# Patient Record
Sex: FEMALE | Race: WHITE | NOT HISPANIC OR LATINO | Employment: UNEMPLOYED | URBAN - NONMETROPOLITAN AREA
[De-identification: names, ages, dates, MRNs, and addresses within clinical notes are randomized per-mention and may not be internally consistent; named-entity substitution may affect disease eponyms.]

---

## 2017-07-06 ENCOUNTER — LAB (OUTPATIENT)
Dept: LAB | Facility: HOSPITAL | Age: 42
End: 2017-07-06

## 2017-07-06 ENCOUNTER — TRANSCRIBE ORDERS (OUTPATIENT)
Dept: LAB | Facility: HOSPITAL | Age: 42
End: 2017-07-06

## 2017-07-06 DIAGNOSIS — Z91.018 MULTIPLE FOOD ALLERGIES: Primary | ICD-10-CM

## 2017-07-06 DIAGNOSIS — Z91.018 MULTIPLE FOOD ALLERGIES: ICD-10-CM

## 2017-07-06 LAB — TSH SERPL DL<=0.05 MIU/L-ACNC: 0.62 MIU/ML (ref 0.46–4.68)

## 2017-07-06 PROCEDURE — 86003 ALLG SPEC IGE CRUDE XTRC EA: CPT

## 2017-07-06 PROCEDURE — 84443 ASSAY THYROID STIM HORMONE: CPT

## 2017-07-06 PROCEDURE — 36415 COLL VENOUS BLD VENIPUNCTURE: CPT

## 2017-07-11 LAB
BARLEY IGE QN: ABNORMAL KU/L
BEEF IGE QN: ABNORMAL KU/L
CABBAGE IGE QN: ABNORMAL KU/L
CARROT IGE QN: ABNORMAL KU/L
CHICKEN MEAT IGE QN: ABNORMAL KU/L
CODFISH IGE QN: <0.1 KU/L
CONV CLASS DESCRIPTION: ABNORMAL
CORN IGE QN: <0.1 KU/L
COW MILK IGE QN: 0.53 KU/L
CRAB IGE QN: ABNORMAL KU/L
EGG WHITE IGE QN: <0.1 KU/L
GRAPE IGE QN: ABNORMAL KU/L
GREEN PEPPER IGE QN: ABNORMAL KU/L
LETTUCE IGE QN: ABNORMAL KU/L
OAT IGE QN: ABNORMAL KU/L
ORANGE IGE QN: ABNORMAL KU/L
PEANUT IGE QN: <0.1 KU/L
PORK IGE QN: ABNORMAL KU/L
POTATO IGE QN: ABNORMAL KU/L
RICE IGE QN: ABNORMAL KU/L
RYE IGE: ABNORMAL KU/L
SHRIMP IGE: <0.1 KU/L
SOYBEAN IGE QN: <0.1 KU/L
TOMATO IGE QN: <0.1 KU/L
TUNA IGE QN: ABNORMAL KU/L
WHEAT IGE QN: <0.1 KU/L
WHITE BEAN IGE QN: ABNORMAL KU/L

## 2017-08-16 ENCOUNTER — HOSPITAL ENCOUNTER (EMERGENCY)
Facility: HOSPITAL | Age: 42
End: 2017-08-16

## 2017-12-26 ENCOUNTER — TRANSCRIBE ORDERS (OUTPATIENT)
Dept: ORTHOPEDIC SURGERY | Facility: CLINIC | Age: 42
End: 2017-12-26

## 2017-12-26 DIAGNOSIS — M25.562 BILATERAL CHRONIC KNEE PAIN: Primary | ICD-10-CM

## 2017-12-26 DIAGNOSIS — M25.561 BILATERAL CHRONIC KNEE PAIN: Primary | ICD-10-CM

## 2017-12-26 DIAGNOSIS — G89.29 BILATERAL CHRONIC KNEE PAIN: Primary | ICD-10-CM

## 2018-01-18 ENCOUNTER — OFFICE VISIT (OUTPATIENT)
Dept: ORTHOPEDIC SURGERY | Facility: CLINIC | Age: 43
End: 2018-01-18

## 2018-01-18 VITALS — WEIGHT: 253 LBS | HEIGHT: 66 IN | BODY MASS INDEX: 40.66 KG/M2

## 2018-01-18 DIAGNOSIS — M25.561 ACUTE PAIN OF BOTH KNEES: Primary | ICD-10-CM

## 2018-01-18 DIAGNOSIS — M25.562 ACUTE PAIN OF BOTH KNEES: Primary | ICD-10-CM

## 2018-01-18 DIAGNOSIS — M17.0 PRIMARY OSTEOARTHRITIS OF BOTH KNEES: ICD-10-CM

## 2018-01-18 DIAGNOSIS — M23.91 INTERNAL DERANGEMENT OF BOTH KNEES: ICD-10-CM

## 2018-01-18 DIAGNOSIS — E66.01 MORBID OBESITY WITH BMI OF 40.0-44.9, ADULT (HCC): ICD-10-CM

## 2018-01-18 DIAGNOSIS — M23.92 INTERNAL DERANGEMENT OF BOTH KNEES: ICD-10-CM

## 2018-01-18 PROCEDURE — 99203 OFFICE O/P NEW LOW 30 MIN: CPT | Performed by: ORTHOPAEDIC SURGERY

## 2018-01-18 RX ORDER — CETIRIZINE HYDROCHLORIDE 10 MG/1
TABLET ORAL
COMMUNITY
Start: 2017-12-29 | End: 2018-02-18

## 2018-01-18 RX ORDER — LOSARTAN POTASSIUM 50 MG/1
TABLET ORAL
COMMUNITY
Start: 2017-12-18 | End: 2020-02-04 | Stop reason: SDUPTHER

## 2018-01-18 RX ORDER — FAMOTIDINE 20 MG/1
TABLET, FILM COATED ORAL
COMMUNITY
Start: 2017-12-18 | End: 2018-02-18

## 2018-01-18 NOTE — PROGRESS NOTES
Michelle Lange is a 42 y.o. female   Primary provider:  Sandra Villalba MD       Chief Complaint   Patient presents with   • Left Knee - Consult   • Right Knee - Consult       HISTORY OF PRESENT ILLNESS:   Patient is here for consult- bilateral knee pain. Patient was referred to our office by Josy Rich. Patient denies injury. Patient states that [facility] obtained xray's and gave instruction to ice and elevate. Patient denies injury. Patient states that her bilateral knee has moderate to severe pain/discomfort, daily. Patient states that the pain is constant and is worse upon weightbearing. Patient states she has previous surgical HX of her RT knee. Patient has brought her xray disc for review.  She has had pain for 2-3 years.  Her pain is worse over the last 2-3 months.  No specific injury.  No improvement with Tylenol.  She cannot take anti-inflammatory medications because they call hives and an anaphylactic reaction.  She has occasional locking.  Pain with pivoting and twisting.  She has severe pain with squatting as well.      History of Present Illness     CONCURRENT MEDICAL HISTORY:    Past Medical History:   Diagnosis Date   • GERD (gastroesophageal reflux disease)        Allergies   Allergen Reactions   • Lactose Intolerance (Gi) Rash         Current Outpatient Prescriptions:   •  amoxicillin-clavulanate (AUGMENTIN) 875-125 MG per tablet, Take 1 tablet by mouth 2 (Two) Times a Day., Disp: 20 tablet, Rfl: 0  •  cetirizine (zyrTEC) 10 MG tablet, , Disp: , Rfl:   •  famotidine (PEPCID) 20 MG tablet, , Disp: , Rfl:   •  gabapentin (NEURONTIN) 300 MG capsule, Take 600 mg by mouth 3 (Three) Times a Day., Disp: , Rfl:   •  losartan (COZAAR) 50 MG tablet, , Disp: , Rfl:   •  tiZANidine (ZANAFLEX) 4 MG tablet, Take 4 mg by mouth Every 8 (Eight) Hours As Needed for Muscle Spasms., Disp: , Rfl:     History reviewed. No pertinent surgical history.    History reviewed. No pertinent family  "history.    Social History     Social History   • Marital status:      Spouse name: N/A   • Number of children: N/A   • Years of education: N/A     Occupational History   • Not on file.     Social History Main Topics   • Smoking status: Never Smoker   • Smokeless tobacco: Never Used   • Alcohol use No   • Drug use: No   • Sexual activity: Defer     Other Topics Concern   • Not on file     Social History Narrative        Review of Systems   Constitutional: Positive for activity change.   HENT: Negative.    Eyes: Negative.    Respiratory: Negative.    Cardiovascular: Negative.    Gastrointestinal: Negative.    Endocrine: Negative.    Genitourinary: Negative.    Musculoskeletal: Positive for joint swelling.   Skin: Negative.    Allergic/Immunologic: Negative.    Neurological: Negative.    Hematological: Negative.    Psychiatric/Behavioral: Negative.        PHYSICAL EXAMINATION:       Ht 167.6 cm (66\")  Wt 115 kg (253 lb)  BMI 40.84 kg/m2    Physical Exam   Constitutional: She is oriented to person, place, and time. She appears well-developed and well-nourished.   Musculoskeletal:        Right knee: She exhibits no effusion.        Left knee: She exhibits no effusion.   Neurological: She is alert and oriented to person, place, and time.   Psychiatric: She has a normal mood and affect. Her behavior is normal. Judgment and thought content normal.       GAIT:     [x]  Normal  []  Antalgic    Assistive device: [x]  None  []  Walker     []  Crutches  []  Cane     []  Wheelchair  []  Stretcher    Right Knee Exam     Tenderness   The patient is experiencing tenderness in the medial joint line, lateral joint line, lateral retinaculum and medial retinaculum.    Range of Motion   Extension: 0   Flexion: 120     Muscle Strength     The patient has normal right knee strength.    Tests   Ramesh:  Medial - positive   Lachman:  Anterior - negative      Drawer:       Anterior - negative      Varus: negative  Valgus: " negative    Other   Erythema: absent  Sensation: normal  Pulse: present  Swelling: none  Other tests: no effusion present      Left Knee Exam     Tenderness   The patient is experiencing tenderness in the medial joint line.    Range of Motion   Extension: 0   Flexion: 120     Muscle Strength     The patient has normal left knee strength.    Tests   Ramesh:  Medial - positive   Lachman:  Anterior - negative      Drawer:       Anterior - negative       Varus: negative  Valgus: negative    Other   Erythema: absent  Sensation: normal  Pulse: present  Swelling: none  Effusion: no effusion present                xrays reviewed from outside facility of both knees which show acceptable position and alignment without acute abnormality.  No significant OA changes noted.          ASSESSMENT:    Diagnoses and all orders for this visit:    Acute pain of both knees  -     MRI Knee Left Without Contrast; Future  -     MRI Knee Right Without Contrast; Future    Primary osteoarthritis of both knees    Morbid obesity with BMI of 40.0-44.9, adult  -     MRI Knee Left Without Contrast; Future  -     MRI Knee Right Without Contrast; Future    Internal derangement of both knees  -     MRI Knee Left Without Contrast; Future  -     MRI Knee Right Without Contrast; Future    Other orders  -     cetirizine (zyrTEC) 10 MG tablet;   -     famotidine (PEPCID) 20 MG tablet;   -     losartan (COZAAR) 50 MG tablet;           PLAN    She has been having pain that is worsening over the last 2-3 years.  She has pain with pivoting and twisting and pain with deep knee bends.  She has medial joint line tenderness in both knees.  Plan is to proceed with MRI of both knees to assess for internal derangement.  Follow-up after the MRIs to discuss further treatment options.    Return for recheck for MRI results.    Arsalan Welch MD

## 2018-01-25 ENCOUNTER — HOSPITAL ENCOUNTER (OUTPATIENT)
Dept: MRI IMAGING | Facility: HOSPITAL | Age: 43
Discharge: HOME | End: 2018-01-25
Admitting: ORTHOPAEDIC SURGERY

## 2018-01-25 ENCOUNTER — HOSPITAL ENCOUNTER (OUTPATIENT)
Dept: MRI IMAGING | Facility: HOSPITAL | Age: 43
Discharge: HOME | End: 2018-01-25

## 2018-01-25 DIAGNOSIS — E66.01 MORBID OBESITY WITH BMI OF 40.0-44.9, ADULT (HCC): ICD-10-CM

## 2018-01-25 DIAGNOSIS — M25.561 ACUTE PAIN OF BOTH KNEES: ICD-10-CM

## 2018-01-25 DIAGNOSIS — M23.91 INTERNAL DERANGEMENT OF BOTH KNEES: ICD-10-CM

## 2018-01-25 DIAGNOSIS — M25.562 ACUTE PAIN OF BOTH KNEES: ICD-10-CM

## 2018-01-25 DIAGNOSIS — M23.92 INTERNAL DERANGEMENT OF BOTH KNEES: ICD-10-CM

## 2018-01-25 PROCEDURE — 73721 MRI JNT OF LWR EXTRE W/O DYE: CPT

## 2018-02-15 ENCOUNTER — OFFICE VISIT (OUTPATIENT)
Dept: ORTHOPEDIC SURGERY | Facility: CLINIC | Age: 43
End: 2018-02-15

## 2018-02-15 VITALS — WEIGHT: 256 LBS | HEIGHT: 66 IN | BODY MASS INDEX: 41.14 KG/M2

## 2018-02-15 DIAGNOSIS — M17.0 PRIMARY OSTEOARTHRITIS OF BOTH KNEES: Primary | ICD-10-CM

## 2018-02-15 DIAGNOSIS — E66.01 MORBID OBESITY WITH BMI OF 40.0-44.9, ADULT (HCC): ICD-10-CM

## 2018-02-15 DIAGNOSIS — M22.41 CHONDROMALACIA OF BOTH PATELLAE: ICD-10-CM

## 2018-02-15 DIAGNOSIS — M25.562 ACUTE PAIN OF BOTH KNEES: ICD-10-CM

## 2018-02-15 DIAGNOSIS — M25.561 ACUTE PAIN OF BOTH KNEES: ICD-10-CM

## 2018-02-15 DIAGNOSIS — M22.42 CHONDROMALACIA OF BOTH PATELLAE: ICD-10-CM

## 2018-02-15 PROCEDURE — 99213 OFFICE O/P EST LOW 20 MIN: CPT | Performed by: ORTHOPAEDIC SURGERY

## 2018-02-15 NOTE — PROGRESS NOTES
"Michelle Lange is a 43 y.o. female returns for     Chief Complaint   Patient presents with   • Left Knee - Follow-up   • Right Knee - Follow-up       HISTORY OF PRESENT ILLNESS: Patient is here to discuss MRI- bilateral knee. Patient denies any changes since her last office visit.   She reports pain with activity and pain with prolonged standing or walking.       CONCURRENT MEDICAL HISTORY:    Past Medical History:   Diagnosis Date   • GERD (gastroesophageal reflux disease)        Allergies   Allergen Reactions   • Ibuprofen Anaphylaxis   • Lactose Intolerance (Gi) Rash         Current Outpatient Prescriptions:   •  amoxicillin-clavulanate (AUGMENTIN) 875-125 MG per tablet, Take 1 tablet by mouth 2 (Two) Times a Day., Disp: 20 tablet, Rfl: 0  •  cetirizine (zyrTEC) 10 MG tablet, , Disp: , Rfl:   •  famotidine (PEPCID) 20 MG tablet, , Disp: , Rfl:   •  gabapentin (NEURONTIN) 300 MG capsule, Take 600 mg by mouth 3 (Three) Times a Day., Disp: , Rfl:   •  losartan (COZAAR) 50 MG tablet, , Disp: , Rfl:   •  tiZANidine (ZANAFLEX) 4 MG tablet, Take 4 mg by mouth Every 8 (Eight) Hours As Needed for Muscle Spasms., Disp: , Rfl:     History reviewed. No pertinent surgical history.    ROS  No fevers or chills.  No chest pain or shortness of air.  No GI or  disturbances.    PHYSICAL EXAMINATION:       Ht 167.6 cm (66\")  Wt 116 kg (256 lb)  BMI 41.32 kg/m2    Physical Exam   Constitutional: She is oriented to person, place, and time. She appears well-developed and well-nourished.   Musculoskeletal:        Right knee: She exhibits no effusion.        Left knee: She exhibits no effusion.   Neurological: She is alert and oriented to person, place, and time.   Psychiatric: She has a normal mood and affect. Her behavior is normal. Judgment and thought content normal.       GAIT:     [x]  Normal  []  Antalgic    Assistive device: [x]  None  []  Walker     []  Crutches  []  Cane     []  Wheelchair  []  Stretcher    Right " Knee Exam     Tenderness   The patient is experiencing tenderness in the medial joint line, lateral joint line, lateral retinaculum and medial retinaculum.    Range of Motion   Extension: 0   Flexion: 120     Muscle Strength     The patient has normal right knee strength.    Tests   Ramesh:  Medial - positive   Lachman:  Anterior - negative      Drawer:       Anterior - negative      Varus: negative  Valgus: negative    Other   Erythema: absent  Sensation: normal  Pulse: present  Swelling: none  Other tests: no effusion present      Left Knee Exam     Tenderness   The patient is experiencing tenderness in the medial joint line.    Range of Motion   Extension: 0   Flexion: 120     Muscle Strength     The patient has normal left knee strength.    Tests   Ramesh:  Medial - positive   Lachman:  Anterior - negative      Drawer:       Anterior - negative       Varus: negative  Valgus: negative    Other   Erythema: absent  Sensation: normal  Pulse: present  Swelling: none  Effusion: no effusion present              Mri Knee Right Without Contrast    Result Date: 1/26/2018  Narrative: [facility] [place] Patient Name: DANIELLE BECKMAN ORDERING: CAYLA GLEASON ATTENDING: REFERRING: CAYLA GLEASON ----------------------- PROCEDURE: MR right knee without contrast CLINICAL HISTORY: Knee pain, internal derangement COMPARISON: None TECHNIQUE:  Multiplanar and multispin echo MRI sequences of the right knee were performed without the use of IV or intra-articular contrast. FINDINGS: Menisci:  The medial and lateral menisci are intact. Osseous structures and cartilage:  There is a 4 mm focal full-thickness callus defect along the weightbearing surface of the medial femoral condyle. The articular cartilage overlying the medial tibial plateau is mildly heterogeneous, but intact. There is partial thickness cartilage loss along the weightbearing surface of the lateral femoral condyle. The articular cartilage overlying the  lateral tibial plateau is intact. There are areas of partial-thickness cartilage loss along the patellar apex and medial patellar facet. The cartilage overlying the femoral trochlea is intact. Bone marrow signal intensity is within normal limits. No fracture or osteochondral defect. Ligaments:  The cruciate and collateral ligaments are intact. Extensor mechanism:  The quadriceps and patellar tendons are intact. Additional findings:  No joint effusion. The soft tissues of the knee are within normal limits.     Impression: Grade 3-4 tricompartmental chondromalacia. Electronically signed by:  Brina Hoyt MD  1/26/2018 8:27 AM CST Workstation: [id number]    Mri Knee Left Without Contrast    Result Date: 1/25/2018  Narrative: MRI left knee. HISTORY: Left knee pain. Morbid obesity. TECHNIQUE: Multiplanar multisequence noncontrast images left knee. FINDINGS: Normal anterior and posterior cruciate ligaments. Normal quadriceps and patellar tendons. Normal medial and lateral menisci. Areas of grade 2 and grade III chondromalacia patellar articular hyaline cartilage lateral articular facet. Multiseptated posterior cystic structure at the midline probably intrasynovial ganglion. This is most apparent on series 2 image 17, also series 4 image 16. This measures 2.07 cm in craniocaudal diameter. Normal medial collateral ligament, and lateral collateral ligament complex. MRI left knee is otherwise unremarkable.     Impression: CONCLUSION: Posterior multiseptated cystic lesion left knee probably intrasynovial ganglion. Degenerative changes patellofemoral joint. Areas of grade 2 and grade III chondromalacia patellar articular hyaline cartilage, lateral articular facet. MRI left knee without contrast is otherwise unremarkable. Electronically signed by:  Sami Parrish MD  1/25/2018 3:46 PM CST Workstation: [id number]            ASSESSMENT:    Diagnoses and all orders for this visit:    Primary osteoarthritis of both knees    Acute  pain of both knees    Chondromalacia of both patellae    Morbid obesity with BMI of 40.0-44.9, adult          PLAN    Patient's BMI is above normal parameters. Follow-up plan includes:  exercise counseling and nutrition counseling.    We discussed that there was no definitive surgical lesion on her MRIs.  I strongly encouraged her to continue with weight loss and healthy lifestyle options.  We discussed the possibility of anti-inflammatory medication and glucosamine/chondroitin.  She does have the Dairy and red meat allergy and therefore has a very difficult time with certain medications.  She will do the research and assess whether or not she can take the medications.  Activity as tolerated and follow-up as needed    Return if symptoms worsen or fail to improve, for recheck.    Arsalan Welch MD

## 2018-02-19 ENCOUNTER — TRANSCRIBE ORDERS (OUTPATIENT)
Dept: LAB | Facility: HOSPITAL | Age: 43
End: 2018-02-19

## 2018-02-19 ENCOUNTER — LAB (OUTPATIENT)
Dept: LAB | Facility: HOSPITAL | Age: 43
End: 2018-02-19

## 2018-02-19 DIAGNOSIS — Z13.29 SCREENING FOR ENDOCRINE DISORDER: ICD-10-CM

## 2018-02-19 DIAGNOSIS — Z13.29 SCREENING FOR ENDOCRINE DISORDER: Primary | ICD-10-CM

## 2018-02-19 DIAGNOSIS — Z13.220 SCREENING FOR LIPOID DISORDERS: ICD-10-CM

## 2018-02-19 LAB
ALBUMIN SERPL-MCNC: 3.8 G/DL (ref 3.4–4.8)
ALBUMIN/GLOB SERPL: 0.9 G/DL (ref 1.1–1.8)
ALP SERPL-CCNC: 89 U/L (ref 38–126)
ALT SERPL W P-5'-P-CCNC: 28 U/L (ref 9–52)
ANION GAP SERPL CALCULATED.3IONS-SCNC: 11 MMOL/L (ref 5–15)
ARTICHOKE IGE QN: 100 MG/DL (ref 1–129)
AST SERPL-CCNC: 23 U/L (ref 14–36)
BACTERIA UR QL AUTO: ABNORMAL /HPF
BASOPHILS # BLD AUTO: 0.03 10*3/MM3 (ref 0–0.2)
BASOPHILS NFR BLD AUTO: 0.4 % (ref 0–2)
BILIRUB SERPL-MCNC: 0.2 MG/DL (ref 0.2–1.3)
BILIRUB UR QL STRIP: NEGATIVE
BUN BLD-MCNC: 14 MG/DL (ref 7–21)
BUN/CREAT SERPL: 15.6 (ref 7–25)
CALCIUM SPEC-SCNC: 9.6 MG/DL (ref 8.4–10.2)
CHLORIDE SERPL-SCNC: 104 MMOL/L (ref 95–110)
CHOLEST SERPL-MCNC: 175 MG/DL (ref 0–199)
CLARITY UR: ABNORMAL
CO2 SERPL-SCNC: 29 MMOL/L (ref 22–31)
COLOR UR: YELLOW
CREAT BLD-MCNC: 0.9 MG/DL (ref 0.5–1)
DEPRECATED RDW RBC AUTO: 43.1 FL (ref 36.4–46.3)
EOSINOPHIL # BLD AUTO: 0.22 10*3/MM3 (ref 0–0.7)
EOSINOPHIL NFR BLD AUTO: 3 % (ref 0–7)
ERYTHROCYTE [DISTWIDTH] IN BLOOD BY AUTOMATED COUNT: 14.1 % (ref 11.5–14.5)
GFR SERPL CREATININE-BSD FRML MDRD: 69 ML/MIN/1.73 (ref 58–135)
GLOBULIN UR ELPH-MCNC: 4.2 GM/DL (ref 2.3–3.5)
GLUCOSE BLD-MCNC: 89 MG/DL (ref 60–100)
GLUCOSE UR STRIP-MCNC: NEGATIVE MG/DL
HCT VFR BLD AUTO: 37 % (ref 35–45)
HDLC SERPL-MCNC: 47 MG/DL (ref 60–200)
HGB BLD-MCNC: 12 G/DL (ref 12–15.5)
HGB UR QL STRIP.AUTO: NEGATIVE
HYALINE CASTS UR QL AUTO: ABNORMAL /LPF
IMM GRANULOCYTES # BLD: 0.02 10*3/MM3 (ref 0–0.02)
IMM GRANULOCYTES NFR BLD: 0.3 % (ref 0–0.5)
KETONES UR QL STRIP: NEGATIVE
LDLC/HDLC SERPL: 2.35 {RATIO} (ref 0–3.22)
LEUKOCYTE ESTERASE UR QL STRIP.AUTO: NEGATIVE
LYMPHOCYTES # BLD AUTO: 2.99 10*3/MM3 (ref 0.6–4.2)
LYMPHOCYTES NFR BLD AUTO: 40.5 % (ref 10–50)
MCH RBC QN AUTO: 27.1 PG (ref 26.5–34)
MCHC RBC AUTO-ENTMCNC: 32.4 G/DL (ref 31.4–36)
MCV RBC AUTO: 83.5 FL (ref 80–98)
MONOCYTES # BLD AUTO: 0.51 10*3/MM3 (ref 0–0.9)
MONOCYTES NFR BLD AUTO: 6.9 % (ref 0–12)
MUCOUS THREADS URNS QL MICRO: ABNORMAL /HPF
NEUTROPHILS # BLD AUTO: 3.62 10*3/MM3 (ref 2–8.6)
NEUTROPHILS NFR BLD AUTO: 48.9 % (ref 37–80)
NITRITE UR QL STRIP: NEGATIVE
PH UR STRIP.AUTO: 6 [PH] (ref 5–9)
PLATELET # BLD AUTO: 417 10*3/MM3 (ref 150–450)
PMV BLD AUTO: 9.8 FL (ref 8–12)
POTASSIUM BLD-SCNC: 4.2 MMOL/L (ref 3.5–5.1)
PROT SERPL-MCNC: 8 G/DL (ref 6.3–8.6)
PROT UR QL STRIP: NEGATIVE
RBC # BLD AUTO: 4.43 10*6/MM3 (ref 3.77–5.16)
RBC # UR: ABNORMAL /HPF
REF LAB TEST METHOD: ABNORMAL
SODIUM BLD-SCNC: 144 MMOL/L (ref 137–145)
SP GR UR STRIP: 1.02 (ref 1–1.03)
SQUAMOUS #/AREA URNS HPF: ABNORMAL /HPF
TRIGL SERPL-MCNC: 87 MG/DL (ref 20–199)
UROBILINOGEN UR QL STRIP: ABNORMAL
WBC NRBC COR # BLD: 7.39 10*3/MM3 (ref 3.2–9.8)
WBC UR QL AUTO: ABNORMAL /HPF

## 2018-02-19 PROCEDURE — 85025 COMPLETE CBC W/AUTO DIFF WBC: CPT

## 2018-02-19 PROCEDURE — 81001 URINALYSIS AUTO W/SCOPE: CPT

## 2018-02-19 PROCEDURE — 80061 LIPID PANEL: CPT

## 2018-02-19 PROCEDURE — 80053 COMPREHEN METABOLIC PANEL: CPT

## 2018-02-19 PROCEDURE — 36415 COLL VENOUS BLD VENIPUNCTURE: CPT

## 2018-05-15 ENCOUNTER — LAB (OUTPATIENT)
Dept: LAB | Facility: HOSPITAL | Age: 43
End: 2018-05-15

## 2018-05-15 DIAGNOSIS — N93.8 DUB (DYSFUNCTIONAL UTERINE BLEEDING): ICD-10-CM

## 2018-05-15 DIAGNOSIS — N93.8 DUB (DYSFUNCTIONAL UTERINE BLEEDING): Primary | ICD-10-CM

## 2018-05-15 LAB — B-HCG UR QL: NEGATIVE

## 2018-05-15 PROCEDURE — 81025 URINE PREGNANCY TEST: CPT

## 2018-05-25 ENCOUNTER — LAB (OUTPATIENT)
Dept: LAB | Facility: HOSPITAL | Age: 43
End: 2018-05-25

## 2018-05-25 DIAGNOSIS — N93.8 DUB (DYSFUNCTIONAL UTERINE BLEEDING): ICD-10-CM

## 2018-05-25 DIAGNOSIS — N93.8 DUB (DYSFUNCTIONAL UTERINE BLEEDING): Primary | ICD-10-CM

## 2018-05-25 LAB — HCG INTACT+B SERPL-ACNC: 5.14 MIU/ML

## 2018-05-25 PROCEDURE — 36415 COLL VENOUS BLD VENIPUNCTURE: CPT

## 2018-05-25 PROCEDURE — 84702 CHORIONIC GONADOTROPIN TEST: CPT

## 2018-06-01 ENCOUNTER — LAB (OUTPATIENT)
Dept: LAB | Facility: HOSPITAL | Age: 43
End: 2018-06-01

## 2018-06-01 ENCOUNTER — TRANSCRIBE ORDERS (OUTPATIENT)
Dept: LAB | Facility: HOSPITAL | Age: 43
End: 2018-06-01

## 2018-06-01 DIAGNOSIS — Z32.00 POSSIBLE PREGNANCY, NOT CONFIRMED: Primary | ICD-10-CM

## 2018-06-01 DIAGNOSIS — Z32.00 POSSIBLE PREGNANCY, NOT CONFIRMED: ICD-10-CM

## 2018-06-01 LAB — HCG INTACT+B SERPL-ACNC: 5.41 MIU/ML

## 2018-06-01 PROCEDURE — 36415 COLL VENOUS BLD VENIPUNCTURE: CPT

## 2018-06-01 PROCEDURE — 84702 CHORIONIC GONADOTROPIN TEST: CPT

## 2018-06-29 ENCOUNTER — APPOINTMENT (OUTPATIENT)
Dept: GENERAL RADIOLOGY | Facility: HOSPITAL | Age: 43
End: 2018-06-29

## 2018-06-29 ENCOUNTER — HOSPITAL ENCOUNTER (EMERGENCY)
Facility: HOSPITAL | Age: 43
Discharge: HOME | End: 2018-06-29
Attending: EMERGENCY MEDICINE | Admitting: EMERGENCY MEDICINE

## 2018-06-29 VITALS
HEIGHT: 66 IN | RESPIRATION RATE: 18 BRPM | DIASTOLIC BLOOD PRESSURE: 79 MMHG | BODY MASS INDEX: 41.95 KG/M2 | TEMPERATURE: 97.9 F | HEART RATE: 59 BPM | WEIGHT: 261 LBS | SYSTOLIC BLOOD PRESSURE: 166 MMHG | OXYGEN SATURATION: 99 %

## 2018-06-29 DIAGNOSIS — M79.18 MUSCULOSKELETAL PAIN: Primary | ICD-10-CM

## 2018-06-29 LAB
ALBUMIN SERPL-MCNC: 4.2 G/DL (ref 3.4–4.8)
ALBUMIN/GLOB SERPL: 1 G/DL (ref 1.1–1.8)
ALP SERPL-CCNC: 96 U/L (ref 38–126)
ALT SERPL W P-5'-P-CCNC: 20 U/L (ref 9–52)
ANION GAP SERPL CALCULATED.3IONS-SCNC: 10 MMOL/L (ref 5–15)
AST SERPL-CCNC: 23 U/L (ref 14–36)
BASOPHILS # BLD AUTO: 0.05 10*3/MM3 (ref 0–0.2)
BASOPHILS NFR BLD AUTO: 0.5 % (ref 0–2)
BILIRUB SERPL-MCNC: 0.3 MG/DL (ref 0.2–1.3)
BILIRUB UR QL STRIP: NEGATIVE
BUN BLD-MCNC: 18 MG/DL (ref 7–21)
BUN/CREAT SERPL: 19.6 (ref 7–25)
CALCIUM SPEC-SCNC: 9.3 MG/DL (ref 8.4–10.2)
CHLORIDE SERPL-SCNC: 105 MMOL/L (ref 95–110)
CLARITY UR: CLEAR
CO2 SERPL-SCNC: 31 MMOL/L (ref 22–31)
COLOR UR: YELLOW
CREAT BLD-MCNC: 0.92 MG/DL (ref 0.5–1)
CRP SERPL-MCNC: 0.9 MG/DL (ref 0–1)
DEPRECATED RDW RBC AUTO: 43.1 FL (ref 36.4–46.3)
EOSINOPHIL # BLD AUTO: 0.22 10*3/MM3 (ref 0–0.7)
EOSINOPHIL NFR BLD AUTO: 2.3 % (ref 0–7)
ERYTHROCYTE [DISTWIDTH] IN BLOOD BY AUTOMATED COUNT: 14 % (ref 11.5–14.5)
GFR SERPL CREATININE-BSD FRML MDRD: 67 ML/MIN/1.73 (ref 58–135)
GLOBULIN UR ELPH-MCNC: 4.1 GM/DL (ref 2.3–3.5)
GLUCOSE BLD-MCNC: 95 MG/DL (ref 60–100)
GLUCOSE UR STRIP-MCNC: NEGATIVE MG/DL
HCT VFR BLD AUTO: 37.8 % (ref 35–45)
HGB BLD-MCNC: 12.7 G/DL (ref 12–15.5)
HGB UR QL STRIP.AUTO: NEGATIVE
HOLD SPECIMEN: NORMAL
IMM GRANULOCYTES # BLD: 0.02 10*3/MM3 (ref 0–0.02)
IMM GRANULOCYTES NFR BLD: 0.2 % (ref 0–0.5)
KETONES UR QL STRIP: NEGATIVE
LEUKOCYTE ESTERASE UR QL STRIP.AUTO: NEGATIVE
LIPASE SERPL-CCNC: 68 U/L (ref 23–300)
LYMPHOCYTES # BLD AUTO: 4.13 10*3/MM3 (ref 0.6–4.2)
LYMPHOCYTES NFR BLD AUTO: 42.5 % (ref 10–50)
MCH RBC QN AUTO: 28.2 PG (ref 26.5–34)
MCHC RBC AUTO-ENTMCNC: 33.6 G/DL (ref 31.4–36)
MCV RBC AUTO: 84 FL (ref 80–98)
MONOCYTES # BLD AUTO: 0.71 10*3/MM3 (ref 0–0.9)
MONOCYTES NFR BLD AUTO: 7.3 % (ref 0–12)
NEUTROPHILS # BLD AUTO: 4.58 10*3/MM3 (ref 2–8.6)
NEUTROPHILS NFR BLD AUTO: 47.2 % (ref 37–80)
NITRITE UR QL STRIP: NEGATIVE
PH UR STRIP.AUTO: 5.5 [PH] (ref 5–9)
PLATELET # BLD AUTO: 423 10*3/MM3 (ref 150–450)
PMV BLD AUTO: 9.5 FL (ref 8–12)
POTASSIUM BLD-SCNC: 4 MMOL/L (ref 3.5–5.1)
PROT SERPL-MCNC: 8.3 G/DL (ref 6.3–8.6)
PROT UR QL STRIP: NEGATIVE
RBC # BLD AUTO: 4.5 10*6/MM3 (ref 3.77–5.16)
SODIUM BLD-SCNC: 146 MMOL/L (ref 137–145)
SP GR UR STRIP: 1.02 (ref 1–1.03)
UROBILINOGEN UR QL STRIP: NORMAL
WBC NRBC COR # BLD: 9.71 10*3/MM3 (ref 3.2–9.8)
WHOLE BLOOD HOLD SPECIMEN: NORMAL

## 2018-06-29 PROCEDURE — 74019 RADEX ABDOMEN 2 VIEWS: CPT

## 2018-06-29 PROCEDURE — 99283 EMERGENCY DEPT VISIT LOW MDM: CPT

## 2018-06-29 PROCEDURE — 81003 URINALYSIS AUTO W/O SCOPE: CPT | Performed by: EMERGENCY MEDICINE

## 2018-06-29 PROCEDURE — 86140 C-REACTIVE PROTEIN: CPT | Performed by: EMERGENCY MEDICINE

## 2018-06-29 PROCEDURE — 85025 COMPLETE CBC W/AUTO DIFF WBC: CPT | Performed by: EMERGENCY MEDICINE

## 2018-06-29 PROCEDURE — 83690 ASSAY OF LIPASE: CPT | Performed by: EMERGENCY MEDICINE

## 2018-06-29 PROCEDURE — 80053 COMPREHEN METABOLIC PANEL: CPT | Performed by: EMERGENCY MEDICINE

## 2018-06-29 RX ORDER — SODIUM CHLORIDE 0.9 % (FLUSH) 0.9 %
10 SYRINGE (ML) INJECTION AS NEEDED
Status: DISCONTINUED | OUTPATIENT
Start: 2018-06-29 | End: 2018-06-29 | Stop reason: HOSPADM

## 2018-09-20 ENCOUNTER — TELEPHONE (OUTPATIENT)
Dept: GENERAL RADIOLOGY | Facility: HOSPITAL | Age: 43
End: 2018-09-20

## 2018-09-20 NOTE — TELEPHONE ENCOUNTER
PT: Michelle Lange : 1975, did not show up for her CT Abdomen Pelvis With Contrast on 2018 at 11:30am. I called left a message with Sherlyn at [phone].

## 2018-10-09 ENCOUNTER — HOSPITAL ENCOUNTER (OUTPATIENT)
Dept: CT IMAGING | Facility: HOSPITAL | Age: 43
Discharge: HOME | End: 2018-10-09
Admitting: NURSE PRACTITIONER

## 2018-10-09 ENCOUNTER — APPOINTMENT (OUTPATIENT)
Dept: LAB | Facility: HOSPITAL | Age: 43
End: 2018-10-09

## 2018-10-09 DIAGNOSIS — R10.9 ABDOMINAL PAIN, UNSPECIFIED ABDOMINAL LOCATION: ICD-10-CM

## 2018-10-09 PROBLEM — K59.00 CONSTIPATION: Status: ACTIVE | Noted: 2018-10-09

## 2018-10-09 PROCEDURE — 25010000002 IOPAMIDOL 61 % SOLUTION: Performed by: NURSE PRACTITIONER

## 2018-10-09 PROCEDURE — 83690 ASSAY OF LIPASE: CPT | Performed by: NURSE PRACTITIONER

## 2018-10-09 PROCEDURE — 74177 CT ABD & PELVIS W/CONTRAST: CPT

## 2018-10-09 PROCEDURE — 80053 COMPREHEN METABOLIC PANEL: CPT | Performed by: NURSE PRACTITIONER

## 2018-10-09 PROCEDURE — 86140 C-REACTIVE PROTEIN: CPT | Performed by: NURSE PRACTITIONER

## 2018-10-09 PROCEDURE — 85025 COMPLETE CBC W/AUTO DIFF WBC: CPT | Performed by: NURSE PRACTITIONER

## 2018-10-09 RX ADMIN — IOPAMIDOL 94 ML: 612 INJECTION, SOLUTION INTRAVENOUS at 10:29

## 2019-05-30 ENCOUNTER — TRANSCRIBE ORDERS (OUTPATIENT)
Dept: ORTHOPEDIC SURGERY | Facility: CLINIC | Age: 44
End: 2019-05-30

## 2019-05-30 DIAGNOSIS — M79.632 LEFT FOREARM PAIN: Primary | ICD-10-CM

## 2019-07-25 ENCOUNTER — TRANSCRIBE ORDERS (OUTPATIENT)
Dept: URGENT CARE | Facility: CLINIC | Age: 44
End: 2019-07-25

## 2019-07-25 ENCOUNTER — LAB (OUTPATIENT)
Dept: LAB | Facility: HOSPITAL | Age: 44
End: 2019-07-25

## 2019-07-25 DIAGNOSIS — A02.9: Primary | ICD-10-CM

## 2019-07-25 DIAGNOSIS — R19.7 DIARRHEA OF PRESUMED INFECTIOUS ORIGIN: ICD-10-CM

## 2019-07-25 LAB — HAV IGM SERPL QL IA: NORMAL

## 2019-07-25 PROCEDURE — 0097U HC BIOFIRE FILMARRAY GI PANEL: CPT

## 2019-07-25 PROCEDURE — 80053 COMPREHEN METABOLIC PANEL: CPT | Performed by: NURSE PRACTITIONER

## 2019-07-25 PROCEDURE — 85025 COMPLETE CBC W/AUTO DIFF WBC: CPT | Performed by: NURSE PRACTITIONER

## 2019-07-25 PROCEDURE — 86709 HEPATITIS A IGM ANTIBODY: CPT

## 2019-07-25 RX ORDER — CIPROFLOXACIN 500 MG/1
500 TABLET, FILM COATED ORAL 2 TIMES DAILY
Qty: 10 TABLET | Refills: 0 | Status: SHIPPED | OUTPATIENT
Start: 2019-07-25 | End: 2019-07-30

## 2019-07-26 ENCOUNTER — HOSPITAL ENCOUNTER (EMERGENCY)
Facility: HOSPITAL | Age: 44
Discharge: HOME | End: 2019-07-27
Attending: FAMILY MEDICINE | Admitting: FAMILY MEDICINE

## 2019-07-26 DIAGNOSIS — A09 DIARRHEA OF INFECTIOUS ORIGIN: Primary | ICD-10-CM

## 2019-07-26 LAB
ALBUMIN SERPL-MCNC: 3.8 G/DL (ref 3.5–5.2)
ALBUMIN/GLOB SERPL: 0.8 G/DL
ALP SERPL-CCNC: 112 U/L (ref 39–117)
ALT SERPL W P-5'-P-CCNC: 18 U/L (ref 1–33)
ANION GAP SERPL CALCULATED.3IONS-SCNC: 20 MMOL/L (ref 5–15)
AST SERPL-CCNC: 21 U/L (ref 1–32)
BASOPHILS # BLD AUTO: 0.06 10*3/MM3 (ref 0–0.2)
BASOPHILS NFR BLD AUTO: 0.5 % (ref 0–1.5)
BILIRUB SERPL-MCNC: 0.3 MG/DL (ref 0.2–1.2)
BUN BLD-MCNC: 30 MG/DL (ref 6–20)
BUN/CREAT SERPL: 7.2 (ref 7–25)
CALCIUM SPEC-SCNC: 9.6 MG/DL (ref 8.6–10.5)
CHLORIDE SERPL-SCNC: 99 MMOL/L (ref 98–107)
CO2 SERPL-SCNC: 22 MMOL/L (ref 22–29)
CREAT BLD-MCNC: 4.15 MG/DL (ref 0.57–1)
DEPRECATED RDW RBC AUTO: 42.9 FL (ref 37–54)
EOSINOPHIL # BLD AUTO: 0.06 10*3/MM3 (ref 0–0.4)
EOSINOPHIL NFR BLD AUTO: 0.5 % (ref 0.3–6.2)
ERYTHROCYTE [DISTWIDTH] IN BLOOD BY AUTOMATED COUNT: 14.7 % (ref 12.3–15.4)
GFR SERPL CREATININE-BSD FRML MDRD: 12 ML/MIN/1.73
GFR SERPL CREATININE-BSD FRML MDRD: ABNORMAL ML/MIN/1.73
GLOBULIN UR ELPH-MCNC: 4.7 GM/DL
GLUCOSE BLD-MCNC: 109 MG/DL (ref 65–99)
HCT VFR BLD AUTO: 39.1 % (ref 34–46.6)
HGB BLD-MCNC: 12.8 G/DL (ref 12–15.9)
HOLD SPECIMEN: NORMAL
HOLD SPECIMEN: NORMAL
IMM GRANULOCYTES # BLD AUTO: 0.1 10*3/MM3 (ref 0–0.05)
IMM GRANULOCYTES NFR BLD AUTO: 0.9 % (ref 0–0.5)
LIPASE SERPL-CCNC: 54 U/L (ref 13–60)
LYMPHOCYTES # BLD AUTO: 2.64 10*3/MM3 (ref 0.7–3.1)
LYMPHOCYTES NFR BLD AUTO: 24 % (ref 19.6–45.3)
MCH RBC QN AUTO: 26.3 PG (ref 26.6–33)
MCHC RBC AUTO-ENTMCNC: 32.7 G/DL (ref 31.5–35.7)
MCV RBC AUTO: 80.3 FL (ref 79–97)
MONOCYTES # BLD AUTO: 1.47 10*3/MM3 (ref 0.1–0.9)
MONOCYTES NFR BLD AUTO: 13.4 % (ref 5–12)
NEUTROPHILS # BLD AUTO: 6.66 10*3/MM3 (ref 1.7–7)
NEUTROPHILS NFR BLD AUTO: 60.7 % (ref 42.7–76)
NRBC BLD AUTO-RTO: 0 /100 WBC (ref 0–0.2)
PLATELET # BLD AUTO: 441 10*3/MM3 (ref 140–450)
PMV BLD AUTO: 10.3 FL (ref 6–12)
POTASSIUM BLD-SCNC: 3.5 MMOL/L (ref 3.5–5.2)
PROT SERPL-MCNC: 8.5 G/DL (ref 6–8.5)
RBC # BLD AUTO: 4.87 10*6/MM3 (ref 3.77–5.28)
SODIUM BLD-SCNC: 141 MMOL/L (ref 136–145)
WBC NRBC COR # BLD: 10.99 10*3/MM3 (ref 3.4–10.8)
WHOLE BLOOD HOLD SPECIMEN: NORMAL
WHOLE BLOOD HOLD SPECIMEN: NORMAL

## 2019-07-26 PROCEDURE — 99284 EMERGENCY DEPT VISIT MOD MDM: CPT

## 2019-07-26 PROCEDURE — 96360 HYDRATION IV INFUSION INIT: CPT

## 2019-07-26 PROCEDURE — 80053 COMPREHEN METABOLIC PANEL: CPT | Performed by: FAMILY MEDICINE

## 2019-07-26 PROCEDURE — 85025 COMPLETE CBC W/AUTO DIFF WBC: CPT | Performed by: FAMILY MEDICINE

## 2019-07-26 PROCEDURE — 83690 ASSAY OF LIPASE: CPT | Performed by: FAMILY MEDICINE

## 2019-07-26 RX ORDER — SODIUM CHLORIDE 0.9 % (FLUSH) 0.9 %
10 SYRINGE (ML) INJECTION AS NEEDED
Status: DISCONTINUED | OUTPATIENT
Start: 2019-07-26 | End: 2019-07-27 | Stop reason: HOSPADM

## 2019-07-26 RX ADMIN — SODIUM CHLORIDE 1000 ML: 900 INJECTION, SOLUTION INTRAVENOUS at 21:05

## 2019-07-27 VITALS
OXYGEN SATURATION: 100 % | BODY MASS INDEX: 44.81 KG/M2 | DIASTOLIC BLOOD PRESSURE: 74 MMHG | SYSTOLIC BLOOD PRESSURE: 135 MMHG | HEART RATE: 67 BPM | TEMPERATURE: 97.9 F | HEIGHT: 66 IN | RESPIRATION RATE: 16 BRPM | WEIGHT: 278.8 LBS

## 2019-08-06 LAB
ADV 40+41 DNA STL QL NAA+NON-PROBE: NOT DETECTED
ASTRO TYP 1-8 RNA STL QL NAA+NON-PROBE: NOT DETECTED
C CAYETANENSIS DNA STL QL NAA+NON-PROBE: NOT DETECTED
C DIFF TOX GENS STL QL NAA+PROBE: NOT DETECTED
CAMPY SP DNA.DIARRHEA STL QL NAA+PROBE: NOT DETECTED
CRYPTOSP STL CULT: NOT DETECTED
E COLI DNA SPEC QL NAA+PROBE: NOT DETECTED
E HISTOLYT AG STL-ACNC: NOT DETECTED
EAEC PAA PLAS AGGR+AATA ST NAA+NON-PRB: NOT DETECTED
EC STX1 + STX2 GENES STL NAA+PROBE: NOT DETECTED
EPEC EAE GENE STL QL NAA+NON-PROBE: NOT DETECTED
ETEC LTA+ST1A+ST1B TOX ST NAA+NON-PROBE: NOT DETECTED
G LAMBLIA DNA SPEC QL NAA+PROBE: NOT DETECTED
NOROVIRUS GI+II RNA STL QL NAA+NON-PROBE: NOT DETECTED
P SHIGELLOIDES DNA STL QL NAA+PROBE: NOT DETECTED
RV RNA STL NAA+PROBE: NOT DETECTED
SALMONELLA DNA SPEC QL NAA+PROBE: DETECTED
SAPO I+II+IV+V RNA STL QL NAA+NON-PROBE: NOT DETECTED
SHIGELLA SP+EIEC IPAH STL QL NAA+PROBE: NOT DETECTED
V CHOLERAE DNA SPEC QL NAA+PROBE: NOT DETECTED
VIBRIO DNA SPEC NAA+PROBE: NOT DETECTED
YERSINIA STL CULT: NOT DETECTED

## 2020-02-04 ENCOUNTER — OFFICE VISIT (OUTPATIENT)
Dept: FAMILY MEDICINE CLINIC | Facility: CLINIC | Age: 45
End: 2020-02-04

## 2020-02-04 ENCOUNTER — APPOINTMENT (OUTPATIENT)
Dept: LAB | Facility: HOSPITAL | Age: 45
End: 2020-02-04

## 2020-02-04 VITALS
BODY MASS INDEX: 44.03 KG/M2 | DIASTOLIC BLOOD PRESSURE: 100 MMHG | TEMPERATURE: 97.7 F | WEIGHT: 274 LBS | OXYGEN SATURATION: 99 % | HEIGHT: 66 IN | HEART RATE: 73 BPM | SYSTOLIC BLOOD PRESSURE: 140 MMHG

## 2020-02-04 DIAGNOSIS — G43.009 MIGRAINE WITHOUT AURA AND WITHOUT STATUS MIGRAINOSUS, NOT INTRACTABLE: ICD-10-CM

## 2020-02-04 DIAGNOSIS — Z79.899 LONG TERM USE OF DRUG: Primary | ICD-10-CM

## 2020-02-04 DIAGNOSIS — I10 ESSENTIAL HYPERTENSION: ICD-10-CM

## 2020-02-04 DIAGNOSIS — H91.92 HEARING LOSS OF LEFT EAR, UNSPECIFIED HEARING LOSS TYPE: ICD-10-CM

## 2020-02-04 DIAGNOSIS — M54.30 SCIATIC NERVE PAIN, UNSPECIFIED LATERALITY: ICD-10-CM

## 2020-02-04 PROCEDURE — 80307 DRUG TEST PRSMV CHEM ANLYZR: CPT | Performed by: STUDENT IN AN ORGANIZED HEALTH CARE EDUCATION/TRAINING PROGRAM

## 2020-02-04 PROCEDURE — G0481 DRUG TEST DEF 8-14 CLASSES: HCPCS | Performed by: STUDENT IN AN ORGANIZED HEALTH CARE EDUCATION/TRAINING PROGRAM

## 2020-02-04 PROCEDURE — 99213 OFFICE O/P EST LOW 20 MIN: CPT | Performed by: STUDENT IN AN ORGANIZED HEALTH CARE EDUCATION/TRAINING PROGRAM

## 2020-02-04 RX ORDER — GABAPENTIN 300 MG/1
CAPSULE ORAL
Qty: 90 CAPSULE | Refills: 2 | Status: CANCELLED | OUTPATIENT
Start: 2020-02-04

## 2020-02-04 RX ORDER — LOSARTAN POTASSIUM 50 MG/1
50 TABLET ORAL DAILY
Qty: 30 TABLET | Refills: 5 | OUTPATIENT
Start: 2020-02-04 | End: 2020-08-21

## 2020-02-04 RX ORDER — BUTALBITAL, ACETAMINOPHEN, CAFFEINE AND CODEINE PHOSPHATE 300; 50; 40; 30 MG/1; MG/1; MG/1; MG/1
1 CAPSULE ORAL EVERY 6 HOURS PRN
Qty: 30 CAPSULE | Refills: 0 | Status: SHIPPED | OUTPATIENT
Start: 2020-02-04 | End: 2020-08-21

## 2020-02-04 RX ORDER — TIZANIDINE 4 MG/1
4 TABLET ORAL EVERY 8 HOURS PRN
Qty: 90 TABLET | Refills: 3 | OUTPATIENT
Start: 2020-02-04 | End: 2021-07-27

## 2020-02-04 NOTE — PROGRESS NOTES
"ID: Michelle Lange    CC:   Chief Complaint   Patient presents with   • Establish Care   • Ear Fullness     popped yesterday and now cant hear out of the left one       Subjective:     Michelle Lange is a 45 y.o. female who presents for:    HPI   Patient presents to [facility].  Her main complaint is hearing loss in her left ear.  Patient has been suffering from URI symptoms for the last \"a little while \".  Couple weeks ago she had a sudden pop in her left ear and loss of hearing.  She reports it feels like she is underwater.  No drainage or pain.  No fevers or chills.  Patient continues to have nasal congestion and rhinorrhea.    She also needs refills of her medications including Fioricet.    [facility] #: [id number]    Past Medical Hx:  Past Medical History:   Diagnosis Date   • Excessive or frequent menstruation    • GERD (gastroesophageal reflux disease)    • History of mammogram 2016   • Otitis media    • Otorrhea        Past Surgical Hx:  Past Surgical History:   Procedure Laterality Date   •  SECTION     • CHOLECYSTECTOMY     • GASTRIC SLEEVE LAPAROSCOPIC     • INJECTION OF MEDICATION  2015    Kenalog (1)     • OTHER SURGICAL HISTORY  2014    Hysteroscope procedure (1)    Exam under anesthesia, diagnostic hysteroscopy with fractional dilation and curettage and NovaSure endometrial ablation.    • TUBAL ABDOMINAL LIGATION         Health Maintenance:  Health Maintenance   Topic Date Due   • ANNUAL PHYSICAL  1979   • TDAP/TD VACCINES (1 - Tdap) 1987   • PAP SMEAR  2017   • INFLUENZA VACCINE  2019       Current Meds:    Current Outpatient Medications:   •  albuterol (PROVENTIL HFA;VENTOLIN HFA) 108 (90 Base) MCG/ACT inhaler, Inhale 2 puffs Every 4 (Four) Hours As Needed for Wheezing., Disp: , Rfl:   •  loratadine (CLARITIN) 10 MG tablet, Take 1 tablet by mouth Daily., Disp: 30 tablet, Rfl: 0  •  losartan (COZAAR) 50 MG tablet, Take 1 tablet by mouth " "Daily., Disp: 30 tablet, Rfl: 5  •  Omeprazole (PRILOSEC PO), Prilosec, Disp: , Rfl:   •  tiZANidine (ZANAFLEX) 4 MG tablet, Take 1 tablet by mouth Every 8 (Eight) Hours As Needed for Muscle Spasms., Disp: 90 tablet, Rfl: 3  •  Butalbital-APAP-Caff-Cod (FIORICET/CODEINE) -89-30 MG capsule, Take 1 each by mouth Every 6 (Six) Hours As Needed (Migraine)., Disp: 30 capsule, Rfl: 0    Allergies:  Ibuprofen    Family Hx:  Family History   Problem Relation Age of Onset   • Diabetes Other    • Heart disease Other    • Hypertension Other    • Stroke Other    • Seizures Other         Social History:  Social History     Socioeconomic History   • Marital status:      Spouse name: Not on file   • Number of children: Not on file   • Years of education: Not on file   • Highest education level: Not on file   Tobacco Use   • Smoking status: Never Smoker   • Smokeless tobacco: Never Used   Substance and Sexual Activity   • Alcohol use: No   • Drug use: No   • Sexual activity: Defer       Review of Systems   Constitutional: Negative for chills, diaphoresis, fatigue and fever.   HENT: Positive for congestion, hearing loss and rhinorrhea. Negative for ear discharge, ear pain, sneezing and sore throat.    Respiratory: Negative for cough, shortness of breath and wheezing.    Cardiovascular: Negative for chest pain and palpitations.   Gastrointestinal: Negative for abdominal distention, constipation, diarrhea and nausea.   Neurological: Negative for dizziness and headaches.   Psychiatric/Behavioral: Negative for sleep disturbance.           Objective:     /100 (BP Location: Left arm)   Pulse 73   Temp 97.7 °F (36.5 °C)   Ht 167.6 cm (65.98\")   Wt 124 kg (274 lb)   SpO2 99%   BMI 44.25 kg/m²     Physical Exam   Constitutional: She is oriented to person, place, and time. She appears well-developed and well-nourished. No distress.   HENT:   Head: Normocephalic and atraumatic.   Right Ear: Tympanic membrane and ear " canal normal.   Left Ear: Tympanic membrane and ear canal normal.   Eyes: Conjunctivae are normal.   Cardiovascular: Normal rate and regular rhythm.   Pulmonary/Chest: Effort normal and breath sounds normal. No respiratory distress.   Abdominal: Soft. Bowel sounds are normal. There is no tenderness.   Neurological: She is alert and oriented to person, place, and time. No cranial nerve deficit.   Skin: Skin is warm and dry. Capillary refill takes less than 2 seconds. She is not diaphoretic.   Psychiatric: She has a normal mood and affect. Her behavior is normal.              Assessment/Plan:   Michelle Lange is a 45 y.o. female who was seen in clinic for:     Diagnosis Plan   1. Long term use of drug  ToxASSURE Select 13 Discrete -   2. Migraine without aura and without status migrainosus, not intractable  Butalbital-APAP-Caff-Cod (FIORICET/CODEINE) -43-30 MG capsule   3. Sciatic nerve pain, unspecified laterality  tiZANidine (ZANAFLEX) 4 MG tablet   4. Essential hypertension  losartan (COZAAR) 50 MG tablet   5. Hearing loss of left ear, unspecified hearing loss type  Ambulatory Referral to Audiology     Refill the patient's medication today.  She signed a pain contract with us today for the Fioricet.  Referral to audiology to test for hearing loss.  TM does not appear ruptured on exam.  No fluid behind the eardrum.  No signs of infection.  Case discussed with Dr. Johnston who is in agreement.    Follow-up:     Return in about 2 weeks (around 2/18/2020) for Recheck hearing loss.      Goals: weight loss  Barriers to goals: pt compliance    Health Maintenance   Topic Date Due   • ANNUAL PHYSICAL  01/28/1979   • TDAP/TD VACCINES (1 - Tdap) 01/28/1987   • PAP SMEAR  06/20/2017   • INFLUENZA VACCINE  08/01/2019       Tobacco: nonsmoker  Alcohol: does not drink  Lifestyle: Body mass index is 44.25 kg/m². eat more fruits and vegetables, decrease soda or juice intake, reduce screen time, reduce portion size, eat  breakfast and have 3 meals a day    RISK SCORE: 4        This document has been electronically signed by Abiodun Spears MD on February 4, 2020 3:17 PM

## 2020-02-05 NOTE — PROGRESS NOTES
I have seen the patient.  I have reviewed the notes, assessments, and/or procedures performed by Abiodun Spears MD, I concur with her/his documentation and assessment and plan for Michelle Cárdenasmalina Nazario.               This document has been electronically signed by Chase Johnston MD on February 5, 2020 10:19 AM

## 2020-02-10 LAB
6-ACETYLMORPHINE: NEGATIVE
6MAM SERPLBLD-MCNC: NOT DETECTED NG/MG CREAT
7-AMINOCLONAZEPAM UR: NOT DETECTED NG/MG CREAT
A-OH ALPRAZ/CREAT UR: NOT DETECTED NG/MG CREAT
ALFENTANIL UR QL: NOT DETECTED NG/MG CREAT
ALPHA-HYDROXYMIDAZOLAM, URINE: NOT DETECTED NG/MG CREAT
ALPHA-HYDROXYTRIAZOLAM, URINE: NOT DETECTED NG/MG CREAT
AMOBARBITAL UR: NOT DETECTED
AMPHET UR QL CFM: NOT DETECTED NG/MG CREAT
AMPHETAMINES UR QL SCN: NEGATIVE
BARBITAL UR QL CFM: NOT DETECTED
BARBITURATES UR QL SCN: NORMAL
BENZODIAZ UR QL SCN: NEGATIVE
BENZOYLECGONINE UR: NOT DETECTED NG/MG CREAT
BUPRENORPHINE UR QL: NEGATIVE
BUPRENORPHINE UR QL: NOT DETECTED NG/MG CREAT
BUTABARBITAL UR QL: NOT DETECTED
BUTALBITAL UR QL: PRESENT
CANNABINOIDS UR QL CFM: NEGATIVE
CLONAZEPAM UR QL: NOT DETECTED NG/MG CREAT
COCAETHYLENE UR QL CFM: NOT DETECTED NG/MG CREAT
COCAINE UR QL CFM: NEGATIVE
CODEINE UR QL: NOT DETECTED NG/MG CREAT
CONV COCAINE, UR: NOT DETECTED NG/MG CREAT
CONV REPORT SUMMARY: NORMAL
CREAT 24H UR-MCNC: 211 MG/DL
DESALKYLFLURAZ/CREAT UR: NOT DETECTED NG/MG CREAT
DESMETHYLFLUNITRAZEPAM: NOT DETECTED NG/MG CREAT
DIAZEPAM UR-MCNC: NOT DETECTED NG/MG CREAT
DIHYDROCODEINE UR: NOT DETECTED NG/MG CREAT
EDDP SERPL QL: NOT DETECTED NG/MG CREAT
ETHANOL SCREEN URINE (REF): NEGATIVE
ETHANOL UR-MCNC: NOT DETECTED G/DL
FENTANYL UR QL: NEGATIVE
FENTANYL+NORFENTANYL UR QL SCN: NOT DETECTED NG/MG CREAT
FLUNITRAZEPAM SERPLBLD-MCNC: NOT DETECTED NG/MG CREAT
HYDROCODONE UR QL: NOT DETECTED NG/MG CREAT
HYDROMORPHONE UR QL: NOT DETECTED NG/MG CREAT
LEVEL OF DETECTION:: NORMAL
LORAZEPAM UR-MCNC: NOT DETECTED NG/MG CREAT
LORAZEPAM/CREAT UR: NOT DETECTED NG/MG CREAT
MDA SERPLBLD-MCNC: NOT DETECTED NG/MG CREAT
MDMA UR QL SCN: NOT DETECTED NG/MG CREAT
MEPHOBARBITAL UR QL CFM: NOT DETECTED
METHADONE BLD QL SCN: NEGATIVE
METHADONE, URINE: NOT DETECTED NG/MG CREAT
METHAMPHETAMINE UR: NOT DETECTED NG/MG CREAT
MIDAZOLAM UR-MCNC: NOT DETECTED NG/MG CREAT
MORPHINE UR QL: NOT DETECTED NG/MG CREAT
N-DESMETHYLTRAMADOL, U: NOT DETECTED NG/MG CREAT
NARCOTICS UR: NEGATIVE
NORBUPRENORPHINE SERPLBLD-MCNC: NOT DETECTED NG/MG CREAT
NORCODEINE UR-MCNC: NOT DETECTED NG/MG CREAT
NORDIAZEPAM SERPL CFM-MCNC: NOT DETECTED NG/MG CREAT
NORFENTANYL UR: NOT DETECTED NG/MG CREAT
NORMORPHINE: NOT DETECTED NG/MG CREAT
NOROXYMORPHONE: NOT DETECTED NG/MG CREAT
O-DESMETHYLTRAMADOL, UR: NOT DETECTED NG/MG CREAT
OPIATES UR QL CFM: NEGATIVE
OPIATES, URINE, NORHYDROCODONE: NOT DETECTED NG/MG CREAT
OPIATES, URINE, NOROXYCODONE: NOT DETECTED NG/MG CREAT
OXAZEPAM UR QL: NOT DETECTED NG/MG CREAT
OXYCODONE UR QL: NEGATIVE
OXYCODONE UR: NOT DETECTED NG/MG CREAT
OXYMORPHONE UR: NOT DETECTED NG/MG CREAT
PENTOBARBITAL UR: NOT DETECTED
PHENOBARB UR QL: NOT DETECTED
SECOBARBITAL UR QL: NOT DETECTED
SUFENTANIL UR QL: NOT DETECTED NG/MG CREAT
TAPENTADOL SERPLBLD-MCNC: NEGATIVE NG/ML
TAPENTADOL UR-MCNC: NOT DETECTED NG/MG CREAT
TEMAZEPAM UR QL CFM: NOT DETECTED NG/MG CREAT
THC UR CFM-MCNC: NOT DETECTED NG/MG CREAT
THIOPENTAL UR QL CFM: NOT DETECTED
TRAMADOL UR: NOT DETECTED NG/MG CREAT

## 2020-03-26 ENCOUNTER — TELEPHONE (OUTPATIENT)
Dept: FAMILY MEDICINE CLINIC | Facility: CLINIC | Age: 45
End: 2020-03-26

## 2020-03-26 RX ORDER — OMEPRAZOLE 40 MG/1
40 CAPSULE, DELAYED RELEASE ORAL DAILY
Qty: 30 CAPSULE | Refills: 5 | Status: SHIPPED | OUTPATIENT
Start: 2020-03-26 | End: 2021-10-05

## 2020-07-08 ENCOUNTER — TELEPHONE (OUTPATIENT)
Dept: FAMILY MEDICINE CLINIC | Facility: CLINIC | Age: 45
End: 2020-07-08

## 2020-07-08 NOTE — TELEPHONE ENCOUNTER
"1321 Patient called Arlene Guevara with a complaint. She was 5 min early to her appt and [facility] [place]. Arlene transferred the patient call to me. The patient was belligerent and stated she needed her medication and she had an appt at 1330 and stated \"they would not let me in.\" and \"It's stupid to send me back to the hot car.\"  She continued yelling about the situation. I asked her to please calm down and I would be happy to help. She continued to speak loudly and stated she \"wanted to speak with someone higher up.\"  I told her I am able to help and informed her, if she would come back to our office, we would see her and get her medication refilled and the podiatry referral. Patient continued to be belligerent and stated she \"wanted to talk to someone higher than you.\"  Patient Representative number was given to patient. She stated \"they are not high enough.\" I told her to leave a message and someone would get back to her. I asked the patient, again, to come to the office and we would be happy to see her. Patient stated she \"has to work.\" I informed that her appt was at 1330 and she would have time to be seen. Patient declined, stating she \"will be going to another doctor\" and hung up.  "

## 2020-08-12 ENCOUNTER — TRANSCRIBE ORDERS (OUTPATIENT)
Dept: PODIATRY | Facility: CLINIC | Age: 45
End: 2020-08-12

## 2020-08-12 DIAGNOSIS — M79.672 BILATERAL FOOT PAIN: Primary | ICD-10-CM

## 2020-08-12 DIAGNOSIS — M79.671 BILATERAL FOOT PAIN: Primary | ICD-10-CM

## 2020-08-21 PROCEDURE — U0002 COVID-19 LAB TEST NON-CDC: HCPCS | Performed by: EMERGENCY MEDICINE

## 2020-09-16 ENCOUNTER — OFFICE VISIT (OUTPATIENT)
Dept: PODIATRY | Facility: CLINIC | Age: 45
End: 2020-09-16

## 2020-09-16 VITALS — WEIGHT: 293 LBS | BODY MASS INDEX: 47.09 KG/M2 | OXYGEN SATURATION: 99 % | HEIGHT: 66 IN | HEART RATE: 85 BPM

## 2020-09-16 DIAGNOSIS — M79.671 BILATERAL FOOT PAIN: Primary | ICD-10-CM

## 2020-09-16 DIAGNOSIS — M72.2 PLANTAR FASCIITIS: ICD-10-CM

## 2020-09-16 DIAGNOSIS — M79.672 BILATERAL FOOT PAIN: Primary | ICD-10-CM

## 2020-09-16 PROCEDURE — 99204 OFFICE O/P NEW MOD 45 MIN: CPT | Performed by: PODIATRIST

## 2020-09-16 RX ORDER — METHYLPREDNISOLONE 4 MG/1
TABLET ORAL
Qty: 21 TABLET | Refills: 0 | Status: SHIPPED | OUTPATIENT
Start: 2020-09-16 | End: 2021-08-02

## 2020-09-16 NOTE — PROGRESS NOTES
Michelle Lange  1975  45 y.o. female     Patient presents today with a complaint of bilateral foot pain; xrays obtained today; R>L.    2020    Chief Complaint   Patient presents with   • Left Foot - Pain   • Right Foot - Pain       History of Present Illness    Michelle Lange is a 45 y.o.female who presents to clinic today with chief complaint of foot pain.  Pain is located to the bottom of both of her heels.  She states that she has had pain for 8 months to a year she rates the pain as a 3 out of 10..  The right foot hurts worse than the left.  She describes her pain as sharp and achy and worse with the first up in the morning or after periods of rest.  There are no associated injuries.  She has tried icing, Tylenol and massaging which have not helped.  She has also tried heel pads which have not helped.  She has no other pedal complaints.    Past Medical History:   Diagnosis Date   • Asthma    • Excessive or frequent menstruation    • GERD (gastroesophageal reflux disease)    • History of mammogram 2016   • Hypertension    • Otitis media    • Otorrhea    • Plantar fasciitis          Past Surgical History:   Procedure Laterality Date   •  SECTION     • CHOLECYSTECTOMY     • GASTRIC SLEEVE LAPAROSCOPIC     • INJECTION OF MEDICATION  2015    Kenalog (1)     • KNEE SURGERY Right    • OTHER SURGICAL HISTORY  2014    Hysteroscope procedure (1)    Exam under anesthesia, diagnostic hysteroscopy with fractional dilation and curettage and NovaSure endometrial ablation.    • TUBAL ABDOMINAL LIGATION           Family History   Problem Relation Age of Onset   • Diabetes Other    • Heart disease Other    • Hypertension Other    • Stroke Other    • Seizures Other    • Heart disease Mother    • Hypertension Mother    • Diabetes Father    • Heart disease Father    • Hypertension Father    • Osteoporosis Father    • Diabetes Maternal Grandmother    • Hypertension Maternal  Grandmother    • Diabetes Paternal Grandmother    • Heart disease Paternal Grandmother    • Hypertension Paternal Grandmother    • Osteoporosis Paternal Grandmother        Allergies   Allergen Reactions   • Ibuprofen Anaphylaxis       Social History     Socioeconomic History   • Marital status:      Spouse name: Not on file   • Number of children: Not on file   • Years of education: Not on file   • Highest education level: Not on file   Tobacco Use   • Smoking status: Never Smoker   • Smokeless tobacco: Never Used   Substance and Sexual Activity   • Alcohol use: No   • Drug use: No   • Sexual activity: Defer         Current Outpatient Medications   Medication Sig Dispense Refill   • albuterol (PROVENTIL HFA;VENTOLIN HFA) 108 (90 Base) MCG/ACT inhaler Inhale 2 puffs Every 4 (Four) Hours As Needed for Wheezing.     • gabapentin (NEURONTIN) 600 MG tablet Take 600 mg by mouth.     • irbesartan (AVAPRO) 150 MG tablet Take 150 mg by mouth.     • loratadine (CLARITIN) 10 MG tablet Take 1 tablet by mouth Daily. 30 tablet 0   • omeprazole (PrilOSEC) 40 MG capsule Take 1 capsule by mouth Daily. 30 capsule 5   • tiZANidine (ZANAFLEX) 4 MG tablet Take 1 tablet by mouth Every 8 (Eight) Hours As Needed for Muscle Spasms. 90 tablet 3   • butalbital-acetaminophen-caffeine (FIORICET, ESGIC) -40 MG per tablet Take 1 tablet by mouth.     • ergocalciferol (ERGOCALCIFEROL) 1.25 MG (75929 UT) capsule Take 50,000 Units by mouth.     • methylPREDNISolone (MEDROL) 4 MG dose pack Take as directed 21 tablet 0     No current facility-administered medications for this visit.        Review of Systems   Constitutional: Negative.    Eyes: Negative.    Respiratory: Negative.    Cardiovascular: Positive for leg swelling.   Gastrointestinal: Negative.    Endocrine: Negative.    Genitourinary: Negative.    Musculoskeletal: Positive for arthralgias, back pain and joint swelling.        Foot pain  Ankle pain   Neurological: Positive for  "dizziness, numbness and headaches.   All other systems reviewed and are negative.        OBJECTIVE    Pulse 85   Ht 167.6 cm (66\")   Wt (!) 172 kg (380 lb)   SpO2 99%   BMI 61.33 kg/m²       Physical Exam  Vitals signs reviewed.   Constitutional:       General: She is not in acute distress.     Appearance: Normal appearance. She is well-developed. She is not ill-appearing.   HENT:      Head: Normocephalic and atraumatic.      Right Ear: External ear normal.      Left Ear: External ear normal.      Nose: Nose normal.   Eyes:      Conjunctiva/sclera: Conjunctivae normal.      Pupils: Pupils are equal, round, and reactive to light.   Cardiovascular:      Rate and Rhythm: Normal rate.      Pulses: Normal pulses.           Dorsalis pedis pulses are 2+ on the right side and 2+ on the left side.        Posterior tibial pulses are 2+ on the right side and 2+ on the left side.   Pulmonary:      Effort: Pulmonary effort is normal. No respiratory distress.      Breath sounds: No wheezing.   Musculoskeletal: Normal range of motion.         General: Tenderness present. No deformity.      Right foot: Normal range of motion. No deformity or prominent metatarsal heads.      Left foot: Normal range of motion. No deformity or prominent metatarsal heads.   Feet:      Right foot:      Skin integrity: Skin integrity normal.      Left foot:      Skin integrity: Skin integrity normal.   Skin:     General: Skin is warm and dry.      Capillary Refill: Capillary refill takes less than 2 seconds.   Neurological:      Mental Status: She is alert and oriented to person, place, and time.   Psychiatric:         Behavior: Behavior normal.         Thought Content: Thought content normal.         Gait: normal     Assistive Device: none    Lower Extremity    Cardiovascular:    DP/PT pulses palpable bilateral  CFT brisk  to all digits  Skin temp is warm to warm from proximal tibia to distal digits bilateral  Musculoskeletal:  Muscle strength is " 5/5 for all muscle groups tested   ROM of the 1st MTP is WNL bilateral   ROM of the MTJ is WNL bilateral   ROM of the STJ is WNL bilateral   ROM of the ankle joint is  WNL bilateral   Pain on palpation to the medial tubercle bilateral calcaneus.  Negative lateral squeeze test.  Dermatological:   Skin is warm, dry and intact bilateral  Webspaces 1-4 bilateral are clean, dry and intact.   No subcutaneous nodules or masses noted    Neurological:   Sensation intact to light touch        Procedures        ASSESSMENT AND PLAN    Michelle was seen today for pain and pain.    Diagnoses and all orders for this visit:    Bilateral foot pain  -     XR foot 3+ vw bilateral; Future    Plantar fasciitis    Other orders  -     methylPREDNISolone (MEDROL) 4 MG dose pack; Take as directed      - Comprehensive foot and ankle exam performed  - X-rays taken and reviewed.  No acute osseous or articular abnormalities.  - Rx for medrol dose alana  - Patient advised to stretch, ice and to make appropriate shoe gear changes to include wearing athletic type shoes with supportive insoles. Patient was given written instructions on how to correctly perform the stretching of the Achilles tendon/calf stretches, and the heel spur/plantar fasciitis regimen. Limit bare foot walking.    - Recommended over-the-counter insole such as power steps, spenco or walk fit  to properly support the arch in order to alleviate the tension and stress on the plantar fascia associated with normal daily walking. Patient was educated on the break-in period for new arch supports.  - All questions were answered to the patient's satisfaction.  - RTC in 6-8 weeks as needed            This document has been electronically signed by Ishmael Castañeda DPM on September 19, 2020 10:03 CDT     9/19/2020  10:03 CDT

## 2021-01-04 ENCOUNTER — IMMUNIZATION (OUTPATIENT)
Dept: VACCINE CLINIC | Facility: HOSPITAL | Age: 46
End: 2021-01-04

## 2021-01-04 PROCEDURE — 91300 HC SARSCOV02 VAC 30MCG/0.3ML IM: CPT | Performed by: THORACIC SURGERY (CARDIOTHORACIC VASCULAR SURGERY)

## 2021-01-04 PROCEDURE — 0001A: CPT | Performed by: THORACIC SURGERY (CARDIOTHORACIC VASCULAR SURGERY)

## 2021-01-25 ENCOUNTER — IMMUNIZATION (OUTPATIENT)
Dept: VACCINE CLINIC | Facility: HOSPITAL | Age: 46
End: 2021-01-25

## 2021-01-25 PROCEDURE — 91300 HC SARSCOV02 VAC 30MCG/0.3ML IM: CPT | Performed by: THORACIC SURGERY (CARDIOTHORACIC VASCULAR SURGERY)

## 2021-01-25 PROCEDURE — 0002A: CPT | Performed by: THORACIC SURGERY (CARDIOTHORACIC VASCULAR SURGERY)

## 2021-03-03 ENCOUNTER — TELEPHONE (OUTPATIENT)
Dept: FAMILY MEDICINE CLINIC | Facility: CLINIC | Age: 46
End: 2021-03-03

## 2021-03-03 NOTE — TELEPHONE ENCOUNTER
CALLED PATIENT TO SCHEDULE AN APT WITH OUR OFFICE FOR AN ANNUAL PHYSICAL. PATIENT STATED SHE IS BEING SEEN ELSEWHERE AND IS NOT INTERESTED IN SCHEDULING AT THIS TIME.    THANKS,  ANGELICA

## 2021-06-28 ENCOUNTER — OFFICE VISIT (OUTPATIENT)
Dept: PODIATRY | Facility: CLINIC | Age: 46
End: 2021-06-28

## 2021-06-28 VITALS
WEIGHT: 293 LBS | DIASTOLIC BLOOD PRESSURE: 88 MMHG | HEART RATE: 84 BPM | BODY MASS INDEX: 47.09 KG/M2 | HEIGHT: 66 IN | SYSTOLIC BLOOD PRESSURE: 148 MMHG | OXYGEN SATURATION: 97 %

## 2021-06-28 DIAGNOSIS — M79.672 BILATERAL FOOT PAIN: Primary | ICD-10-CM

## 2021-06-28 DIAGNOSIS — M72.2 PLANTAR FASCIITIS: ICD-10-CM

## 2021-06-28 DIAGNOSIS — M79.671 BILATERAL FOOT PAIN: Primary | ICD-10-CM

## 2021-06-28 PROCEDURE — 99213 OFFICE O/P EST LOW 20 MIN: CPT | Performed by: PODIATRIST

## 2021-06-28 PROCEDURE — 20550 NJX 1 TENDON SHEATH/LIGAMENT: CPT | Performed by: PODIATRIST

## 2021-06-28 RX ORDER — TRIAMCINOLONE ACETONIDE 40 MG/ML
10 INJECTION, SUSPENSION INTRA-ARTICULAR; INTRAMUSCULAR ONCE
Status: COMPLETED | OUTPATIENT
Start: 2021-06-28 | End: 2021-06-28

## 2021-06-28 RX ORDER — BUPIVACAINE HYDROCHLORIDE 5 MG/ML
1 INJECTION, SOLUTION EPIDURAL; INTRACAUDAL ONCE
Status: COMPLETED | OUTPATIENT
Start: 2021-06-28 | End: 2021-06-28

## 2021-06-28 RX ORDER — AMLODIPINE BESYLATE 10 MG/1
10 TABLET ORAL DAILY
COMMUNITY
Start: 2021-04-13

## 2021-06-28 RX ORDER — HYDROCHLOROTHIAZIDE 12.5 MG/1
TABLET ORAL
COMMUNITY
Start: 2021-04-13 | End: 2021-11-29 | Stop reason: SDDI

## 2021-06-28 RX ORDER — DEXAMETHASONE SODIUM PHOSPHATE 10 MG/ML
5 INJECTION INTRAMUSCULAR; INTRAVENOUS ONCE
Status: COMPLETED | OUTPATIENT
Start: 2021-06-28 | End: 2021-06-28

## 2021-06-28 RX ORDER — BUPROPION HYDROCHLORIDE 150 MG/1
150 TABLET ORAL NIGHTLY
COMMUNITY
Start: 2021-05-12

## 2021-06-28 RX ADMIN — TRIAMCINOLONE ACETONIDE 10 MG: 40 INJECTION, SUSPENSION INTRA-ARTICULAR; INTRAMUSCULAR at 17:12

## 2021-06-28 RX ADMIN — BUPIVACAINE HYDROCHLORIDE 1 ML: 5 INJECTION, SOLUTION EPIDURAL; INTRACAUDAL at 17:11

## 2021-06-28 RX ADMIN — BUPIVACAINE HYDROCHLORIDE 1 ML: 5 INJECTION, SOLUTION EPIDURAL; INTRACAUDAL at 17:10

## 2021-06-28 RX ADMIN — DEXAMETHASONE SODIUM PHOSPHATE 5 MG: 10 INJECTION INTRAMUSCULAR; INTRAVENOUS at 17:11

## 2021-06-28 NOTE — PROGRESS NOTES
Michelle Lange  1975  46 y.o. female     2021     Chief Complaint   Patient presents with   • Left Foot - Pain   • Right Foot - Pain   • Plantar Fasciitis       History of Present Illness    Michelle Lange is a 46 y.o.female who presents to clinic today with chief complaint of foot pain.  Pain is located to the bottom of both heels.  She rates as a 8 out of 10.  The left foot hurts worse than the right foot.  There are no associated injuries.    Past Medical History:   Diagnosis Date   • Asthma    • Excessive or frequent menstruation    • GERD (gastroesophageal reflux disease)    • History of mammogram 2016   • Hypertension    • Otitis media    • Otorrhea    • Plantar fasciitis          Past Surgical History:   Procedure Laterality Date   •  SECTION     • CHOLECYSTECTOMY     • GASTRIC SLEEVE LAPAROSCOPIC     • INJECTION OF MEDICATION  2015    Kenalog (1)     • KNEE SURGERY Right    • OTHER SURGICAL HISTORY  2014    Hysteroscope procedure (1)    Exam under anesthesia, diagnostic hysteroscopy with fractional dilation and curettage and NovaSure endometrial ablation.    • TUBAL ABDOMINAL LIGATION           Family History   Problem Relation Age of Onset   • Diabetes Other    • Heart disease Other    • Hypertension Other    • Stroke Other    • Seizures Other    • Heart disease Mother    • Hypertension Mother    • Diabetes Father    • Heart disease Father    • Hypertension Father    • Osteoporosis Father    • Diabetes Maternal Grandmother    • Hypertension Maternal Grandmother    • Diabetes Paternal Grandmother    • Heart disease Paternal Grandmother    • Hypertension Paternal Grandmother    • Osteoporosis Paternal Grandmother        Allergies   Allergen Reactions   • Ibuprofen Anaphylaxis       Social History     Socioeconomic History   • Marital status:      Spouse name: Not on file   • Number of children: Not on file   • Years of education: Not on file   •  "Highest education level: Not on file   Tobacco Use   • Smoking status: Never Smoker   • Smokeless tobacco: Never Used   Vaping Use   • Vaping Use: Never used   Substance and Sexual Activity   • Alcohol use: No   • Drug use: No   • Sexual activity: Defer         Current Outpatient Medications   Medication Sig Dispense Refill   • albuterol (PROVENTIL HFA;VENTOLIN HFA) 108 (90 Base) MCG/ACT inhaler Inhale 2 puffs Every 4 (Four) Hours As Needed for Wheezing.     • butalbital-acetaminophen-caffeine (FIORICET, ESGIC) -40 MG per tablet Take 1 tablet by mouth.     • ergocalciferol (ERGOCALCIFEROL) 1.25 MG (81412 UT) capsule Take 50,000 Units by mouth.     • gabapentin (NEURONTIN) 600 MG tablet Take 600 mg by mouth.     • irbesartan (AVAPRO) 150 MG tablet Take 150 mg by mouth.     • methylPREDNISolone (MEDROL) 4 MG dose pack Take as directed 21 tablet 0   • omeprazole (PrilOSEC) 40 MG capsule Take 1 capsule by mouth Daily. 30 capsule 5   • tiZANidine (ZANAFLEX) 4 MG tablet Take 1 tablet by mouth Every 8 (Eight) Hours As Needed for Muscle Spasms. 90 tablet 3   • amLODIPine (NORVASC) 10 MG tablet      • buPROPion XL (WELLBUTRIN XL) 150 MG 24 hr tablet      • cyanocobalamin (VITAMIN B-12) 1000 MCG tablet Take 1,000 mcg by mouth Daily.     • hydroCHLOROthiazide (HYDRODIURIL) 12.5 MG tablet      • loratadine (CLARITIN) 10 MG tablet Take 1 tablet by mouth Daily. 30 tablet 0     No current facility-administered medications for this visit.       Review of Systems   Constitutional: Negative.    Eyes: Negative.    Respiratory: Negative.    Cardiovascular: Positive for leg swelling.   Gastrointestinal: Negative.    Musculoskeletal: Positive for arthralgias, back pain and joint swelling.        Foot pain  Ankle pain   Neurological: Positive for dizziness, numbness and headaches.   All other systems reviewed and are negative.        OBJECTIVE    /88   Pulse 84   Ht 167.6 cm (66\")   Wt (!) 172 kg (380 lb)   SpO2 97%   BMI " 61.33 kg/m²       Physical Exam  Vitals reviewed.   Constitutional:       General: She is not in acute distress.     Appearance: Normal appearance. She is well-developed. She is not ill-appearing.   HENT:      Head: Normocephalic and atraumatic.      Right Ear: External ear normal.      Left Ear: External ear normal.      Nose: Nose normal.   Eyes:      Conjunctiva/sclera: Conjunctivae normal.      Pupils: Pupils are equal, round, and reactive to light.   Cardiovascular:      Rate and Rhythm: Normal rate.      Pulses: Normal pulses.   Pulmonary:      Effort: Pulmonary effort is normal. No respiratory distress.      Breath sounds: No wheezing.   Musculoskeletal:         General: Tenderness present. No deformity. Normal range of motion.   Skin:     General: Skin is warm and dry.      Capillary Refill: Capillary refill takes less than 2 seconds.   Neurological:      Mental Status: She is alert and oriented to person, place, and time.   Psychiatric:         Behavior: Behavior normal.         Thought Content: Thought content normal.         Gait: normal     Assistive Device: none    Lower Extremity    Cardiovascular:    DP/PT pulses palpable bilateral  CFT brisk  to all digits  Skin temp is warm to warm from proximal tibia to distal digits bilateral  Musculoskeletal:  Muscle strength is 5/5 for all muscle groups tested   ROM of the 1st MTP is WNL bilateral   ROM of the MTJ is WNL bilateral   ROM of the STJ is WNL bilateral   ROM of the ankle joint is  WNL bilateral   Pain on palpation to the medial tubercle bilateral calcaneus.  Negative lateral squeeze test.  Dermatological:   Skin is warm, dry and intact bilateral  Webspaces 1-4 bilateral are clean, dry and intact.   No subcutaneous nodules or masses noted    Neurological:   Sensation intact to light touch        Procedures    Plantar Fasciits Injection  Date/Time: 06/28/2021  Performed by: BIANKA SHAW  Authorized by: BIANKA SHAW   Consent: Verbal consent  obtained. Written consent obtained.  Risks and benefits: risks, benefits and alternatives were discussed  Consent given by: patient  Patient identity confirmed: verbally with patient  Indications: pain relief  Nerve block body site: right and left heel.  Sedation:  Patient sedated: no    Patient position: sitting  Needle size: 27 G  Local anesthetic: 0.5% Marcaine plain, Kenalog 40 mg/ml , Decadron 4 mg/mL.   Outcome: pain improved  Patient tolerance: Patient tolerated the procedure well with no immediate complications     ASSESSMENT AND PLAN    Diagnoses and all orders for this visit:    1. Bilateral foot pain (Primary)    2. Plantar fasciitis      - Comprehensive foot and ankle exam performed  - steroid injections bilateral heels  - Encourage patient to stretch regularly.  Ice and anti-inflammatories as needed.  -  Discussed plantar fasciotomy which patient will consider if steroid injections do not help.  - All questions were answered to the patient's satisfaction.  - RTC as needed             This document has been electronically signed by Ishmael Castañeda DPM on June 28, 2021 16:16 CDT     6/28/2021  16:16 CDT

## 2021-07-27 ENCOUNTER — HOSPITAL ENCOUNTER (EMERGENCY)
Facility: HOSPITAL | Age: 46
Discharge: HOME | End: 2021-07-27
Attending: EMERGENCY MEDICINE | Admitting: EMERGENCY MEDICINE

## 2021-07-27 ENCOUNTER — APPOINTMENT (OUTPATIENT)
Dept: CT IMAGING | Facility: HOSPITAL | Age: 46
End: 2021-07-27

## 2021-07-27 ENCOUNTER — APPOINTMENT (OUTPATIENT)
Dept: GENERAL RADIOLOGY | Facility: HOSPITAL | Age: 46
End: 2021-07-27

## 2021-07-27 VITALS
BODY MASS INDEX: 47.09 KG/M2 | RESPIRATION RATE: 18 BRPM | HEIGHT: 66 IN | WEIGHT: 293 LBS | OXYGEN SATURATION: 98 % | SYSTOLIC BLOOD PRESSURE: 140 MMHG | DIASTOLIC BLOOD PRESSURE: 85 MMHG | HEART RATE: 62 BPM | TEMPERATURE: 98 F

## 2021-07-27 DIAGNOSIS — J18.9 PNEUMONIA OF BOTH LUNGS DUE TO INFECTIOUS ORGANISM, UNSPECIFIED PART OF LUNG: Primary | ICD-10-CM

## 2021-07-27 DIAGNOSIS — E04.1 THYROID NODULE: ICD-10-CM

## 2021-07-27 LAB
ANION GAP SERPL CALCULATED.3IONS-SCNC: 7 MMOL/L (ref 5–15)
APTT PPP: 30.2 SECONDS (ref 20–40.3)
BASOPHILS # BLD AUTO: 0.06 10*3/MM3 (ref 0–0.2)
BASOPHILS NFR BLD AUTO: 0.6 % (ref 0–1.5)
BUN SERPL-MCNC: 17 MG/DL (ref 6–20)
BUN/CREAT SERPL: 14.8 (ref 7–25)
CALCIUM SPEC-SCNC: 8.6 MG/DL (ref 8.6–10.5)
CHLORIDE SERPL-SCNC: 106 MMOL/L (ref 98–107)
CO2 SERPL-SCNC: 28 MMOL/L (ref 22–29)
CREAT SERPL-MCNC: 1.15 MG/DL (ref 0.57–1)
D-DIMER, QUANTITATIVE (MAD,POW, STR): 488 NG/ML (FEU) (ref 0–470)
DEPRECATED RDW RBC AUTO: 47.5 FL (ref 37–54)
EOSINOPHIL # BLD AUTO: 0.27 10*3/MM3 (ref 0–0.4)
EOSINOPHIL NFR BLD AUTO: 2.5 % (ref 0.3–6.2)
ERYTHROCYTE [DISTWIDTH] IN BLOOD BY AUTOMATED COUNT: 15.9 % (ref 12.3–15.4)
FLUAV RNA RESP QL NAA+PROBE: NOT DETECTED
FLUBV RNA RESP QL NAA+PROBE: NOT DETECTED
GFR SERPL CREATININE-BSD FRML MDRD: 51 ML/MIN/1.73
GLUCOSE SERPL-MCNC: 116 MG/DL (ref 65–99)
HCG SERPL QL: NEGATIVE
HCT VFR BLD AUTO: 35.4 % (ref 34–46.6)
HGB BLD-MCNC: 11.2 G/DL (ref 12–15.9)
IMM GRANULOCYTES # BLD AUTO: 0.03 10*3/MM3 (ref 0–0.05)
IMM GRANULOCYTES NFR BLD AUTO: 0.3 % (ref 0–0.5)
INR PPP: 0.95 (ref 0.8–1.2)
LYMPHOCYTES # BLD AUTO: 3.59 10*3/MM3 (ref 0.7–3.1)
LYMPHOCYTES NFR BLD AUTO: 33.6 % (ref 19.6–45.3)
MCH RBC QN AUTO: 25.8 PG (ref 26.6–33)
MCHC RBC AUTO-ENTMCNC: 31.6 G/DL (ref 31.5–35.7)
MCV RBC AUTO: 81.6 FL (ref 79–97)
MONOCYTES # BLD AUTO: 0.89 10*3/MM3 (ref 0.1–0.9)
MONOCYTES NFR BLD AUTO: 8.3 % (ref 5–12)
NEUTROPHILS NFR BLD AUTO: 5.84 10*3/MM3 (ref 1.7–7)
NEUTROPHILS NFR BLD AUTO: 54.7 % (ref 42.7–76)
NRBC BLD AUTO-RTO: 0 /100 WBC (ref 0–0.2)
PLATELET # BLD AUTO: 460 10*3/MM3 (ref 140–450)
PMV BLD AUTO: 9.4 FL (ref 6–12)
POTASSIUM SERPL-SCNC: 4.2 MMOL/L (ref 3.5–5.2)
PROTHROMBIN TIME: 12.6 SECONDS (ref 11.1–15.3)
RBC # BLD AUTO: 4.34 10*6/MM3 (ref 3.77–5.28)
SARS-COV-2 RNA RESP QL NAA+PROBE: NOT DETECTED
SODIUM SERPL-SCNC: 141 MMOL/L (ref 136–145)
WBC # BLD AUTO: 10.68 10*3/MM3 (ref 3.4–10.8)

## 2021-07-27 PROCEDURE — 85025 COMPLETE CBC W/AUTO DIFF WBC: CPT | Performed by: EMERGENCY MEDICINE

## 2021-07-27 PROCEDURE — 0 IOPAMIDOL PER 1 ML: Performed by: EMERGENCY MEDICINE

## 2021-07-27 PROCEDURE — 87636 SARSCOV2 & INF A&B AMP PRB: CPT | Performed by: EMERGENCY MEDICINE

## 2021-07-27 PROCEDURE — 85379 FIBRIN DEGRADATION QUANT: CPT | Performed by: EMERGENCY MEDICINE

## 2021-07-27 PROCEDURE — 84703 CHORIONIC GONADOTROPIN ASSAY: CPT | Performed by: EMERGENCY MEDICINE

## 2021-07-27 PROCEDURE — 85730 THROMBOPLASTIN TIME PARTIAL: CPT | Performed by: EMERGENCY MEDICINE

## 2021-07-27 PROCEDURE — 96360 HYDRATION IV INFUSION INIT: CPT

## 2021-07-27 PROCEDURE — 80048 BASIC METABOLIC PNL TOTAL CA: CPT | Performed by: EMERGENCY MEDICINE

## 2021-07-27 PROCEDURE — 71275 CT ANGIOGRAPHY CHEST: CPT

## 2021-07-27 PROCEDURE — 99283 EMERGENCY DEPT VISIT LOW MDM: CPT

## 2021-07-27 PROCEDURE — 71046 X-RAY EXAM CHEST 2 VIEWS: CPT

## 2021-07-27 PROCEDURE — 85610 PROTHROMBIN TIME: CPT | Performed by: EMERGENCY MEDICINE

## 2021-07-27 RX ORDER — DEXTROMETHORPHAN HYDROBROMIDE AND PROMETHAZINE HYDROCHLORIDE 15; 6.25 MG/5ML; MG/5ML
5 SYRUP ORAL 4 TIMES DAILY PRN
Qty: 118 ML | Refills: 0 | Status: SHIPPED | OUTPATIENT
Start: 2021-07-27 | End: 2021-08-02

## 2021-07-27 RX ORDER — SODIUM CHLORIDE 0.9 % (FLUSH) 0.9 %
10 SYRINGE (ML) INJECTION AS NEEDED
Status: DISCONTINUED | OUTPATIENT
Start: 2021-07-27 | End: 2021-07-27 | Stop reason: HOSPADM

## 2021-07-27 RX ORDER — AZITHROMYCIN 250 MG/1
TABLET, FILM COATED ORAL
Qty: 6 TABLET | Refills: 0 | Status: SHIPPED | OUTPATIENT
Start: 2021-07-27 | End: 2021-08-02

## 2021-07-27 RX ORDER — CEFUROXIME AXETIL 500 MG/1
500 TABLET ORAL 2 TIMES DAILY
Qty: 20 TABLET | Refills: 0 | Status: SHIPPED | OUTPATIENT
Start: 2021-07-27 | End: 2021-08-06

## 2021-07-27 RX ADMIN — SODIUM CHLORIDE 1000 ML: 900 INJECTION, SOLUTION INTRAVENOUS at 16:29

## 2021-07-27 RX ADMIN — IOPAMIDOL 69 ML: 755 INJECTION, SOLUTION INTRAVENOUS at 17:54

## 2021-07-27 RX ADMIN — Medication 10 ML: at 15:28

## 2021-08-02 ENCOUNTER — CONSULT (OUTPATIENT)
Dept: ONCOLOGY | Facility: CLINIC | Age: 46
End: 2021-08-02

## 2021-08-02 ENCOUNTER — HOSPITAL ENCOUNTER (EMERGENCY)
Facility: HOSPITAL | Age: 46
Discharge: HOME | End: 2021-08-02

## 2021-08-02 ENCOUNTER — LAB (OUTPATIENT)
Dept: ONCOLOGY | Facility: HOSPITAL | Age: 46
End: 2021-08-02

## 2021-08-02 VITALS
HEART RATE: 75 BPM | TEMPERATURE: 98.2 F | HEIGHT: 66 IN | WEIGHT: 293 LBS | DIASTOLIC BLOOD PRESSURE: 103 MMHG | OXYGEN SATURATION: 98 % | RESPIRATION RATE: 18 BRPM | SYSTOLIC BLOOD PRESSURE: 129 MMHG | BODY MASS INDEX: 47.09 KG/M2

## 2021-08-02 VITALS
HEART RATE: 70 BPM | DIASTOLIC BLOOD PRESSURE: 97 MMHG | TEMPERATURE: 97.8 F | HEIGHT: 66 IN | RESPIRATION RATE: 18 BRPM | BODY MASS INDEX: 47.09 KG/M2 | SYSTOLIC BLOOD PRESSURE: 145 MMHG | WEIGHT: 293 LBS

## 2021-08-02 DIAGNOSIS — D75.839 THROMBOCYTOSIS: ICD-10-CM

## 2021-08-02 DIAGNOSIS — E53.8 FOLATE DEFICIENCY: ICD-10-CM

## 2021-08-02 DIAGNOSIS — D64.9 ANEMIA, UNSPECIFIED TYPE: Primary | ICD-10-CM

## 2021-08-02 DIAGNOSIS — D64.9 ANEMIA, UNSPECIFIED TYPE: ICD-10-CM

## 2021-08-02 LAB
BASOPHILS # BLD AUTO: 0.05 10*3/MM3 (ref 0–0.2)
BASOPHILS NFR BLD AUTO: 0.5 % (ref 0–1.5)
DEPRECATED RDW RBC AUTO: 47.2 FL (ref 37–54)
EOSINOPHIL # BLD AUTO: 0.26 10*3/MM3 (ref 0–0.4)
EOSINOPHIL NFR BLD AUTO: 2.5 % (ref 0.3–6.2)
ERYTHROCYTE [DISTWIDTH] IN BLOOD BY AUTOMATED COUNT: 15.9 % (ref 12.3–15.4)
FERRITIN SERPL-MCNC: 118 NG/ML (ref 13–150)
HCT VFR BLD AUTO: 36.7 % (ref 34–46.6)
HGB BLD-MCNC: 11.5 G/DL (ref 12–15.9)
HGB RETIC QN AUTO: 31.8 PG (ref 29.8–36.1)
HOLD SPECIMEN: NORMAL
IMM GRANULOCYTES # BLD AUTO: 0.03 10*3/MM3 (ref 0–0.05)
IMM GRANULOCYTES NFR BLD AUTO: 0.3 % (ref 0–0.5)
IMM RETICS NFR: 15.6 % (ref 3–15.8)
IRON 24H UR-MRATE: 31 MCG/DL (ref 37–145)
IRON SATN MFR SERPL: 9 % (ref 20–50)
LYMPHOCYTES # BLD AUTO: 3.1 10*3/MM3 (ref 0.7–3.1)
LYMPHOCYTES NFR BLD AUTO: 30.3 % (ref 19.6–45.3)
MCH RBC QN AUTO: 25.6 PG (ref 26.6–33)
MCHC RBC AUTO-ENTMCNC: 31.3 G/DL (ref 31.5–35.7)
MCV RBC AUTO: 81.7 FL (ref 79–97)
MONOCYTES # BLD AUTO: 0.78 10*3/MM3 (ref 0.1–0.9)
MONOCYTES NFR BLD AUTO: 7.6 % (ref 5–12)
NEUTROPHILS NFR BLD AUTO: 58.8 % (ref 42.7–76)
NEUTROPHILS NFR BLD AUTO: 6.02 10*3/MM3 (ref 1.7–7)
NRBC BLD AUTO-RTO: 0 /100 WBC (ref 0–0.2)
PLATELET # BLD AUTO: 506 10*3/MM3 (ref 140–450)
PMV BLD AUTO: 9.5 FL (ref 6–12)
RBC # BLD AUTO: 4.49 10*6/MM3 (ref 3.77–5.28)
RETICS # AUTO: 0.07 10*6/MM3 (ref 0.02–0.13)
RETICS/RBC NFR AUTO: 1.46 % (ref 0.7–1.9)
TIBC SERPL-MCNC: 346 MCG/DL (ref 298–536)
TRANSFERRIN SERPL-MCNC: 232 MG/DL (ref 200–360)
WBC # BLD AUTO: 10.24 10*3/MM3 (ref 3.4–10.8)

## 2021-08-02 PROCEDURE — 1125F AMNT PAIN NOTED PAIN PRSNT: CPT | Performed by: INTERNAL MEDICINE

## 2021-08-02 PROCEDURE — G0463 HOSPITAL OUTPT CLINIC VISIT: HCPCS | Performed by: INTERNAL MEDICINE

## 2021-08-02 PROCEDURE — 82607 VITAMIN B-12: CPT | Performed by: INTERNAL MEDICINE

## 2021-08-02 PROCEDURE — 84466 ASSAY OF TRANSFERRIN: CPT | Performed by: INTERNAL MEDICINE

## 2021-08-02 PROCEDURE — 83540 ASSAY OF IRON: CPT | Performed by: INTERNAL MEDICINE

## 2021-08-02 PROCEDURE — 85025 COMPLETE CBC W/AUTO DIFF WBC: CPT | Performed by: INTERNAL MEDICINE

## 2021-08-02 PROCEDURE — 82728 ASSAY OF FERRITIN: CPT | Performed by: INTERNAL MEDICINE

## 2021-08-02 PROCEDURE — 85046 RETICYTE/HGB CONCENTRATE: CPT | Performed by: INTERNAL MEDICINE

## 2021-08-02 PROCEDURE — 99211 OFF/OP EST MAY X REQ PHY/QHP: CPT

## 2021-08-02 PROCEDURE — 99204 OFFICE O/P NEW MOD 45 MIN: CPT | Performed by: INTERNAL MEDICINE

## 2021-08-02 PROCEDURE — G9903 PT SCRN TBCO ID AS NON USER: HCPCS | Performed by: INTERNAL MEDICINE

## 2021-08-02 PROCEDURE — 82746 ASSAY OF FOLIC ACID SERUM: CPT | Performed by: INTERNAL MEDICINE

## 2021-08-02 RX ORDER — BROMPHENIRAMINE MALEATE, PSEUDOEPHEDRINE HYDROCHLORIDE, AND DEXTROMETHORPHAN HYDROBROMIDE 2; 30; 10 MG/5ML; MG/5ML; MG/5ML
SYRUP ORAL
COMMUNITY
Start: 2021-07-20 | End: 2021-08-02

## 2021-08-02 RX ORDER — DOXYCYCLINE 100 MG/1
100 CAPSULE ORAL 2 TIMES DAILY
COMMUNITY
Start: 2021-07-20 | End: 2021-08-18 | Stop reason: HOSPADM

## 2021-08-02 RX ORDER — DOXYCYCLINE HYCLATE 50 MG/1
1 CAPSULE, GELATIN COATED ORAL 2 TIMES DAILY
COMMUNITY
Start: 2021-07-09 | End: 2021-08-02

## 2021-08-02 RX ORDER — DOXYCYCLINE HYCLATE 50 MG/1
324 CAPSULE, GELATIN COATED ORAL
COMMUNITY
Start: 2021-07-09 | End: 2021-10-05 | Stop reason: SINTOL

## 2021-08-02 RX ORDER — ERGOCALCIFEROL 1.25 MG/1
50000 CAPSULE ORAL
COMMUNITY
Start: 2021-07-09 | End: 2022-07-10

## 2021-08-02 RX ORDER — PREDNISONE 20 MG/1
TABLET ORAL
COMMUNITY
Start: 2021-07-28 | End: 2021-08-18 | Stop reason: HOSPADM

## 2021-08-02 RX ORDER — PREDNISONE 20 MG/1
TABLET ORAL
COMMUNITY
Start: 2021-07-28 | End: 2021-08-02

## 2021-08-02 NOTE — ED NOTES
Patient stated she was going to another place because she did not have a seat.     Oneida Perry  08/02/21 1743

## 2021-08-02 NOTE — PROGRESS NOTES
DATE OF CONSULT: 8/3/2021    REQUESTING SOURCE:  Aislinn Acosta, APRN  [address] [place] [address]      REASON FOR CONSULTATION: Anemia, thrombocytosis      HISTORY OF PRESENT ILLNESS:    46-year-old female with medical problem consisting of hypertension, plantar fasciitis, endometrial ablation done in , gastric sleeve surgery done around  has been referred to [facility] for further evaluation and recommendation regarding anemia and thrombocytosis.  Patient states she has been anemic throughout her life and has required iron infusion in the past secondary to inability to tolerate iron by mouth.  Complains of worsening fatigue.  Admits to [facility] for ice.  Complains of right sided of abdominal pain for which she was seen at [place] emergency room recently and has been diagnosed with uterine fibroid.  Denies any visible blood in stool or urine.  Complains of arthritis pain.  Complains of tingling and numbness affecting left thigh as well as upper and lower extremity intermittently.        PAST MEDICAL HISTORY:    Past Medical History:   Diagnosis Date   • Asthma    • Excessive or frequent menstruation    • GERD (gastroesophageal reflux disease)    • History of mammogram 2016   • Hypertension    • Otitis media    • Otorrhea    • Plantar fasciitis        PAST SURGICAL HISTORY:  Past Surgical History:   Procedure Laterality Date   •  SECTION     • CHOLECYSTECTOMY     • GASTRIC SLEEVE LAPAROSCOPIC     • INJECTION OF MEDICATION  2015    Kenalog (1)     • KNEE SURGERY Right    • OTHER SURGICAL HISTORY  2014    Hysteroscope procedure (1)    Exam under anesthesia, diagnostic hysteroscopy with fractional dilation and curettage and NovaSure endometrial ablation.    • TUBAL ABDOMINAL LIGATION         ALLERGIES:    Allergies   Allergen Reactions   • Ibuprofen Anaphylaxis       SOCIAL HISTORY:   Social History     Tobacco Use   • Smoking status: Never Smoker   • Smokeless  tobacco: Never Used   Vaping Use   • Vaping Use: Never used   Substance Use Topics   • Alcohol use: No   • Drug use: No       CURRENT MEDICATIONS:    Current Outpatient Medications   Medication Sig Dispense Refill   • albuterol (PROVENTIL HFA;VENTOLIN HFA) 108 (90 Base) MCG/ACT inhaler Inhale 2 puffs Every 4 (Four) Hours As Needed for Wheezing.     • amLODIPine (NORVASC) 10 MG tablet      • buPROPion XL (WELLBUTRIN XL) 150 MG 24 hr tablet      • butalbital-acetaminophen-caffeine (FIORICET, ESGIC) -40 MG per tablet Take 1 tablet by mouth.     • cefuroxime (CEFTIN) 500 MG tablet Take 1 tablet by mouth 2 (Two) Times a Day for 10 days. 20 tablet 0   • cyanocobalamin (VITAMIN B-12) 1000 MCG tablet Take 1,000 mcg by mouth Daily.     • doxycycline (MONODOX) 100 MG capsule Take 100 mg by mouth 2 (Two) Times a Day.     • ergocalciferol (ERGOCALCIFEROL) 1.25 MG (20325 UT) capsule Take 50,000 Units by mouth.     • gabapentin (NEURONTIN) 600 MG tablet Take 300 mg by mouth 4 (Four) Times a Day.     • irbesartan (AVAPRO) 150 MG tablet Take 150 mg by mouth.     • omeprazole (PrilOSEC) 40 MG capsule Take 1 capsule by mouth Daily. 30 capsule 5   • predniSONE (DELTASONE) 20 MG tablet take 1 tablet TID for 3 days, then 1 tablet BID for 3 days; then 1 tablet daily for 3 days; then 1/2 tablet daily for 3 days.     • ferrous gluconate (FERGON) 324 MG tablet Take 324 mg by mouth.     • hydroCHLOROthiazide (HYDRODIURIL) 12.5 MG tablet      • loratadine (CLARITIN) 10 MG tablet Take 1 tablet by mouth Daily. 30 tablet 0     No current facility-administered medications for this visit.        HOME MEDICATIONS:   Current Outpatient Medications on File Prior to Visit   Medication Sig Dispense Refill   • albuterol (PROVENTIL HFA;VENTOLIN HFA) 108 (90 Base) MCG/ACT inhaler Inhale 2 puffs Every 4 (Four) Hours As Needed for Wheezing.     • amLODIPine (NORVASC) 10 MG tablet      • buPROPion XL (WELLBUTRIN XL) 150 MG 24 hr tablet      •  butalbital-acetaminophen-caffeine (FIORICET, ESGIC) -40 MG per tablet Take 1 tablet by mouth.     • cefuroxime (CEFTIN) 500 MG tablet Take 1 tablet by mouth 2 (Two) Times a Day for 10 days. 20 tablet 0   • cyanocobalamin (VITAMIN B-12) 1000 MCG tablet Take 1,000 mcg by mouth Daily.     • doxycycline (MONODOX) 100 MG capsule Take 100 mg by mouth 2 (Two) Times a Day.     • ergocalciferol (ERGOCALCIFEROL) 1.25 MG (45559 UT) capsule Take 50,000 Units by mouth.     • gabapentin (NEURONTIN) 600 MG tablet Take 300 mg by mouth 4 (Four) Times a Day.     • irbesartan (AVAPRO) 150 MG tablet Take 150 mg by mouth.     • omeprazole (PrilOSEC) 40 MG capsule Take 1 capsule by mouth Daily. 30 capsule 5   • predniSONE (DELTASONE) 20 MG tablet take 1 tablet TID for 3 days, then 1 tablet BID for 3 days; then 1 tablet daily for 3 days; then 1/2 tablet daily for 3 days.     • ferrous gluconate (FERGON) 324 MG tablet Take 324 mg by mouth.     • hydroCHLOROthiazide (HYDRODIURIL) 12.5 MG tablet      • loratadine (CLARITIN) 10 MG tablet Take 1 tablet by mouth Daily. 30 tablet 0     No current facility-administered medications on file prior to visit.       FAMILY HISTORY:    Family History   Problem Relation Age of Onset   • Diabetes Other    • Heart disease Other    • Hypertension Other    • Stroke Other    • Seizures Other    • Heart disease Mother    • Hypertension Mother    • Diabetes Father    • Heart disease Father    • Hypertension Father    • Osteoporosis Father    • Diabetes Maternal Grandmother    • Hypertension Maternal Grandmother    • Diabetes Paternal Grandmother    • Heart disease Paternal Grandmother    • Hypertension Paternal Grandmother    • Osteoporosis Paternal Grandmother        REVIEW OF SYSTEMS:        CONSTITUTIONAL:  Complains of fatigue. Denies any fever, chills or weight loss.     EYES: No visual disturbances. No discharge. No new lesions    ENMT:  No epistaxis, mouth sores or difficulty  "swallowing.    RESPIRATORY:  No new shortness of breath. No new cough or hemoptysis.    CARDIOVASCULAR:  No chest pain or palpitations.    GASTROINTESTINAL: Complains of right-sided abdominal pain for which she was evaluated in emergency room at [facility].  No  nausea, vomiting or blood in the stool.    GENITOURINARY: No Dysuria or Hematuria.    MUSCULOSKELETAL: Positive for arthritis pain.    LYMPHATICS:  Denies any abnormal swollen glands anywhere in the body.    NEUROLOGICAL : Negative for tingling and numbness affecting upper and lower extremity intermittently.  Positive for tingling and numbness affecting left eye.. No headache or dizziness. No seizures or balance problems.    SKIN: No new skin lesions.    ENDOCRINE : No new hot or cold intolerance. No new polyuria . No polydipsia.        PHYSICAL EXAMINATION:      VITAL SIGNS:  /97   Pulse 70   Temp 97.8 °F (36.6 °C)   Resp 18   Ht 167.6 cm (66\")   Wt (!) 138 kg (303 lb 14.4 oz)   BMI 49.05 kg/m²       08/02/21  1515   Weight: (!) 138 kg (303 lb 14.4 oz)       ECOG performance status: 1    CONSTITUTIONAL:  Not in any distress.    EYES: Mild conjunctival Pallor. No Icterus. No Pterygium. Extraocular Movements intact.No ptosis.    ENMT:  Normocephalic, Atraumatic.No Facial Asymmetry noted.    NECK:  No adenopathy.Trachea midline. NO JVD.    RESPIRATORY:  Fair air entry bilateral. No rhonchi or wheezing.Fair respiratory effort.    CARDIOVASCULAR:  S1, S2. Regular rate and rhythm. No murmur or gallop appreciated.    MUSCULOSKELETAL:  No edema.No Calf Tenderness.Decreased range of motion.    NEUROLOGIC:    No  Motor  deficit appreciated. Cranial Nerves 2-12 grossly intact.    SKIN : No new skin lesion identified. Skin is warm and dry to touch.    LYMPHATICS: No new enlarged lymph nodes in neck or supraclavicular area.    PSYCHIATRY: Alert, awake and oriented ×3.Normal affect.  Normal judgment.  Makes good eye contact.          DIAGNOSTIC " DATA:    WBC   Date Value Ref Range Status   08/02/2021 10.24 3.40 - 10.80 10*3/mm3 Final     RBC   Date Value Ref Range Status   08/02/2021 4.49 3.77 - 5.28 10*6/mm3 Final     Hemoglobin   Date Value Ref Range Status   08/02/2021 11.5 (L) 12.0 - 15.9 g/dL Final     Hematocrit   Date Value Ref Range Status   08/02/2021 36.7 34.0 - 46.6 % Final     MCV   Date Value Ref Range Status   08/02/2021 81.7 79.0 - 97.0 fL Final     MCH   Date Value Ref Range Status   08/02/2021 25.6 (L) 26.6 - 33.0 pg Final     MCHC   Date Value Ref Range Status   08/02/2021 31.3 (L) 31.5 - 35.7 g/dL Final     RDW   Date Value Ref Range Status   08/02/2021 15.9 (H) 12.3 - 15.4 % Final     RDW-SD   Date Value Ref Range Status   08/02/2021 47.2 37.0 - 54.0 fl Final     MPV   Date Value Ref Range Status   08/02/2021 9.5 6.0 - 12.0 fL Final     Platelets   Date Value Ref Range Status   08/02/2021 506 (H) 140 - 450 10*3/mm3 Final     Neutrophil %   Date Value Ref Range Status   08/02/2021 58.8 42.7 - 76.0 % Final     Lymphocyte %   Date Value Ref Range Status   08/02/2021 30.3 19.6 - 45.3 % Final     Monocyte %   Date Value Ref Range Status   08/02/2021 7.6 5.0 - 12.0 % Final     Eosinophil %   Date Value Ref Range Status   08/02/2021 2.5 0.3 - 6.2 % Final     Basophil %   Date Value Ref Range Status   08/02/2021 0.5 0.0 - 1.5 % Final     Immature Grans %   Date Value Ref Range Status   08/02/2021 0.3 0.0 - 0.5 % Final     Neutrophils, Absolute   Date Value Ref Range Status   08/02/2021 6.02 1.70 - 7.00 10*3/mm3 Final     Lymphocytes, Absolute   Date Value Ref Range Status   08/02/2021 3.10 0.70 - 3.10 10*3/mm3 Final     Monocytes, Absolute   Date Value Ref Range Status   08/02/2021 0.78 0.10 - 0.90 10*3/mm3 Final     Eosinophils, Absolute   Date Value Ref Range Status   08/02/2021 0.26 0.00 - 0.40 10*3/mm3 Final     Basophils, Absolute   Date Value Ref Range Status   08/02/2021 0.05 0.00 - 0.20 10*3/mm3 Final     Immature Grans, Absolute   Date  Value Ref Range Status   08/02/2021 0.03 0.00 - 0.05 10*3/mm3 Final     nRBC   Date Value Ref Range Status   08/02/2021 0.0 0.0 - 0.2 /100 WBC Final     Glucose   Date Value Ref Range Status   07/27/2021 116 (H) 65 - 99 mg/dL Final     Sodium   Date Value Ref Range Status   07/27/2021 141 136 - 145 mmol/L Final     Potassium   Date Value Ref Range Status   07/27/2021 4.2 3.5 - 5.2 mmol/L Final     CO2   Date Value Ref Range Status   07/27/2021 28.0 22.0 - 29.0 mmol/L Final     Chloride   Date Value Ref Range Status   07/27/2021 106 98 - 107 mmol/L Final     Anion Gap   Date Value Ref Range Status   07/27/2021 7.0 5.0 - 15.0 mmol/L Final     Creatinine   Date Value Ref Range Status   07/27/2021 1.15 (H) 0.57 - 1.00 mg/dL Final     BUN   Date Value Ref Range Status   07/27/2021 17 6 - 20 mg/dL Final     BUN/Creatinine Ratio   Date Value Ref Range Status   07/27/2021 14.8 7.0 - 25.0 Final     Calcium   Date Value Ref Range Status   07/27/2021 8.6 8.6 - 10.5 mg/dL Final     eGFR Non  Amer   Date Value Ref Range Status   07/27/2021 51 (L) >60 mL/min/1.73 Final     Alkaline Phosphatase   Date Value Ref Range Status   07/26/2019 112 39 - 117 U/L Final     Total Protein   Date Value Ref Range Status   07/26/2019 8.5 6.0 - 8.5 g/dL Final     ALT (SGPT)   Date Value Ref Range Status   07/26/2019 18 1 - 33 U/L Final     AST (SGOT)   Date Value Ref Range Status   07/26/2019 21 1 - 32 U/L Final     Total Bilirubin   Date Value Ref Range Status   07/26/2019 0.3 0.2 - 1.2 mg/dL Final     Albumin   Date Value Ref Range Status   07/26/2019 3.80 3.50 - 5.20 g/dL Final     Globulin   Date Value Ref Range Status   07/26/2019 4.7 gm/dL Final     Lab Results   Component Value Date    IRON 31 (L) 08/02/2021    TIBC 346 08/02/2021    LABIRON 9 (L) 08/02/2021    FERRITIN 118.00 08/02/2021    RIFXWFPY30 345 08/02/2021    FOLATE 4.72 (L) 08/02/2021     Lab Results   Component Value Date    HCGQUANT 5.41 (H) 06/01/2018   ]    Radiology  Data :  XR Chest 2 View    Result Date: 7/27/2021  No acute cardiopulmonary process. Electronically signed by:  Cherelle Sung MD  7/27/2021 3:33 PM CDT Workstation: [id number]    CT Angiogram Chest    Result Date: 7/27/2021  No pulmonary embolus. Diffuse left lung and right lower lobe groundglass airspace opacities can be seen with atypical airspace disease/pneumonia. Thyroid goiter with bilateral thyroid nodules measuring up to 3.5 cm on the right and 1.3 cm the left. Recommend nonemergent outpatient thyroid ultrasound for further evaluation. Post surgical changes about the gastroesophageal junction with thickening of the distal esophagus. Consider direct visualization with upper endoscopy. Sequela of prior granulomatous disease. Coronary artery disease. Additional chronic findings as above. Electronically signed by:  Cherelle Sung MD  7/27/2021 6:52 PM CDT Workstation: [id number]      Pathology :  * Cannot find OR log *    ASSESSMENT AND PLAN:      1.  Anemia:  -Recent CBC shows hemoglobin of 11.2 with MCV of 80.  -Differential diagnosis for anemia includes nutritional deficiency like vitamin B12 or folate or iron deficiency versus anemia of chronic inflammation versus bone marrow pathology  -Recommend repeat blood work today consisting of iron studies, ferritin, B12, folate and CBC with reticulocyte count  -Anemia work-up done today shows hemoglobin is 11.5.  Iron studies are consistent with developing iron deficiency with iron saturation of 7%.  Since patient is not able to tolerate iron by mouth, recommend starting intravenous Injectafer starting next week.  Side effect of Injectafer including allergic reaction were discussed with patient.  -Since patient has not had any menstrual bleeding since 2014.  Her iron deficiency is worrisome for GI blood loss.  Recommend gastroenterology evaluation to rule out any GI blood loss.  Referral for gastroenterology team has been placed today  -Folate level  is 4.2 consistent with folate deficiency.  We will start patient on folic acid 1 mg p.o. daily   -B12 level is 328.  Recommend continue with B12 1000 mcg p.o. daily.  -Patient is not able to tolerate iron by mouth secondary to severe stomach upset.  -We will ask patient to return to clinic in 2 months with repeat CBC, iron studies, ferritin, B12 and folate to be done prior to that    2.  Thrombocytosis:  -Most recent platelet count is 460,000  -Elevated platelet count could be reactive secondary to possible atypical pneumonia noted on CT angiogram versus developing iron deficiency.  -CBC done earlier today shows platelet count is 506,000.  Recommend replacing iron deficiency first prior to any work-up work-up for thrombocytosis.      3.  Abdominal pain:  -Patient is planning to go to emergency room after clinic visit today for further evaluation of abdominal pain  -Recently she was seen at emergency room at [facility] and has been diagnosed with uterine fibroid.    4.  History of gastric sleeve surgery    5.  Health maintenance: Patient does not smoke.  Patient is overdue for mammogram and Pap smear.  She was counseled about importance of screening procedures          PHQ-9 Total Score: 0   -Patient is not homicidal or suicidal.  No acute intervention required.      Michelle Lange reports a pain score of 9.  Given her pain assessment as noted, treatment options were discussed and the following options were decided upon as a follow-up plan to address the patient's pain: Patient is going to emergency room for further evaluation for abdominal pain.             Thank you for this consultation.    Bg Segura MD  8/3/2021  07:34 CDT        Part of this note may be an electronic transcription/translation of spoken language to printed text using the Dragon Dictation System.

## 2021-08-03 ENCOUNTER — TELEPHONE (OUTPATIENT)
Dept: ONCOLOGY | Facility: HOSPITAL | Age: 46
End: 2021-08-03

## 2021-08-03 PROBLEM — T45.4X5A ADVERSE EFFECT OF IRON: Status: ACTIVE | Noted: 2021-08-03

## 2021-08-03 LAB
FOLATE SERPL-MCNC: 4.72 NG/ML (ref 4.78–24.2)
VIT B12 BLD-MCNC: 345 PG/ML (ref 211–946)

## 2021-08-03 RX ORDER — FOLIC ACID 1 MG/1
1 TABLET ORAL DAILY
Qty: 90 TABLET | Refills: 1 | Status: SHIPPED | OUTPATIENT
Start: 2021-08-03 | End: 2022-01-19 | Stop reason: SDUPTHER

## 2021-08-03 RX ORDER — PROCHLORPERAZINE MALEATE 10 MG
10 TABLET ORAL ONCE
Status: CANCELLED | OUTPATIENT
Start: 2021-08-13 | End: 2021-08-09

## 2021-08-03 RX ORDER — DIPHENHYDRAMINE HYDROCHLORIDE 50 MG/ML
50 INJECTION INTRAMUSCULAR; INTRAVENOUS AS NEEDED
Status: CANCELLED | OUTPATIENT
Start: 2021-08-09

## 2021-08-03 RX ORDER — DIPHENHYDRAMINE HCL 25 MG
25 CAPSULE ORAL ONCE
Status: CANCELLED | OUTPATIENT
Start: 2021-08-13

## 2021-08-03 RX ORDER — CETIRIZINE HYDROCHLORIDE 10 MG/1
10 TABLET ORAL ONCE
Status: CANCELLED | OUTPATIENT
Start: 2021-08-13 | End: 2021-08-09

## 2021-08-03 RX ORDER — SODIUM CHLORIDE 9 MG/ML
250 INJECTION, SOLUTION INTRAVENOUS ONCE
Status: CANCELLED | OUTPATIENT
Start: 2021-08-13 | End: 2021-08-09

## 2021-08-03 RX ORDER — ACETAMINOPHEN 325 MG/1
650 TABLET ORAL ONCE
Status: CANCELLED | OUTPATIENT
Start: 2021-08-13

## 2021-08-03 NOTE — TELEPHONE ENCOUNTER
----- Message from Bg Segura MD sent at 8/3/2021  7:44 AM CDT -----  Please let patient know , her iron level is low.  I have ordered intravenous Injectafer to be started next week.  Her folate level is coming back low in blood.  She needs to start taking folic acid p.o. daily.  She needs to continue taking B12 p.o. daily.  I have sent prescription for B12 and folic acid to her pharmacy.  Her hemoglobin earlier today is 11.5.  Platelet count is elevated at 506,000.  Recommend replacing iron first prior to any further work-up for elevated platelet.  I have also placed referral for gastroenterology team for evaluation of iron deficiency.  Thank you

## 2021-08-18 ENCOUNTER — TELEPHONE (OUTPATIENT)
Dept: OBSTETRICS AND GYNECOLOGY | Facility: CLINIC | Age: 46
End: 2021-08-18

## 2021-08-18 ENCOUNTER — OFFICE VISIT (OUTPATIENT)
Dept: OBSTETRICS AND GYNECOLOGY | Facility: CLINIC | Age: 46
End: 2021-08-18

## 2021-08-18 VITALS
WEIGHT: 293 LBS | HEIGHT: 66 IN | SYSTOLIC BLOOD PRESSURE: 132 MMHG | DIASTOLIC BLOOD PRESSURE: 76 MMHG | BODY MASS INDEX: 47.09 KG/M2

## 2021-08-18 DIAGNOSIS — R10.2 PELVIC PAIN: Primary | ICD-10-CM

## 2021-08-18 DIAGNOSIS — Z12.4 ENCOUNTER FOR PAPANICOLAOU SMEAR FOR CERVICAL CANCER SCREENING: ICD-10-CM

## 2021-08-18 PROCEDURE — 99213 OFFICE O/P EST LOW 20 MIN: CPT | Performed by: NURSE PRACTITIONER

## 2021-08-18 NOTE — TELEPHONE ENCOUNTER
Received pelvic ultrasound from [facility]-   Impression  - 1.9cm uterine fibroid  - Cystic 1 cm area within the uterus could represent a degenerative fibroid  -normal endometrial stripe  - right ovary not visualized for evaluation      Per Dr. Younger, a degenerative fibroid pain can be painful. Discussed this with patient and reviewed pelvic US findings.  Instructed pt  to keep appt with Dr. Younger, Pt v/u.

## 2021-08-18 NOTE — PROGRESS NOTES
"Subjective   Michelle Lange is a 46 y.o. here for right sided pelvic pain, diagnosed with uterine fibroid    Michelle Lange is a 46 yr old  female with history of endometrial ablation and tubal ligation presents today for right sided pelvic pain for 2.5 weeks. Was recently diagnosed with a uterine fibroid. Patient had gone to the [facility] ER for abdominal pain 2.5 weeks ago thinking it was her appendix; had a CT scan and was told she had a uterine fibroid. Patient was instructed  she needed a pelvic ultrasound but this could not be done at [facility]. Pt went to [facility] ER 2 days later and had a pelvic ultrasound that showed multple fibroids. We are awaiting records from [facility]; patient signed a records release for the CT scan from [facility]. Pt reports the pain is mainly on the right, it is constant and sometimes stabbing in nature. It is over her \"belly flap\", radiates to her groin and the right leg. Sometimes she \"doubles over\" due to the pain.  She is unsure if her ovaries were seen with the pelvic ultrasound. Has been taking tylenol and tramadol, experiencing some relief. Unsure of last pap.     Pelvic Pain  The patient's primary symptoms include pelvic pain. The patient's pertinent negatives include no vaginal discharge. Associated symptoms include abdominal pain, back pain, constipation, diarrhea and nausea. Pertinent negatives include no chills, dysuria, fever, frequency, rash or sore throat.       The following portions of the patient's history were reviewed and updated as appropriate: allergies, current medications, past family history, past medical history, past social history, past surgical history and problem list.    Review of Systems   Constitutional: Positive for fatigue. Negative for chills, fever, unexpected weight gain and unexpected weight loss.   HENT: Negative for sneezing and sore throat.    Respiratory: Negative for shortness of breath.  "   Cardiovascular: Negative for chest pain and palpitations.   Gastrointestinal: Positive for abdominal pain, constipation, diarrhea and nausea.   Endocrine: Negative for cold intolerance and heat intolerance.   Genitourinary: Positive for pelvic pain and urinary incontinence. Negative for amenorrhea, breast discharge, breast lump, breast pain, difficulty urinating, dysuria, frequency, menstrual problem, pelvic pressure, vaginal bleeding, vaginal discharge and vaginal pain.   Musculoskeletal: Positive for back pain.   Skin: Negative for rash.   Neurological: Negative for weakness and headache.   Psychiatric/Behavioral: Negative for sleep disturbance, depressed mood and stress.       Objective   Physical Exam  Vitals and nursing note reviewed. Exam conducted with a chaperone present.   Constitutional:       Appearance: She is well-developed.   HENT:      Head: Normocephalic and atraumatic.   Eyes:      Conjunctiva/sclera: Conjunctivae normal.   Neck:      Thyroid: No thyromegaly.   Cardiovascular:      Rate and Rhythm: Normal rate and regular rhythm.      Heart sounds: Normal heart sounds.   Pulmonary:      Effort: Pulmonary effort is normal. No respiratory distress.      Breath sounds: Normal breath sounds.   Abdominal:      General: Bowel sounds are normal. There is no distension.      Palpations: Abdomen is soft.      Tenderness: There is no abdominal tenderness.   Genitourinary:     General: Normal vulva.      Labia:         Right: No rash, tenderness, lesion or injury.         Left: No rash, tenderness, lesion or injury.       Vagina: Normal.      Cervix: Normal.      Uterus: Tender. Not deviated, not enlarged, not fixed and no uterine prolapse.       Adnexa:         Right: Tenderness present. No mass or fullness.          Left: No mass, tenderness or fullness.        Rectum: Normal.      Comments: Pap cotesting collected.   Musculoskeletal:         General: Normal range of motion.      Cervical back: Normal  range of motion and neck supple.   Skin:     General: Skin is warm and dry.   Neurological:      Mental Status: She is alert and oriented to person, place, and time.   Psychiatric:         Behavior: Behavior normal.           Assessment/Plan   Diagnoses and all orders for this visit:    1. Pelvic pain (Primary)    2. Encounter for Papanicolaou smear for cervical cancer screening  -     Cancel: Liquid-based Pap Smear, Screening; Future  -     Liquid-based Pap Smear, Screening; Future  -     Liquid-based Pap Smear, Screening      Counseled patient  need for records to review to develop plan of care. Discussed that if the uterine fibroid is the cause of her pelvic pain, a surgery consult is needed due to patient's history of endometrial ablation. Discussed proceeding with collecting a pap smear today. Due to tenderness during bimanual exam, will add G/C to pap smear, although patient reports a low risk for STIs-, sexual partner-spouse. Will have patient have a follow-up with one of our doctors. Provided return precautions for acute pelvic pain.       Received pelvic ultrasound report after completion of patient visit. Pelvic US demonstrates a   - 1.9cm uterine fibroid  - Cystic 1 cm area within the uterus could represent a degenerative fibroid  - normal endometrial stripe  - right ovary not visualized for evaluation      Per Dr. Younger, a degenerative fibroid can be painful .Reviewed these findings over the phone with patient. Instructed to keep follow-up appt with Dr. Younger.

## 2021-08-19 ENCOUNTER — TELEPHONE (OUTPATIENT)
Dept: OBSTETRICS AND GYNECOLOGY | Facility: CLINIC | Age: 46
End: 2021-08-19

## 2021-08-19 NOTE — TELEPHONE ENCOUNTER
Josy from pathology called and said they need the orders for the patient lab work   The GC and Chlamydia

## 2021-08-23 ENCOUNTER — OFFICE VISIT (OUTPATIENT)
Dept: OBSTETRICS AND GYNECOLOGY | Facility: CLINIC | Age: 46
End: 2021-08-23

## 2021-08-23 ENCOUNTER — LAB (OUTPATIENT)
Dept: LAB | Facility: HOSPITAL | Age: 46
End: 2021-08-23

## 2021-08-23 VITALS
DIASTOLIC BLOOD PRESSURE: 82 MMHG | WEIGHT: 293 LBS | HEIGHT: 66 IN | BODY MASS INDEX: 47.09 KG/M2 | SYSTOLIC BLOOD PRESSURE: 122 MMHG

## 2021-08-23 DIAGNOSIS — D21.9 FIBROID TUMOR: ICD-10-CM

## 2021-08-23 DIAGNOSIS — R10.2 PELVIC PAIN: Primary | ICD-10-CM

## 2021-08-23 DIAGNOSIS — R10.2 PELVIC PAIN: ICD-10-CM

## 2021-08-23 PROCEDURE — 80053 COMPREHEN METABOLIC PANEL: CPT

## 2021-08-23 PROCEDURE — 86140 C-REACTIVE PROTEIN: CPT

## 2021-08-23 PROCEDURE — 99214 OFFICE O/P EST MOD 30 MIN: CPT | Performed by: OBSTETRICS & GYNECOLOGY

## 2021-08-23 PROCEDURE — 85025 COMPLETE CBC W/AUTO DIFF WBC: CPT | Performed by: OBSTETRICS & GYNECOLOGY

## 2021-08-23 PROCEDURE — 36415 COLL VENOUS BLD VENIPUNCTURE: CPT | Performed by: OBSTETRICS & GYNECOLOGY

## 2021-08-23 NOTE — PROGRESS NOTES
Michelle Lange is a 46 y.o. y/o female.     Chief Complaint: Pelvic pain worse on the right ultrasound Carleen oYu possible degenerating fibroid    HPI:   46 y.o. No obstetric history on file..  No LMP recorded. Patient has had an ablation..  Patient has had about 3 to 4 weeks history of pelvic pain worse on the right.  Visiting relatives in [facility] was seen at [facility] CT scan showed fibroid.  Had an ultrasound Carleen You showed a fibroid with some cavity and it suggested degeneration.  Evaluated by Kia I reviewed situation with her.  The pain is not improving over time.  Pain is described as fairly high on the right outside false pelvis.  We had a copy of the report from Carleen You but I think it is being scanned DN          Review of Systems     Constitutional: Denies night sweats    HENT: No hearing changes, denies ear pain    Eye: No eye pain; no foreign body in eye    Pulmonary: No hemoptysis    Cardiovascular: No claudication    GI: No hematemesis    Musculoskeletal: No arthralgias, no joint swelling    Endocrine: No polydipsia or polyuria    Hematologic: Denies any free bleeding    Psychiatric: Denies any delusions    The following portions of the patient's history were reviewed and updated as appropriate: allergies, current medications, past family history, past medical history, past social history, past surgical history and problem list.    Allergies   Allergen Reactions   • Ibuprofen Anaphylaxis   • Phenergan [Promethazine] Other (See Comments)     Pt states arms and legs jump uncontrollably         Outpatient Medications Prior to Visit   Medication Sig Dispense Refill   • albuterol (PROVENTIL HFA;VENTOLIN HFA) 108 (90 Base) MCG/ACT inhaler Inhale 2 puffs Every 4 (Four) Hours As Needed for Wheezing.     • amLODIPine (NORVASC) 10 MG tablet      • buPROPion XL (WELLBUTRIN XL) 150 MG 24 hr tablet      • butalbital-acetaminophen-caffeine (FIORICET, ESGIC) -40 MG  per tablet Take 1 tablet by mouth.     • cyanocobalamin (VITAMIN B-12) 1000 MCG tablet Take 1 tablet by mouth Daily. 90 tablet 1   • ergocalciferol (ERGOCALCIFEROL) 1.25 MG (43060 UT) capsule Take 50,000 Units by mouth.     • ferrous gluconate (FERGON) 324 MG tablet Take 324 mg by mouth.     • folic acid (FOLVITE) 1 MG tablet Take 1 tablet by mouth Daily. 90 tablet 1   • gabapentin (NEURONTIN) 600 MG tablet Take 300 mg by mouth 4 (Four) Times a Day.     • hydroCHLOROthiazide (HYDRODIURIL) 12.5 MG tablet      • irbesartan (AVAPRO) 150 MG tablet Take 150 mg by mouth.     • omeprazole (PrilOSEC) 40 MG capsule Take 1 capsule by mouth Daily. 30 capsule 5     No facility-administered medications prior to visit.        The patient has a family history of   Family History   Problem Relation Age of Onset   • Diabetes Other    • Heart disease Other    • Hypertension Other    • Stroke Other    • Seizures Other    • Heart disease Mother    • Hypertension Mother    • Diabetes Father    • Heart disease Father    • Hypertension Father    • Osteoporosis Father    • Diabetes Maternal Grandmother    • Hypertension Maternal Grandmother    • Diabetes Paternal Grandmother    • Heart disease Paternal Grandmother    • Hypertension Paternal Grandmother    • Osteoporosis Paternal Grandmother         Past Medical History:   Diagnosis Date   • Asthma    • Excessive or frequent menstruation    • GERD (gastroesophageal reflux disease)    • History of mammogram 02/24/2016   • Hypertension    • Otitis media    • Otorrhea    • Plantar fasciitis         OB History    No obstetric history on file.          Social History     Socioeconomic History   • Marital status:      Spouse name: Not on file   • Number of children: Not on file   • Years of education: Not on file   • Highest education level: Not on file   Tobacco Use   • Smoking status: Never Smoker   • Smokeless tobacco: Never Used   Vaping Use   • Vaping Use: Never used   Substance and  "Sexual Activity   • Alcohol use: No   • Drug use: No   • Sexual activity: Defer        Past Surgical History:   Procedure Laterality Date   •  SECTION     • CHOLECYSTECTOMY     • GASTRIC SLEEVE LAPAROSCOPIC     • INJECTION OF MEDICATION  2015    Kenalog (1)     • KNEE SURGERY Right    • OTHER SURGICAL HISTORY  2014    Hysteroscope procedure (1)    Exam under anesthesia, diagnostic hysteroscopy with fractional dilation and curettage and NovaSure endometrial ablation.    • TUBAL ABDOMINAL LIGATION          Patient Active Problem List   Diagnosis   • Acute pain of both knees   • Primary osteoarthritis of both knees   • Chondromalacia of both patellae   • Constipation   • Anemia   • Thrombocytosis (CMS/HCC)   • Adverse effect of iron        Documented Vitals    21 1514   BP: 122/82   Weight: (!) 141 kg (309 lb 12.8 oz)   Height: 167.6 cm (66\")   PainSc:   5        Body mass index is 50 kg/m².    Physical Exam  Constitutional: Appears to be in no acute distress; Eyes: Sclerae normal; Endocrine system: Thyroid palpation is normal; Pulmonary system: Lungs clear; Cardiovascular system: Heart regular rate and rhythm; Gastrointestinal system: Abdomen soft and nontender, active bowel sounds; Urologic system: CVA negative; Psychiatric: Appropriate insight; Neurologic: Gait within normal limits female genital system external genitalia normal vagina normal bimanual examination there really is a lot of cervical motion tenderness or mid uterine manipulation tenderness the patient's uterus is hard to outline on bimanual examination secondary to habitus    Laboratory Data:   Lab Results - Last 18 Months   Lab Units 21  1531   GLUCOSE mg/dL 116*   BUN mg/dL 17   CREATININE mg/dL 1.15*   SODIUM mmol/L 141   POTASSIUM mmol/L 4.2   CHLORIDE mmol/L 106   CO2 mmol/L 28.0   CALCIUM mg/dL 8.6   EGFR IF NONAFRICN AM mL/min/1.73 51*   BUN / CREAT RATIO  14.8   ANION GAP mmol/L 7.0     Lab Results - Last  " Months   Lab Units 08/02/21  1541 07/27/21  1532   WBC 10*3/mm3 10.24 10.68   RBC 10*6/mm3 4.49 4.34   HEMOGLOBIN g/dL 11.5* 11.2*   HEMATOCRIT % 36.7 35.4   MCV fL 81.7 81.6   MCH pg 25.6* 25.8*   MCHC g/dL 31.3* 31.6   RDW % 15.9* 15.9*   RDW-SD fl 47.2 47.5   MPV fL 9.5 9.4   PLATELETS 10*3/mm3 506* 460*     Lab Results - Last 18 Months   Lab Units 07/27/21  1721   HCG QUALITATIVE  Negative       Assessment        Diagnosis Plan   1. Pelvic pain  Comprehensive Metabolic Panel    CBC Auto Differential    C-reactive Protein    MRI Pelvis With & Without Contrast   2. Fibroid tumor  Comprehensive Metabolic Panel    CBC Auto Differential    C-reactive Protein    MRI Pelvis With & Without Contrast       It is possible that this pain could be from a degenerating fibroid she has had an ablation post ablation syndrome would be possible though acute onset makes this less likely.  I think the best study to obtain at this point would be basic laboratory studies and a CRP to look for inflammation and an MRI with contrast if her creatinine is not significantly elevated  Plan       No orders of the defined types were placed in this encounter.            This document has been electronically signed by Johnathan Younger MD on August 23, 2021 18:10 CDT    Please note that portions of this note were completed with a voice recognition program.

## 2021-08-24 LAB
ALBUMIN SERPL-MCNC: 3.9 G/DL (ref 3.5–5.2)
ALBUMIN/GLOB SERPL: 1.1 G/DL
ALP SERPL-CCNC: 88 U/L (ref 39–117)
ALT SERPL W P-5'-P-CCNC: 14 U/L (ref 1–33)
ANION GAP SERPL CALCULATED.3IONS-SCNC: 10 MMOL/L (ref 5–15)
AST SERPL-CCNC: 16 U/L (ref 1–32)
BASOPHILS # BLD AUTO: 0.06 10*3/MM3 (ref 0–0.2)
BASOPHILS NFR BLD AUTO: 0.6 % (ref 0–1.5)
BILIRUB SERPL-MCNC: 0.2 MG/DL (ref 0–1.2)
BUN SERPL-MCNC: 15 MG/DL (ref 6–20)
BUN/CREAT SERPL: 13.3 (ref 7–25)
CALCIUM SPEC-SCNC: 9.1 MG/DL (ref 8.6–10.5)
CHLORIDE SERPL-SCNC: 106 MMOL/L (ref 98–107)
CO2 SERPL-SCNC: 25 MMOL/L (ref 22–29)
CREAT SERPL-MCNC: 1.13 MG/DL (ref 0.57–1)
CRP SERPL-MCNC: 1.86 MG/DL (ref 0–0.5)
DEPRECATED RDW RBC AUTO: 49.2 FL (ref 37–54)
EOSINOPHIL # BLD AUTO: 0.21 10*3/MM3 (ref 0–0.4)
EOSINOPHIL NFR BLD AUTO: 2.3 % (ref 0.3–6.2)
ERYTHROCYTE [DISTWIDTH] IN BLOOD BY AUTOMATED COUNT: 16.3 % (ref 12.3–15.4)
GFR SERPL CREATININE-BSD FRML MDRD: 52 ML/MIN/1.73
GLOBULIN UR ELPH-MCNC: 3.5 GM/DL
GLUCOSE SERPL-MCNC: 103 MG/DL (ref 65–99)
HCT VFR BLD AUTO: 36.2 % (ref 34–46.6)
HGB BLD-MCNC: 11.2 G/DL (ref 12–15.9)
IMM GRANULOCYTES # BLD AUTO: 0.02 10*3/MM3 (ref 0–0.05)
IMM GRANULOCYTES NFR BLD AUTO: 0.2 % (ref 0–0.5)
LYMPHOCYTES # BLD AUTO: 3.09 10*3/MM3 (ref 0.7–3.1)
LYMPHOCYTES NFR BLD AUTO: 33.3 % (ref 19.6–45.3)
MCH RBC QN AUTO: 25.9 PG (ref 26.6–33)
MCHC RBC AUTO-ENTMCNC: 30.9 G/DL (ref 31.5–35.7)
MCV RBC AUTO: 83.6 FL (ref 79–97)
MONOCYTES # BLD AUTO: 0.73 10*3/MM3 (ref 0.1–0.9)
MONOCYTES NFR BLD AUTO: 7.9 % (ref 5–12)
NEUTROPHILS NFR BLD AUTO: 5.17 10*3/MM3 (ref 1.7–7)
NEUTROPHILS NFR BLD AUTO: 55.7 % (ref 42.7–76)
NRBC BLD AUTO-RTO: 0 /100 WBC (ref 0–0.2)
PLATELET # BLD AUTO: 433 10*3/MM3 (ref 140–450)
PMV BLD AUTO: 10.3 FL (ref 6–12)
POTASSIUM SERPL-SCNC: 4.2 MMOL/L (ref 3.5–5.2)
PROT SERPL-MCNC: 7.4 G/DL (ref 6–8.5)
RBC # BLD AUTO: 4.33 10*6/MM3 (ref 3.77–5.28)
SODIUM SERPL-SCNC: 141 MMOL/L (ref 136–145)
WBC # BLD AUTO: 9.28 10*3/MM3 (ref 3.4–10.8)

## 2021-08-25 ENCOUNTER — OFFICE VISIT (OUTPATIENT)
Dept: GASTROENTEROLOGY | Facility: CLINIC | Age: 46
End: 2021-08-25

## 2021-08-25 VITALS
HEIGHT: 66 IN | WEIGHT: 293 LBS | HEART RATE: 81 BPM | SYSTOLIC BLOOD PRESSURE: 146 MMHG | DIASTOLIC BLOOD PRESSURE: 91 MMHG | BODY MASS INDEX: 47.09 KG/M2

## 2021-08-25 DIAGNOSIS — D50.0 IRON DEFICIENCY ANEMIA DUE TO CHRONIC BLOOD LOSS: Primary | ICD-10-CM

## 2021-08-25 DIAGNOSIS — R10.84 GENERALIZED ABDOMINAL PAIN: ICD-10-CM

## 2021-08-25 PROBLEM — R10.30 LOWER ABDOMINAL PAIN: Status: ACTIVE | Noted: 2021-08-25

## 2021-08-25 PROCEDURE — 99213 OFFICE O/P EST LOW 20 MIN: CPT | Performed by: NURSE PRACTITIONER

## 2021-08-25 RX ORDER — DEXTROSE AND SODIUM CHLORIDE 5; .45 G/100ML; G/100ML
30 INJECTION, SOLUTION INTRAVENOUS CONTINUOUS PRN
Status: CANCELLED | OUTPATIENT
Start: 2021-09-28

## 2021-08-25 RX ORDER — POLYETHYLENE GLYCOL 3350, SODIUM SULFATE, SODIUM CHLORIDE, POTASSIUM CHLORIDE, ASCORBIC ACID, SODIUM ASCORBATE 7.5-2.691G
1000 KIT ORAL EVERY 12 HOURS
Qty: 1000 ML | Refills: 0 | Status: SHIPPED | OUTPATIENT
Start: 2021-08-25 | End: 2021-08-25

## 2021-08-25 RX ORDER — SODIUM, POTASSIUM,MAG SULFATES 17.5-3.13G
1 SOLUTION, RECONSTITUTED, ORAL ORAL EVERY 12 HOURS
Qty: 354 ML | Refills: 0 | Status: SHIPPED | OUTPATIENT
Start: 2021-08-25 | End: 2021-10-05

## 2021-08-25 NOTE — PROGRESS NOTES
Chief Complaint   Patient presents with   • Anemia       Subjective    Michelle Lange is a 46 y.o. female. she is here today for follow-up.    46-year-old female presents to discuss symptomatic anemia.  States she has had symptoms over the years but has not required iron infusions since she had ablation in  she has history of bariatric surgery in 2017.  She also reports some severe pelvic pain for which she is following with Dr. Younger.  She reports issues with constipation but denies any visible blood in her stool.    Anemia  Presents for initial visit. Symptoms include abdominal pain (lower cramping ), bruises/bleeds easily, malaise/fatigue, pallor, pica and weight loss. There has been no anorexia, confusion, fever, leg swelling, light-headedness, palpitations or paresthesias. Signs of blood loss that are not present include hematemesis, hematochezia, melena, menorrhagia and vaginal bleeding. Past treatments include parenteral iron supplements.     Plan; schedule patient for EGD and colonoscopy due to generalized abdominal pain on exam symptomatic anemia to rule out  GI blood loss as cause of anemia.       The following portions of the patient's history were reviewed and updated as appropriate:   Past Medical History:   Diagnosis Date   • Asthma    • Excessive or frequent menstruation    • GERD (gastroesophageal reflux disease)    • History of mammogram 2016   • Hypertension    • Otitis media    • Otorrhea    • Plantar fasciitis      Past Surgical History:   Procedure Laterality Date   •  SECTION     • CHOLECYSTECTOMY     • GASTRIC SLEEVE LAPAROSCOPIC     • INJECTION OF MEDICATION  2015    Kenalog (1)     • KNEE SURGERY Right    • OTHER SURGICAL HISTORY  2014    Hysteroscope procedure (1)    Exam under anesthesia, diagnostic hysteroscopy with fractional dilation and curettage and NovaSure endometrial ablation.    • TUBAL ABDOMINAL LIGATION       Family History   Problem Relation  Age of Onset   • Diabetes Other    • Heart disease Other    • Hypertension Other    • Stroke Other    • Seizures Other    • Heart disease Mother    • Hypertension Mother    • Diabetes Father    • Heart disease Father    • Hypertension Father    • Osteoporosis Father    • Diabetes Maternal Grandmother    • Hypertension Maternal Grandmother    • Diabetes Paternal Grandmother    • Heart disease Paternal Grandmother    • Hypertension Paternal Grandmother    • Osteoporosis Paternal Grandmother      OB History    No obstetric history on file.       Prior to Admission medications    Medication Sig Start Date End Date Taking? Authorizing Provider   albuterol (PROVENTIL HFA;VENTOLIN HFA) 108 (90 Base) MCG/ACT inhaler Inhale 2 puffs Every 4 (Four) Hours As Needed for Wheezing.   Yes Emergency, Nurse CHRISTIE Ge   amLODIPine (NORVASC) 10 MG tablet  4/13/21  Yes ProviderWeston MD   buPROPion XL (WELLBUTRIN XL) 150 MG 24 hr tablet  5/12/21  Yes Provider, MD Weston   butalbital-acetaminophen-caffeine (FIORICET, ESGIC) -40 MG per tablet Take 1 tablet by mouth. 7/10/20  Yes Emergency, Nurse Luma RN   cyanocobalamin (VITAMIN B-12) 1000 MCG tablet Take 1 tablet by mouth Daily. 8/3/21  Yes Bg Segura MD   ergocalciferol (ERGOCALCIFEROL) 1.25 MG (92463 UT) capsule Take 50,000 Units by mouth. 7/9/21 7/10/22 Yes Provider, MD Weston   ferrous gluconate (FERGON) 324 MG tablet Take 324 mg by mouth. 7/9/21 7/10/22 Yes Provider, MD Weston   folic acid (FOLVITE) 1 MG tablet Take 1 tablet by mouth Daily. 8/3/21  Yes Bg Segura MD   gabapentin (NEURONTIN) 600 MG tablet Take 300 mg by mouth 4 (Four) Times a Day. 7/10/20  Yes Emergency, Nurse Luma RN   hydroCHLOROthiazide (HYDRODIURIL) 12.5 MG tablet  4/13/21  Yes ProviderWeston MD   irbesartan (AVAPRO) 150 MG tablet Take 150 mg by mouth. 7/10/20  Yes Emergency, Nurse Epic, RN   omeprazole (PrilOSEC) 40 MG capsule Take 1 capsule by mouth Daily. 3/26/20   "Yes Prem Aldridge MD     Allergies   Allergen Reactions   • Ibuprofen Anaphylaxis   • Phenergan [Promethazine] Other (See Comments)     Pt states arms and legs jump uncontrollably      Social History     Socioeconomic History   • Marital status:      Spouse name: Not on file   • Number of children: Not on file   • Years of education: Not on file   • Highest education level: Not on file   Tobacco Use   • Smoking status: Never Smoker   • Smokeless tobacco: Never Used   Vaping Use   • Vaping Use: Never used   Substance and Sexual Activity   • Alcohol use: No   • Drug use: No   • Sexual activity: Defer       Review of Systems  Review of Systems   Constitutional: Positive for fatigue, malaise/fatigue and weight loss. Negative for activity change, appetite change, chills, diaphoresis, fever and unexpected weight change.   HENT: Negative for sore throat and trouble swallowing.    Respiratory: Negative for shortness of breath.    Cardiovascular: Negative for palpitations.   Gastrointestinal: Positive for abdominal pain (lower cramping ) and constipation. Negative for abdominal distention, anal bleeding, anorexia, blood in stool, diarrhea, hematemesis, hematochezia, melena, nausea, rectal pain and vomiting.   Genitourinary: Negative for menorrhagia and vaginal bleeding.   Musculoskeletal: Negative for arthralgias.   Skin: Positive for pallor.   Neurological: Positive for weakness. Negative for light-headedness and paresthesias.   Hematological: Bruises/bleeds easily.   Psychiatric/Behavioral: Negative for confusion.        /91 (BP Location: Left arm)   Pulse 81   Ht 167.6 cm (66\")   Wt (!) 139 kg (306 lb)   BMI 49.39 kg/m²     Objective    Physical Exam  Constitutional:       General: She is not in acute distress.     Appearance: Normal appearance. She is normal weight.   HENT:      Head: Normocephalic and atraumatic.   Pulmonary:      Effort: Pulmonary effort is normal.   Abdominal:      General: Bowel " sounds are normal. There is no distension.      Palpations: Abdomen is soft.      Tenderness: There is generalized abdominal tenderness (diffusely tender most on right side ) and tenderness in the right upper quadrant and right lower quadrant. There is no guarding.       Lab on 08/23/2021   Component Date Value Ref Range Status   • Glucose 08/23/2021 103* 65 - 99 mg/dL Final   • BUN 08/23/2021 15  6 - 20 mg/dL Final   • Creatinine 08/23/2021 1.13* 0.57 - 1.00 mg/dL Final   • Sodium 08/23/2021 141  136 - 145 mmol/L Final   • Potassium 08/23/2021 4.2  3.5 - 5.2 mmol/L Final   • Chloride 08/23/2021 106  98 - 107 mmol/L Final   • CO2 08/23/2021 25.0  22.0 - 29.0 mmol/L Final   • Calcium 08/23/2021 9.1  8.6 - 10.5 mg/dL Final   • Total Protein 08/23/2021 7.4  6.0 - 8.5 g/dL Final   • Albumin 08/23/2021 3.90  3.50 - 5.20 g/dL Final   • ALT (SGPT) 08/23/2021 14  1 - 33 U/L Final   • AST (SGOT) 08/23/2021 16  1 - 32 U/L Final   • Alkaline Phosphatase 08/23/2021 88  39 - 117 U/L Final   • Total Bilirubin 08/23/2021 0.2  0.0 - 1.2 mg/dL Final   • eGFR Non African Amer 08/23/2021 52* >60 mL/min/1.73 Final   • Globulin 08/23/2021 3.5  gm/dL Final   • A/G Ratio 08/23/2021 1.1  g/dL Final   • BUN/Creatinine Ratio 08/23/2021 13.3  7.0 - 25.0 Final   • Anion Gap 08/23/2021 10.0  5.0 - 15.0 mmol/L Final   • C-Reactive Protein 08/23/2021 1.86* 0.00 - 0.50 mg/dL Final     Assessment/Plan      1. Iron deficiency anemia due to chronic blood loss    2. Generalized abdominal pain    .       Orders placed during this encounter include:  Orders Placed This Encounter   Procedures   • Follow Anesthesia Guidelines / Standing Orders     Standing Status:   Future   • Obtain Informed Consent     Standing Status:   Future     Order Specific Question:   Informed Consent Given For     Answer:   egd and colonoscopy       ESOPHAGOGASTRODUODENOSCOPY (N/A), COLONOSCOPY (N/A)    Review and/or summary of lab tests, radiology, procedures, medications.  Review and summary of old records and obtaining of history. The risks and benefits of my recommendations, as well as other treatment options were discussed with the patient today. Questions were answered.    New Medications Ordered This Visit   Medications   • MoviPrep 100 g reconstituted solution powder     Sig: Take 1,000 mL by mouth Every 12 (Twelve) Hours.     Dispense:  1000 mL     Refill:  0       Follow-up: Return in about 4 weeks (around 9/22/2021) for Recheck, After test.          This document has been electronically signed by PORTIA Montelongo on August 25, 2021 15:49 CDT           I spent 15 minutes caring for Michelle on this date of service. This time includes time spent by me in the following activities:preparing for the visit, reviewing tests, obtaining and/or reviewing a separately obtained history, performing a medically appropriate examination and/or evaluation , counseling and educating the patient/family/caregiver, ordering medications, tests, or procedures, referring and communicating with other health care professionals , documenting information in the medical record and care coordination    Results for orders placed or performed in visit on 08/23/21   C-reactive Protein    Specimen: Blood   Result Value Ref Range    C-Reactive Protein 1.86 (H) 0.00 - 0.50 mg/dL   Comprehensive Metabolic Panel    Specimen: Blood   Result Value Ref Range    Glucose 103 (H) 65 - 99 mg/dL    BUN 15 6 - 20 mg/dL    Creatinine 1.13 (H) 0.57 - 1.00 mg/dL    Sodium 141 136 - 145 mmol/L    Potassium 4.2 3.5 - 5.2 mmol/L    Chloride 106 98 - 107 mmol/L    CO2 25.0 22.0 - 29.0 mmol/L    Calcium 9.1 8.6 - 10.5 mg/dL    Total Protein 7.4 6.0 - 8.5 g/dL    Albumin 3.90 3.50 - 5.20 g/dL    ALT (SGPT) 14 1 - 33 U/L    AST (SGOT) 16 1 - 32 U/L    Alkaline Phosphatase 88 39 - 117 U/L    Total Bilirubin 0.2 0.0 - 1.2 mg/dL    eGFR Non African Amer 52 (L) >60 mL/min/1.73    Globulin 3.5 gm/dL    A/G Ratio 1.1 g/dL     BUN/Creatinine Ratio 13.3 7.0 - 25.0    Anion Gap 10.0 5.0 - 15.0 mmol/L   Results for orders placed or performed in visit on 08/23/21   CBC Auto Differential    Specimen: Blood   Result Value Ref Range    WBC 9.28 3.40 - 10.80 10*3/mm3    RBC 4.33 3.77 - 5.28 10*6/mm3    Hemoglobin 11.2 (L) 12.0 - 15.9 g/dL    Hematocrit 36.2 34.0 - 46.6 %    MCV 83.6 79.0 - 97.0 fL    MCH 25.9 (L) 26.6 - 33.0 pg    MCHC 30.9 (L) 31.5 - 35.7 g/dL    RDW 16.3 (H) 12.3 - 15.4 %    RDW-SD 49.2 37.0 - 54.0 fl    MPV 10.3 6.0 - 12.0 fL    Platelets 433 140 - 450 10*3/mm3    Neutrophil % 55.7 42.7 - 76.0 %    Lymphocyte % 33.3 19.6 - 45.3 %    Monocyte % 7.9 5.0 - 12.0 %    Eosinophil % 2.3 0.3 - 6.2 %    Basophil % 0.6 0.0 - 1.5 %    Immature Grans % 0.2 0.0 - 0.5 %    Neutrophils, Absolute 5.17 1.70 - 7.00 10*3/mm3    Lymphocytes, Absolute 3.09 0.70 - 3.10 10*3/mm3    Monocytes, Absolute 0.73 0.10 - 0.90 10*3/mm3    Eosinophils, Absolute 0.21 0.00 - 0.40 10*3/mm3    Basophils, Absolute 0.06 0.00 - 0.20 10*3/mm3    Immature Grans, Absolute 0.02 0.00 - 0.05 10*3/mm3    nRBC 0.0 0.0 - 0.2 /100 WBC   Results for orders placed or performed in visit on 08/02/21   [facility]   Result Value Ref Range    Extra Tube Hold for add-ons.    Retic With IRF & RET-He    Specimen: Blood   Result Value Ref Range    Immature Reticulocyte Fraction 15.6 3.0 - 15.8 %    Reticulocyte % 1.46 0.70 - 1.90 %    Reticulocyte Absolute 0.0656 0.0200 - 0.1300 10*6/mm3    Reticulocyte Hgb 31.8 29.8 - 36.1 pg   CBC Auto Differential    Specimen: Blood   Result Value Ref Range    WBC 10.24 3.40 - 10.80 10*3/mm3    RBC 4.49 3.77 - 5.28 10*6/mm3    Hemoglobin 11.5 (L) 12.0 - 15.9 g/dL    Hematocrit 36.7 34.0 - 46.6 %    MCV 81.7 79.0 - 97.0 fL    MCH 25.6 (L) 26.6 - 33.0 pg    MCHC 31.3 (L) 31.5 - 35.7 g/dL    RDW 15.9 (H) 12.3 - 15.4 %    RDW-SD 47.2 37.0 - 54.0 fl    MPV 9.5 6.0 - 12.0 fL    Platelets 506 (H) 140 - 450 10*3/mm3    Neutrophil % 58.8 42.7 - 76.0 %     Lymphocyte % 30.3 19.6 - 45.3 %    Monocyte % 7.6 5.0 - 12.0 %    Eosinophil % 2.5 0.3 - 6.2 %    Basophil % 0.5 0.0 - 1.5 %    Immature Grans % 0.3 0.0 - 0.5 %    Neutrophils, Absolute 6.02 1.70 - 7.00 10*3/mm3    Lymphocytes, Absolute 3.10 0.70 - 3.10 10*3/mm3    Monocytes, Absolute 0.78 0.10 - 0.90 10*3/mm3    Eosinophils, Absolute 0.26 0.00 - 0.40 10*3/mm3    Basophils, Absolute 0.05 0.00 - 0.20 10*3/mm3    Immature Grans, Absolute 0.03 0.00 - 0.05 10*3/mm3    nRBC 0.0 0.0 - 0.2 /100 WBC   Iron and TIBC    Specimen: Blood   Result Value Ref Range    Iron 31 (L) 37 - 145 mcg/dL    Iron Saturation 9 (L) 20 - 50 %    Transferrin 232 200 - 360 mg/dL    TIBC 346 298 - 536 mcg/dL   Folate    Specimen: Blood   Result Value Ref Range    Folate 4.72 (L) 4.78 - 24.20 ng/mL   Ferritin    Specimen: Blood   Result Value Ref Range    Ferritin 118.00 13.00 - 150.00 ng/mL   Vitamin B12    Specimen: Blood   Result Value Ref Range    Vitamin B-12 345 211 - 946 pg/mL   Results for orders placed or performed during the hospital encounter of 07/27/21   COVID-19 and FLU A/B PCR - Swab, Nasopharynx    Specimen: Nasopharynx; Swab   Result Value Ref Range    COVID19 Not Detected Not Detected - Ref. Range    Influenza A PCR Not Detected Not Detected    Influenza B PCR Not Detected Not Detected   CBC Auto Differential    Specimen: Blood   Result Value Ref Range    WBC 10.68 3.40 - 10.80 10*3/mm3    RBC 4.34 3.77 - 5.28 10*6/mm3    Hemoglobin 11.2 (L) 12.0 - 15.9 g/dL    Hematocrit 35.4 34.0 - 46.6 %    MCV 81.6 79.0 - 97.0 fL    MCH 25.8 (L) 26.6 - 33.0 pg    MCHC 31.6 31.5 - 35.7 g/dL    RDW 15.9 (H) 12.3 - 15.4 %    RDW-SD 47.5 37.0 - 54.0 fl    MPV 9.4 6.0 - 12.0 fL    Platelets 460 (H) 140 - 450 10*3/mm3    Neutrophil % 54.7 42.7 - 76.0 %    Lymphocyte % 33.6 19.6 - 45.3 %    Monocyte % 8.3 5.0 - 12.0 %    Eosinophil % 2.5 0.3 - 6.2 %    Basophil % 0.6 0.0 - 1.5 %    Immature Grans % 0.3 0.0 - 0.5 %    Neutrophils, Absolute 5.84 1.70  - 7.00 10*3/mm3    Lymphocytes, Absolute 3.59 (H) 0.70 - 3.10 10*3/mm3    Monocytes, Absolute 0.89 0.10 - 0.90 10*3/mm3    Eosinophils, Absolute 0.27 0.00 - 0.40 10*3/mm3    Basophils, Absolute 0.06 0.00 - 0.20 10*3/mm3    Immature Grans, Absolute 0.03 0.00 - 0.05 10*3/mm3    nRBC 0.0 0.0 - 0.2 /100 WBC     *Note: Due to a large number of results and/or encounters for the requested time period, some results have not been displayed. A complete set of results can be found in Results Review.

## 2021-08-25 NOTE — PATIENT INSTRUCTIONS
MyPlate from USDA    MyPlate is an outline of a general healthy diet based on the 2010 Dietary Guidelines for Americans, from the U.[place]. Department of Agriculture (USDA). It sets guidelines for how much food you should eat from each food group based on your age, sex, and level of physical activity.  What are tips for following MyPlate?  To follow MyPlate recommendations:  · Eat a wide variety of fruits and vegetables, grains, and protein foods.  · Serve smaller portions and eat less food throughout the day.  · Limit portion sizes to avoid overeating.  · Enjoy your food.  · Get at least 150 minutes of exercise every week. This is about 30 minutes each day, 5 or more days per week.  It can be difficult to have every meal look like MyPlate. Think about MyPlate as eating guidelines for an entire day, rather than each individual meal.  Fruits and vegetables  · Make half of your plate fruits and vegetables.  · Eat many different colors of fruits and vegetables each day.  · For a 2,000 calorie daily food plan, eat:  ? 2½ cups of vegetables every day.  ? 2 cups of fruit every day.  · 1 cup is equal to:  ? 1 cup raw or cooked vegetables.  ? 1 cup raw fruit.  ? 1 medium-sized orange, apple, or banana.  ? 1 cup 100% fruit or vegetable juice.  ? 2 cups raw leafy greens, such as lettuce, spinach, or kale.  ? ½ cup dried fruit.  Grains  · One fourth of your plate should be grains.  · Make at least half of the grains you eat each day whole grains.  · For a 2,000 calorie daily food plan, eat 6 oz of grains every day.  · 1 oz is equal to:  ? 1 slice bread.  ? 1 cup cereal.  ? ½ cup cooked rice, cereal, or pasta.  Protein  · One fourth of your plate should be protein.  · Eat a wide variety of protein foods, including meat, poultry, fish, eggs, beans, nuts, and tofu.  · For a 2,000 calorie daily food plan, eat 5½ oz of protein every day.  · 1 oz is equal to:  ? 1 oz meat, poultry, or fish.  ? ¼ cup cooked beans.  ? 1 egg.  ? ½ oz nuts  or seeds.  ? 1 Tbsp peanut butter.  Dairy  · Drink fat-free or low-fat (1%) milk.  · Eat or drink dairy as a side to meals.  · For a 2,000 calorie daily food plan, eat or drink 3 cups of dairy every day.  · 1 cup is equal to:  ? 1 cup milk, yogurt, cottage cheese, or soy milk (soy beverage).  ? 2 oz processed cheese.  ? 1½ oz natural cheese.  Fats, oils, salt, and sugars  · Only small amounts of oils are recommended.  · Avoid foods that are high in calories and low in nutritional value (empty calories), like foods high in fat or added sugars.  · Choose foods that are low in salt (sodium). Choose foods that have less than 140 milligrams (mg) of sodium per serving.  · Drink water instead of sugary drinks. Drink enough water each day to keep your urine pale yellow.  Where to find support  · Work with your health care provider or a nutrition specialist (dietitian) to develop a customized eating plan that is right for you.  · Download an morales (mobile application) to help you track your daily food intake.  Where to find more information  · Go to ChooseMyPlate.gov for more information.  Summary  · MyPlate is a general guideline for healthy eating from the USDA. It is based on the 2010 Dietary Guidelines for Americans.  · In general, fruits and vegetables should take up ½ of your plate, grains should take up ¼ of your plate, and protein should take up ¼ of your plate.  This information is not intended to replace advice given to you by your health care provider. Make sure you discuss any questions you have with your health care provider.  Document Revised: 05/21/2020 Document Reviewed: 03/19/2018  [employer] Patient Education © 2021 [employer].    Colonoscopy, Adult  A colonoscopy is a procedure to look at the entire large intestine. This procedure is done using a long, thin, flexible tube that has a camera on the end.  You may have a colonoscopy:  · As a part of normal colorectal screening.  · If you have certain symptoms,  such as:  ? A low number of red blood cells in your blood (anemia).  ? Diarrhea that does not go away.  ? Pain in your abdomen.  ? Blood in your stool.  A colonoscopy can help screen for and diagnose medical problems, including:  · Tumors.  · Extra tissue that grows where mucus forms (polyps).  · Inflammation.  · Areas of bleeding.  Tell your health care provider about:  · Any allergies you have.  · All medicines you are taking, including vitamins, herbs, eye drops, creams, and over-the-counter medicines.  · Any problems you or family members have had with anesthetic medicines.  · Any blood disorders you have.  · Any surgeries you have had.  · Any medical conditions you have.  · Any problems you have had with having bowel movements.  · Whether you are pregnant or may be pregnant.  What are the risks?  Generally, this is a safe procedure. However, problems may occur, including:  · Bleeding.  · Damage to your intestine.  · Allergic reactions to medicines given during the procedure.  · Infection. This is rare.  What happens before the procedure?  Eating and drinking restrictions  Follow instructions from your health care provider about eating or drinking restrictions, which may include:  · A few days before the procedure:  ? Follow a low-fiber diet.  ? Avoid nuts, seeds, dried fruit, raw fruits, and vegetables.  · 1-3 days before the procedure:  ? Eat only gelatin dessert or ice pops.  ? Drink only clear liquids, such as water, clear juice, clear broth or bouillon, black coffee or tea, or clear soft drinks or sports drinks.  ? Avoid liquids that contain red or purple dye.  · The day of the procedure:  ? Do not eat solid foods. You may continue to drink clear liquids until up to 2 hours before the procedure.  ? Do not eat or drink anything starting 2 hours before the procedure, or within the time period that your health care provider recommends.  Bowel prep  If you were prescribed a bowel prep to take by mouth (orally)  to clean out your colon:  · Take it as told by your health care provider. Starting the day before your procedure, you will need to drink a large amount of liquid medicine. The liquid will cause you to have many bowel movements of loose stool until your stool becomes almost clear or light green.  · If your skin or the opening between the buttocks (anus) gets irritated from diarrhea, you may relieve the irritation using:  ? Wipes with medicine in them, such as adult wet wipes with aloe and vitamin E.  ? A product to soothe skin, such as petroleum jelly.  · If you vomit while drinking the bowel prep:  ? Take a break for up to 60 minutes.  ? Begin the bowel prep again.  ? Call your health care provider if you keep vomiting or you cannot take the bowel prep without vomiting.  · To clean out your colon, you may also be given:  ? Laxative medicines. These help you have a bowel movement.  ? Instructions for enema use. An enema is liquid medicine injected into your rectum.  Medicines  Ask your health care provider about:  · Changing or stopping your regular medicines or supplements. This is especially important if you are taking iron supplements, diabetes medicines, or blood thinners.  · Taking medicines such as aspirin and ibuprofen. These medicines can thin your blood. Do not take these medicines unless your health care provider tells you to take them.  · Taking over-the-counter medicines, vitamins, herbs, and supplements.  General instructions  · Ask your health care provider what steps will be taken to help prevent infection. These may include washing skin with a germ-killing soap.  · Plan to have someone take you home from the hospital or clinic.  What happens during the procedure?    · An IV will be inserted into one of your veins.  · You may be given one or more of the following:  ? A medicine to help you relax (sedative).  ? A medicine to numb the area (local anesthetic).  ? A medicine to make you fall asleep  (general anesthetic). This is rarely needed.  · You will lie on your side with your knees bent.  · The tube will:  ? Have oil or gel put on it (be lubricated).  ? Be inserted into your anus.  ? Be gently eased through all parts of your large intestine.  · Air will be sent into your colon to keep it open. This may cause some pressure or cramping.  · Images will be taken with the camera and will appear on a screen.  · A small tissue sample may be removed to be looked at under a microscope (biopsy). The tissue may be sent to a lab for testing if any signs of problems are found.  · If small polyps are found, they may be removed and checked for cancer cells.  · When the procedure is finished, the tube will be removed.  The procedure may vary among health care providers and hospitals.  What happens after the procedure?  · Your blood pressure, heart rate, breathing rate, and blood oxygen level will be monitored until you leave the hospital or clinic.  · You may have a small amount of blood in your stool.  · You may pass gas and have mild cramping or bloating in your abdomen. This is caused by the air that was used to open your colon during the exam.  · Do not drive for 24 hours after the procedure.  · It is up to you to get the results of your procedure. Ask your health care provider, or the department that is doing the procedure, when your results will be ready.  Summary  · A colonoscopy is a procedure to look at the entire large intestine.  · Follow instructions from your health care provider about eating and drinking before the procedure.  · If you were prescribed an oral bowel prep to clean out your colon, take it as told by your health care provider.  · During the colonoscopy, a flexible tube with a camera on its end is inserted into the anus and then passed into the other parts of the large intestine.  This information is not intended to replace advice given to you by your health care provider. Make sure you discuss  any questions you have with your health care provider.  Document Revised: 07/10/2020 Document Reviewed: 07/10/2020  Elsevier Patient Education © 2021 [employer] Inc.

## 2021-08-26 LAB
LAB AP CASE REPORT: NORMAL
PATH INTERP SPEC-IMP: NORMAL

## 2021-08-31 ENCOUNTER — HOSPITAL ENCOUNTER (OUTPATIENT)
Dept: MRI IMAGING | Facility: HOSPITAL | Age: 46
Discharge: HOME | End: 2021-08-31
Admitting: OBSTETRICS & GYNECOLOGY

## 2021-08-31 DIAGNOSIS — D21.9 FIBROID TUMOR: ICD-10-CM

## 2021-08-31 DIAGNOSIS — R10.2 PELVIC PAIN: ICD-10-CM

## 2021-08-31 PROCEDURE — 72197 MRI PELVIS W/O & W/DYE: CPT

## 2021-08-31 PROCEDURE — A9579 GAD-BASE MR CONTRAST NOS,1ML: HCPCS | Performed by: OBSTETRICS & GYNECOLOGY

## 2021-08-31 PROCEDURE — 25010000002 GADOTERIDOL PER 1 ML: Performed by: OBSTETRICS & GYNECOLOGY

## 2021-08-31 RX ADMIN — GADOTERIDOL 20 ML: 279.3 INJECTION, SOLUTION INTRAVENOUS at 16:15

## 2021-09-07 ENCOUNTER — LAB (OUTPATIENT)
Dept: LAB | Facility: HOSPITAL | Age: 46
End: 2021-09-07

## 2021-09-07 DIAGNOSIS — Z01.818 PREOP TESTING: Primary | ICD-10-CM

## 2021-09-07 LAB — SARS-COV-2 N GENE RESP QL NAA+PROBE: DETECTED

## 2021-09-07 PROCEDURE — 87635 SARS-COV-2 COVID-19 AMP PRB: CPT

## 2021-09-07 PROCEDURE — C9803 HOPD COVID-19 SPEC COLLECT: HCPCS

## 2021-09-28 ENCOUNTER — HOSPITAL ENCOUNTER (OUTPATIENT)
Facility: HOSPITAL | Age: 46
Setting detail: HOSPITAL OUTPATIENT SURGERY
Discharge: HOME | End: 2021-09-28
Attending: INTERNAL MEDICINE | Admitting: INTERNAL MEDICINE

## 2021-09-28 ENCOUNTER — ANESTHESIA EVENT (OUTPATIENT)
Dept: GASTROENTEROLOGY | Facility: HOSPITAL | Age: 46
End: 2021-09-28

## 2021-09-28 ENCOUNTER — ANESTHESIA (OUTPATIENT)
Dept: GASTROENTEROLOGY | Facility: HOSPITAL | Age: 46
End: 2021-09-28

## 2021-09-28 VITALS
RESPIRATION RATE: 18 BRPM | SYSTOLIC BLOOD PRESSURE: 141 MMHG | HEART RATE: 72 BPM | HEIGHT: 66 IN | OXYGEN SATURATION: 97 % | BODY MASS INDEX: 47.09 KG/M2 | DIASTOLIC BLOOD PRESSURE: 74 MMHG | WEIGHT: 293 LBS | TEMPERATURE: 97.2 F

## 2021-09-28 DIAGNOSIS — D50.0 IRON DEFICIENCY ANEMIA DUE TO CHRONIC BLOOD LOSS: ICD-10-CM

## 2021-09-28 DIAGNOSIS — R10.30 LOWER ABDOMINAL PAIN: ICD-10-CM

## 2021-09-28 DIAGNOSIS — R10.84 GENERALIZED ABDOMINAL PAIN: ICD-10-CM

## 2021-09-28 PROCEDURE — 25010000002 PROPOFOL 10 MG/ML EMULSION: Performed by: NURSE ANESTHETIST, CERTIFIED REGISTERED

## 2021-09-28 PROCEDURE — 25010000002 MIDAZOLAM PER 1 MG: Performed by: NURSE ANESTHETIST, CERTIFIED REGISTERED

## 2021-09-28 PROCEDURE — 45380 COLONOSCOPY AND BIOPSY: CPT | Performed by: INTERNAL MEDICINE

## 2021-09-28 PROCEDURE — 25010000002 FENTANYL CITRATE (PF) 50 MCG/ML SOLUTION: Performed by: NURSE ANESTHETIST, CERTIFIED REGISTERED

## 2021-09-28 PROCEDURE — 43239 EGD BIOPSY SINGLE/MULTIPLE: CPT | Performed by: INTERNAL MEDICINE

## 2021-09-28 RX ORDER — LIDOCAINE HYDROCHLORIDE 20 MG/ML
INJECTION, SOLUTION INTRAVENOUS AS NEEDED
Status: DISCONTINUED | OUTPATIENT
Start: 2021-09-28 | End: 2021-09-28 | Stop reason: SURG

## 2021-09-28 RX ORDER — MEPERIDINE HYDROCHLORIDE 25 MG/ML
12.5 INJECTION INTRAMUSCULAR; INTRAVENOUS; SUBCUTANEOUS
Status: DISCONTINUED | OUTPATIENT
Start: 2021-09-28 | End: 2021-09-28 | Stop reason: HOSPADM

## 2021-09-28 RX ORDER — DEXTROSE AND SODIUM CHLORIDE 5; .45 G/100ML; G/100ML
30 INJECTION, SOLUTION INTRAVENOUS CONTINUOUS PRN
Status: DISCONTINUED | OUTPATIENT
Start: 2021-09-28 | End: 2021-09-28 | Stop reason: HOSPADM

## 2021-09-28 RX ORDER — FENTANYL CITRATE 50 UG/ML
INJECTION, SOLUTION INTRAMUSCULAR; INTRAVENOUS AS NEEDED
Status: DISCONTINUED | OUTPATIENT
Start: 2021-09-28 | End: 2021-09-28 | Stop reason: SURG

## 2021-09-28 RX ORDER — MIDAZOLAM HYDROCHLORIDE 1 MG/ML
INJECTION INTRAMUSCULAR; INTRAVENOUS AS NEEDED
Status: DISCONTINUED | OUTPATIENT
Start: 2021-09-28 | End: 2021-09-28 | Stop reason: SURG

## 2021-09-28 RX ORDER — ONDANSETRON 2 MG/ML
4 INJECTION INTRAMUSCULAR; INTRAVENOUS ONCE AS NEEDED
Status: DISCONTINUED | OUTPATIENT
Start: 2021-09-28 | End: 2021-09-28 | Stop reason: HOSPADM

## 2021-09-28 RX ORDER — PROPOFOL 10 MG/ML
VIAL (ML) INTRAVENOUS AS NEEDED
Status: DISCONTINUED | OUTPATIENT
Start: 2021-09-28 | End: 2021-09-28 | Stop reason: SURG

## 2021-09-28 RX ADMIN — MIDAZOLAM HYDROCHLORIDE 2 MG: 2 INJECTION, SOLUTION INTRAMUSCULAR; INTRAVENOUS at 14:06

## 2021-09-28 RX ADMIN — PROPOFOL 50 MG: 10 INJECTION, EMULSION INTRAVENOUS at 14:14

## 2021-09-28 RX ADMIN — PROPOFOL 50 MG: 10 INJECTION, EMULSION INTRAVENOUS at 14:09

## 2021-09-28 RX ADMIN — DEXTROSE AND SODIUM CHLORIDE 30 ML/HR: 5; 450 INJECTION, SOLUTION INTRAVENOUS at 13:43

## 2021-09-28 RX ADMIN — PROPOFOL 50 MG: 10 INJECTION, EMULSION INTRAVENOUS at 14:21

## 2021-09-28 RX ADMIN — PROPOFOL 30 MG: 10 INJECTION, EMULSION INTRAVENOUS at 14:06

## 2021-09-28 RX ADMIN — LIDOCAINE HYDROCHLORIDE 100 MG: 20 INJECTION, SOLUTION INTRAVENOUS at 14:06

## 2021-09-28 RX ADMIN — FENTANYL CITRATE 100 MCG: 50 INJECTION INTRAMUSCULAR; INTRAVENOUS at 14:06

## 2021-09-28 NOTE — ANESTHESIA PREPROCEDURE EVALUATION
Anesthesia Evaluation     Patient summary reviewed and Nursing notes reviewed                Airway   Mallampati: II  Dental - normal exam     Pulmonary - normal exam   (+) asthma,    ROS comment: COVID+ 9/9/21  Cardiovascular - normal exam    (+) hypertension,       Neuro/Psych  GI/Hepatic/Renal/Endo    (+)  GERD,      Musculoskeletal     Abdominal  - normal exam   Substance History - negative use     OB/GYN negative ob/gyn ROS         Other   arthritis,                      Anesthesia Plan    ASA 2     MAC     intravenous induction     Anesthetic plan, all risks, benefits, and alternatives have been provided, discussed and informed consent has been obtained with: patient.

## 2021-09-28 NOTE — ANESTHESIA POSTPROCEDURE EVALUATION
Patient: Michelle Lange    Procedure Summary     Date: 09/28/21 Room / Location: [facility] ENDOSCOPY 2 / [facility] ENDOSCOPY    Anesthesia Start: 1403 Anesthesia Stop: 1422    Procedures:       ESOPHAGOGASTRODUODENOSCOPY (N/A )      COLONOSCOPY (N/A ) Diagnosis:       Iron deficiency anemia due to chronic blood loss      Lower abdominal pain      (Iron deficiency anemia due to chronic blood loss [D50.0])      (Lower abdominal pain [R10.30])    Surgeons: Michelle Lemus MD Provider: Gloria Porter CRNA    Anesthesia Type: MAC ASA Status: 2          Anesthesia Type: MAC    Vitals  No vitals data found for the desired time range.          Post Anesthesia Care and Evaluation    Patient location during evaluation: PACU  Patient participation: complete - patient participated  Level of consciousness: awake and alert  Pain score: 0  Pain management: adequate  Airway patency: patent  Anesthetic complications: No anesthetic complications  PONV Status: none  Cardiovascular status: acceptable  Respiratory status: acceptable  Hydration status: acceptable

## 2021-10-01 LAB
LAB AP CASE REPORT: NORMAL
PATH REPORT.FINAL DX SPEC: NORMAL

## 2021-10-04 ENCOUNTER — LAB (OUTPATIENT)
Dept: ONCOLOGY | Facility: HOSPITAL | Age: 46
End: 2021-10-04

## 2021-10-04 ENCOUNTER — OFFICE VISIT (OUTPATIENT)
Dept: ONCOLOGY | Facility: CLINIC | Age: 46
End: 2021-10-04

## 2021-10-04 VITALS
OXYGEN SATURATION: 100 % | RESPIRATION RATE: 18 BRPM | DIASTOLIC BLOOD PRESSURE: 81 MMHG | WEIGHT: 293 LBS | BODY MASS INDEX: 49.84 KG/M2 | SYSTOLIC BLOOD PRESSURE: 173 MMHG | TEMPERATURE: 96.8 F | HEART RATE: 71 BPM

## 2021-10-04 DIAGNOSIS — D50.0 IRON DEFICIENCY ANEMIA DUE TO CHRONIC BLOOD LOSS: Primary | Chronic | ICD-10-CM

## 2021-10-04 DIAGNOSIS — D75.839 THROMBOCYTOSIS: Chronic | ICD-10-CM

## 2021-10-04 DIAGNOSIS — D64.9 ANEMIA, UNSPECIFIED TYPE: ICD-10-CM

## 2021-10-04 DIAGNOSIS — D75.839 THROMBOCYTOSIS: ICD-10-CM

## 2021-10-04 DIAGNOSIS — T45.4X5A ADVERSE EFFECT OF IRON, INITIAL ENCOUNTER: Chronic | ICD-10-CM

## 2021-10-04 LAB
BASOPHILS # BLD AUTO: 0.06 10*3/MM3 (ref 0–0.2)
BASOPHILS NFR BLD AUTO: 0.7 % (ref 0–1.5)
DEPRECATED RDW RBC AUTO: 43.8 FL (ref 37–54)
EOSINOPHIL # BLD AUTO: 0.24 10*3/MM3 (ref 0–0.4)
EOSINOPHIL NFR BLD AUTO: 2.8 % (ref 0.3–6.2)
ERYTHROCYTE [DISTWIDTH] IN BLOOD BY AUTOMATED COUNT: 14.9 % (ref 12.3–15.4)
FERRITIN SERPL-MCNC: 74.62 NG/ML (ref 13–150)
FOLATE SERPL-MCNC: 5.98 NG/ML (ref 4.78–24.2)
HCT VFR BLD AUTO: 36.7 % (ref 34–46.6)
HGB BLD-MCNC: 11.8 G/DL (ref 12–15.9)
IMM GRANULOCYTES # BLD AUTO: 0.02 10*3/MM3 (ref 0–0.05)
IMM GRANULOCYTES NFR BLD AUTO: 0.2 % (ref 0–0.5)
IRON 24H UR-MRATE: 37 MCG/DL (ref 37–145)
IRON SATN MFR SERPL: 10 % (ref 20–50)
LYMPHOCYTES # BLD AUTO: 2.94 10*3/MM3 (ref 0.7–3.1)
LYMPHOCYTES NFR BLD AUTO: 34.8 % (ref 19.6–45.3)
MCH RBC QN AUTO: 26.2 PG (ref 26.6–33)
MCHC RBC AUTO-ENTMCNC: 32.2 G/DL (ref 31.5–35.7)
MCV RBC AUTO: 81.6 FL (ref 79–97)
MONOCYTES # BLD AUTO: 0.66 10*3/MM3 (ref 0.1–0.9)
MONOCYTES NFR BLD AUTO: 7.8 % (ref 5–12)
NEUTROPHILS NFR BLD AUTO: 4.53 10*3/MM3 (ref 1.7–7)
NEUTROPHILS NFR BLD AUTO: 53.7 % (ref 42.7–76)
NRBC BLD AUTO-RTO: 0 /100 WBC (ref 0–0.2)
PLATELET # BLD AUTO: 433 10*3/MM3 (ref 140–450)
PMV BLD AUTO: 9.6 FL (ref 6–12)
RBC # BLD AUTO: 4.5 10*6/MM3 (ref 3.77–5.28)
TIBC SERPL-MCNC: 353 MCG/DL (ref 298–536)
TRANSFERRIN SERPL-MCNC: 237 MG/DL (ref 200–360)
VIT B12 BLD-MCNC: 349 PG/ML (ref 211–946)
WBC # BLD AUTO: 8.45 10*3/MM3 (ref 3.4–10.8)

## 2021-10-04 PROCEDURE — 82728 ASSAY OF FERRITIN: CPT

## 2021-10-04 PROCEDURE — 82607 VITAMIN B-12: CPT

## 2021-10-04 PROCEDURE — 1125F AMNT PAIN NOTED PAIN PRSNT: CPT | Performed by: INTERNAL MEDICINE

## 2021-10-04 PROCEDURE — 99214 OFFICE O/P EST MOD 30 MIN: CPT | Performed by: INTERNAL MEDICINE

## 2021-10-04 PROCEDURE — 84466 ASSAY OF TRANSFERRIN: CPT

## 2021-10-04 PROCEDURE — G0463 HOSPITAL OUTPT CLINIC VISIT: HCPCS | Performed by: INTERNAL MEDICINE

## 2021-10-04 PROCEDURE — 85025 COMPLETE CBC W/AUTO DIFF WBC: CPT

## 2021-10-04 PROCEDURE — 82746 ASSAY OF FOLIC ACID SERUM: CPT

## 2021-10-04 PROCEDURE — 83540 ASSAY OF IRON: CPT

## 2021-10-04 RX ORDER — POLYETHYLENE GLYCOL 3350, SODIUM SULFATE, SODIUM CHLORIDE, POTASSIUM CHLORIDE, ASCORBIC ACID, SODIUM ASCORBATE 7.5-2.691G
KIT ORAL
COMMUNITY
Start: 2021-08-27 | End: 2021-10-05

## 2021-10-04 NOTE — PROGRESS NOTES
DATE OF VISIT: 10/4/2021      REASON FOR VISIT: Anemia, thrombocytosis      HISTORY OF PRESENT ILLNESS:   46-year-old female with medical problem consisting of hypertension, plantar fasciitis, endometrial ablation done in 2014, gastric sleeve surgery done around 2017 was initially seen in consultation on August 2, 2021 for anemia and thrombocytosis.  Patient is here for follow-up appointment today.  Complains of fatigue.  States she has been diagnosed with bleeding from her uterine wall for which she is scheduled to go hysterectomy in the near future.  Continues to have pica for ice.  Denies any new lymph node enlargement.  Denies any bleeding      Past Medical History, Past Surgical History, Social History, Family History have been reviewed and are without significant changes except as mentioned.    Review of Systems   Constitutional: Positive for fatigue.   Musculoskeletal: Positive for arthralgias.   Hematological: Negative for adenopathy.      A comprehensive 14 point review of systems was performed and was negative except as mentioned.    Medications:  The current medication list was reviewed in the EMR    ALLERGIES:    Allergies   Allergen Reactions   • Ibuprofen Anaphylaxis   • Phenergan [Promethazine] Other (See Comments)     Pt states arms and legs jump uncontrollably        Objective      Vitals:    10/04/21 1408   BP: 173/81   Pulse: 71   Resp: 18   Temp: 96.8 °F (36 °C)   SpO2: 100%   Weight: (!) 140 kg (308 lb 12.8 oz)   PainSc:   5     No flowsheet data found.    Physical Exam  Pulmonary:      Breath sounds: Normal breath sounds.   Neurological:      Mental Status: She is alert and oriented to person, place, and time.           RECENT LABS:  Glucose   Date Value Ref Range Status   08/23/2021 103 (H) 65 - 99 mg/dL Final     Sodium   Date Value Ref Range Status   08/23/2021 141 136 - 145 mmol/L Final     Potassium   Date Value Ref Range Status   08/23/2021 4.2 3.5 - 5.2 mmol/L Final     CO2   Date Value  Ref Range Status   08/23/2021 25.0 22.0 - 29.0 mmol/L Final     Chloride   Date Value Ref Range Status   08/23/2021 106 98 - 107 mmol/L Final     Anion Gap   Date Value Ref Range Status   08/23/2021 10.0 5.0 - 15.0 mmol/L Final     Creatinine   Date Value Ref Range Status   08/23/2021 1.13 (H) 0.57 - 1.00 mg/dL Final     BUN   Date Value Ref Range Status   08/23/2021 15 6 - 20 mg/dL Final     BUN/Creatinine Ratio   Date Value Ref Range Status   08/23/2021 13.3 7.0 - 25.0 Final     Calcium   Date Value Ref Range Status   08/23/2021 9.1 8.6 - 10.5 mg/dL Final     eGFR Non  Amer   Date Value Ref Range Status   08/23/2021 52 (L) >60 mL/min/1.73 Final     Alkaline Phosphatase   Date Value Ref Range Status   08/23/2021 88 39 - 117 U/L Final     Total Protein   Date Value Ref Range Status   08/23/2021 7.4 6.0 - 8.5 g/dL Final     ALT (SGPT)   Date Value Ref Range Status   08/23/2021 14 1 - 33 U/L Final     AST (SGOT)   Date Value Ref Range Status   08/23/2021 16 1 - 32 U/L Final     Total Bilirubin   Date Value Ref Range Status   08/23/2021 0.2 0.0 - 1.2 mg/dL Final     Albumin   Date Value Ref Range Status   08/23/2021 3.90 3.50 - 5.20 g/dL Final     Globulin   Date Value Ref Range Status   08/23/2021 3.5 gm/dL Final     Lab Results   Component Value Date    WBC 8.45 10/04/2021    HGB 11.8 (L) 10/04/2021    HCT 36.7 10/04/2021    MCV 81.6 10/04/2021     10/04/2021     Lab Results   Component Value Date    NEUTROABS 4.53 10/04/2021    IRON 37 10/04/2021    IRON 31 (L) 08/02/2021    TIBC 353 10/04/2021    TIBC 346 08/02/2021    LABIRON 10 (L) 10/04/2021    LABIRON 9 (L) 08/02/2021    FERRITIN 74.62 10/04/2021    FERRITIN 118.00 08/02/2021    [id number] 345 08/02/2021    YHAXFOMM55 213 (L) 07/10/2020    FOLATE 4.72 (L) 08/02/2021    FOLATE 5.06 07/10/2020     Lab Results   Component Value Date    HCGQUANT 5.41 (H) 06/01/2018         PATHOLOGY:  * Cannot find OR log *         RADIOLOGY DATA :  No radiology  results for the last 7 days        Assessment/Plan     1.  Anemia  -Anemia work-up done in August 2021 showed hemoglobin of 11.1 component of iron deficiency for which intravenous injectafer was ordered patient never received it.  -Patient also had folate deficiency with folate level of 4.2 for which she has been started on folic acid supplement along with B12 p.o. daily  -Anemia work-up done today shows hemoglobin is 11.8.  Iron studies shows persistent iron deficiency with iron saturation of 10%.  Recommend intravenous Injectafer to be started next week.  Side effect of Injectafer were discussed.  -We will ask patient to return to clinic in 3 months with repeat CBC, iron studies, ferritin, B12 and folate to be done on that day    2.  Folate deficiency  -Remains on folic acid p.o. daily    3.  Thrombocytosis  -Reactive.  -Platelet count today is 433,000    4.  History of gastric sleeve surgery    5.  Health maintenance: Patient does not smoke and had a colonoscopy and EGD in September 2021                     PHQ-9 Total Score: 0   -Patient is not homicidal or suicidal.  No acute intervention required.    Michelle Lange reports a pain score of 5.  Given her pain assessment as noted, treatment options were discussed and the following options were decided upon as a follow-up plan to address the patient's pain: continuation of current treatment plan for pain.         Bg Segura MD  10/4/2021  16:44 CDT        Part of this note may be an electronic transcription/translation of spoken language to printed text using the Dragon Dictation System.          CC:

## 2021-10-05 ENCOUNTER — TELEPHONE (OUTPATIENT)
Dept: ONCOLOGY | Facility: HOSPITAL | Age: 46
End: 2021-10-05

## 2021-10-05 ENCOUNTER — OFFICE VISIT (OUTPATIENT)
Dept: GASTROENTEROLOGY | Facility: CLINIC | Age: 46
End: 2021-10-05

## 2021-10-05 VITALS
DIASTOLIC BLOOD PRESSURE: 96 MMHG | BODY MASS INDEX: 47.09 KG/M2 | SYSTOLIC BLOOD PRESSURE: 142 MMHG | HEART RATE: 74 BPM | HEIGHT: 66 IN | WEIGHT: 293 LBS

## 2021-10-05 DIAGNOSIS — K63.5 CECAL POLYP: ICD-10-CM

## 2021-10-05 DIAGNOSIS — K29.50 CHRONIC GASTRITIS WITHOUT BLEEDING, UNSPECIFIED GASTRITIS TYPE: ICD-10-CM

## 2021-10-05 DIAGNOSIS — K21.00 GASTROESOPHAGEAL REFLUX DISEASE WITH ESOPHAGITIS WITHOUT HEMORRHAGE: Primary | ICD-10-CM

## 2021-10-05 PROCEDURE — 99213 OFFICE O/P EST LOW 20 MIN: CPT | Performed by: NURSE PRACTITIONER

## 2021-10-05 RX ORDER — PANTOPRAZOLE SODIUM 40 MG/1
40 TABLET, DELAYED RELEASE ORAL DAILY
Qty: 30 TABLET | Refills: 5 | Status: SHIPPED | OUTPATIENT
Start: 2021-10-05 | End: 2021-11-16

## 2021-10-05 NOTE — PATIENT INSTRUCTIONS
"BMI for Adults  What is BMI?  Body mass index (BMI) is a number that is calculated from a person's weight and height. BMI can help estimate how much of a person's weight is composed of fat. BMI does not measure body fat directly. Rather, it is an alternative to procedures that directly measure body fat, which can be difficult and expensive.  BMI can help identify people who may be at higher risk for certain medical problems.  What are BMI measurements used for?  BMI is used as a screening tool to identify possible weight problems. It helps determine whether a person is obese, overweight, a healthy weight, or underweight.  BMI is useful for:  · Identifying a weight problem that may be related to a medical condition or may increase the risk for medical problems.  · Promoting changes, such as changes in diet and exercise, to help reach a healthy weight. BMI screening can be repeated to see if these changes are working.  How is BMI calculated?  BMI involves measuring your weight in relation to your height. Both height and weight are measured, and the BMI is calculated from those numbers. This can be done either in English ([place] or metric measurements. Note that charts and online BMI calculators are available to help you find your BMI quickly and easily without having to do these calculations yourself.  To calculate your BMI in English ([place] measurements:    1. Measure your weight in pounds (lb).  2. Multiply the number of pounds by 703.  ? For example, for a person who weighs 180 lb, multiply that number by 703, which equals 126,540.  3. Measure your height in inches. Then multiply that number by itself to get a measurement called \"inches squared.\"  ? For example, for a person who is 70 inches tall, the \"inches squared\" measurement is 70 inches x 70 inches, which equals 4,900 inches squared.  4. Divide the total from step 2 (number of lb x 703) by the total from step 3 (inches squared): 126,540 ÷ 4,900 = 25.8. This is " "your BMI.    To calculate your BMI in metric measurements:  1. Measure your weight in kilograms (kg).  2. Measure your height in meters (m). Then multiply that number by itself to get a measurement called \"meters squared.\"  ? For example, for a person who is 1.75 m tall, the \"meters squared\" measurement is 1.75 m x 1.75 m, which is equal to 3.1 meters squared.  3. Divide the number of kilograms (your weight) by the meters squared number. In this example: 70 ÷ 3.1 = 22.6. This is your BMI.  What do the results mean?  BMI charts are used to identify whether you are underweight, normal weight, overweight, or obese. The following guidelines will be used:  · Underweight: BMI less than 18.5.  · Normal weight: BMI between 18.5 and 24.9.  · Overweight: BMI between 25 and 29.9.  · Obese: BMI of 30 or above.  Keep these notes in mind:  · Weight includes both fat and muscle, so someone with a muscular build, such as an athlete, may have a BMI that is higher than 24.9. In cases like these, BMI is not an accurate measure of body fat.  · To determine if excess body fat is the cause of a BMI of 25 or higher, further assessments may need to be done by a health care provider.  · BMI is usually interpreted in the same way for men and women.  Where to find more information  For more information about BMI, including tools to quickly calculate your BMI, go to these websites:  · Centers for Disease Control and Prevention: www.cdc.gov  · American Heart Association: www.heart.org  · National Heart, Lung, and Blood [place]: www.nhlbi.nih.gov  Summary  · Body mass index (BMI) is a number that is calculated from a person's weight and height.  · BMI may help estimate how much of a person's weight is composed of fat. BMI can help identify those who may be at higher risk for certain medical problems.  · BMI can be measured using English measurements or metric measurements.  · BMI charts are used to identify whether you are underweight, normal " weight, overweight, or obese.  This information is not intended to replace advice given to you by your health care provider. Make sure you discuss any questions you have with your health care provider.  Document Revised: 09/09/2020 Document Reviewed: 07/17/2020  Elsevier Patient Education © 2021 [employer].

## 2021-10-05 NOTE — PROGRESS NOTES
Chief Complaint   Patient presents with   • endo f/u       Subjective    Michelle Lange is a 46 y.o. female. she is here today for follow-up.    46-year-old female presents to discuss EGD and colonoscopy results.  Reports she still has epigastric pain and acid reflux even if she eats spicy food such as bread.  Has been taking Prilosec for several years without relief in symptoms.  Anemia has been stable she is scheduled to have iron infusion and is going to have a hysterectomy next month.    Heartburn  She complains of abdominal pain, heartburn and nausea. She reports no belching, no chest pain, no choking, no coughing, no dysphagia, no early satiety, no globus sensation, no hoarse voice or no sore throat. This is a chronic problem. The problem occurs occasionally. The heartburn duration is several minutes. The heartburn is located in the substernum. The heartburn is of moderate intensity. The heartburn wakes her from sleep. The heartburn limits her activity. The heartburn changes with position. The symptoms are aggravated by certain foods. Associated symptoms include fatigue. She has tried a PPI for the symptoms. The treatment provided no relief. Past procedures include an EGD.   EGD 9/28/2021 noted grade 1 esophagitis, gastritis and normal duodenum.  Antral biopsy noted reactive gastropathy.  GE junction biopsy noted reactive gastric squamous mucosa negative for  Moore's mucosa.  Duodenal biopsy noted no significant histologic abnormality.  Colonoscopy had suboptimal prep.  Scope was advanced to the cecum and a small 5 mm polyp was removed from cecum.  Hemorrhoids were found described as small.  Cecal polyp was inflammatory repeat is recommended in 1 year due to suboptimal prep.         The following portions of the patient's history were reviewed and updated as appropriate:   Past Medical History:   Diagnosis Date   • Asthma    • Excessive or frequent menstruation    • GERD (gastroesophageal reflux  disease)    • History of mammogram 2016   • Hypertension    • Otitis media    • Otorrhea    • Plantar fasciitis      Past Surgical History:   Procedure Laterality Date   •  SECTION     • CHOLECYSTECTOMY     • COLONOSCOPY N/A 2021    Procedure: COLONOSCOPY;  Surgeon: Michelle Lemus MD;  Location: [facility] ENDOSCOPY;  Service: Gastroenterology;  Laterality: N/A;   • ENDOSCOPY N/A 2021    Procedure: ESOPHAGOGASTRODUODENOSCOPY;  Surgeon: Michelle Lemus MD;  Location: [facility] ENDOSCOPY;  Service: Gastroenterology;  Laterality: N/A;   • GASTRIC SLEEVE LAPAROSCOPIC     • INJECTION OF MEDICATION  2015    Kenalog (1)     • KNEE SURGERY Right    • OTHER SURGICAL HISTORY  2014    Hysteroscope procedure (1)    Exam under anesthesia, diagnostic hysteroscopy with fractional dilation and curettage and NovaSure endometrial ablation.    • TUBAL ABDOMINAL LIGATION     • UPPER GASTROINTESTINAL ENDOSCOPY  2021     Family History   Problem Relation Age of Onset   • Diabetes Other    • Heart disease Other    • Hypertension Other    • Stroke Other    • Seizures Other    • Heart disease Mother    • Hypertension Mother    • Diabetes Father    • Heart disease Father    • Hypertension Father    • Osteoporosis Father    • Diabetes Maternal Grandmother    • Hypertension Maternal Grandmother    • Diabetes Paternal Grandmother    • Heart disease Paternal Grandmother    • Hypertension Paternal Grandmother    • Osteoporosis Paternal Grandmother      OB History    No obstetric history on file.       Prior to Admission medications    Medication Sig Start Date End Date Taking? Authorizing Provider   albuterol (PROVENTIL HFA;VENTOLIN HFA) 108 (90 Base) MCG/ACT inhaler Inhale 2 puffs Every 4 (Four) Hours As Needed for Wheezing.   Yes Emergency, Nurse Luma RN   amLODIPine (NORVASC) 10 MG tablet  21  Yes Provider, MD Weston   buPROPion XL (WELLBUTRIN XL) 150 MG 24 hr tablet  21  Yes Provider,  MD Weston   butalbital-acetaminophen-caffeine (FIORICET, ESGIC) -40 MG per tablet Take 1 tablet by mouth. 7/10/20  Yes Emergency, Nurse CHRISTIE Ge   cyanocobalamin (VITAMIN B-12) 1000 MCG tablet Take 1 tablet by mouth Daily. 8/3/21  Yes Bg Segura MD   ergocalciferol (ERGOCALCIFEROL) 1.25 MG (39965 UT) capsule Take 50,000 Units by mouth. 7/9/21 7/10/22 Yes Weston Rose MD   ferrous gluconate (FERGON) 324 MG tablet Take 324 mg by mouth. 7/9/21 7/10/22 Yes ProviderWeston MD   folic acid (FOLVITE) 1 MG tablet Take 1 tablet by mouth Daily. 8/3/21  Yes Bg Segura MD   gabapentin (NEURONTIN) 600 MG tablet Take 300 mg by mouth 4 (Four) Times a Day. 7/10/20  Yes Emergency, Nurse CHRISTIE Ge   hydroCHLOROthiazide (HYDRODIURIL) 12.5 MG tablet  4/13/21  Yes ProviderWeston MD   irbesartan (AVAPRO) 150 MG tablet Take 150 mg by mouth. 7/10/20  Yes Emergency, Nurse Epic, RN   MoviPrep 100 g reconstituted solution powder MIX AND TAKE BY MOUTH EVERY 12 HOURS AS DIRECTED 8/27/21 10/5/21 Yes ProviderWeston MD   omeprazole (PrilOSEC) 40 MG capsule Take 1 capsule by mouth Daily. 3/26/20 10/5/21 Yes Prem Aldridge MD   sodium-potassium-magnesium sulfates (Suprep Bowel Prep Kit) 17.5-3.13-1.6 GM/177ML solution oral solution Take 1 bottle by mouth Every 12 (Twelve) Hours. 8/25/21 10/5/21  Paulina Guzmán APRN     Allergies   Allergen Reactions   • Ibuprofen Anaphylaxis   • Phenergan [Promethazine] Other (See Comments)     Pt states arms and legs jump uncontrollably      Social History     Socioeconomic History   • Marital status:      Spouse name: Not on file   • Number of children: Not on file   • Years of education: Not on file   • Highest education level: Not on file   Tobacco Use   • Smoking status: Never Smoker   • Smokeless tobacco: Never Used   Vaping Use   • Vaping Use: Never used   Substance and Sexual Activity   • Alcohol use: No   • Drug use: No   • Sexual activity: Defer  "      Review of Systems  Review of Systems   Constitutional: Positive for appetite change and fatigue. Negative for activity change, chills, diaphoresis, fever and unexpected weight change.   HENT: Negative for hoarse voice, sore throat and trouble swallowing.    Respiratory: Negative for cough, choking and shortness of breath.    Cardiovascular: Negative for chest pain.   Gastrointestinal: Positive for abdominal pain, heartburn and nausea. Negative for abdominal distention, anal bleeding, blood in stool, constipation, diarrhea, dysphagia, rectal pain and vomiting.   Musculoskeletal: Negative for arthralgias.   Skin: Negative for pallor.   Neurological: Negative for light-headedness.        /96 (BP Location: Right arm)   Pulse 74   Ht 167.6 cm (66\")   Wt (!) 140 kg (308 lb)   BMI 49.71 kg/m²     Objective    Physical Exam  Constitutional:       General: She is not in acute distress.     Appearance: Normal appearance. She is normal weight. She is not ill-appearing.   HENT:      Head: Normocephalic and atraumatic.   Pulmonary:      Effort: Pulmonary effort is normal.   Abdominal:      General: Bowel sounds are normal. There is no distension.      Palpations: Abdomen is soft. There is no mass.      Tenderness: There is abdominal tenderness in the epigastric area.   Neurological:      Mental Status: She is alert.       Lab on 10/04/2021   Component Date Value Ref Range Status   • Iron 10/04/2021 37  37 - 145 mcg/dL Final   • Iron Saturation 10/04/2021 10* 20 - 50 % Final   • Transferrin 10/04/2021 237  200 - 360 mg/dL Final   • TIBC 10/04/2021 353  298 - 536 mcg/dL Final   • Ferritin 10/04/2021 74.62  13.00 - 150.00 ng/mL Final   • Folate 10/04/2021 5.98  4.78 - 24.20 ng/mL Final   • Vitamin B-12 10/04/2021 349  211 - 946 pg/mL Final   • WBC 10/04/2021 8.45  3.40 - 10.80 10*3/mm3 Final   • RBC 10/04/2021 4.50  3.77 - 5.28 10*6/mm3 Final   • Hemoglobin 10/04/2021 11.8* 12.0 - 15.9 g/dL Final   • Hematocrit " 10/04/2021 36.7  34.0 - 46.6 % Final   • MCV 10/04/2021 81.6  79.0 - 97.0 fL Final   • MCH 10/04/2021 26.2* 26.6 - 33.0 pg Final   • MCHC 10/04/2021 32.2  31.5 - 35.7 g/dL Final   • RDW 10/04/2021 14.9  12.3 - 15.4 % Final   • RDW-SD 10/04/2021 43.8  37.0 - 54.0 fl Final   • MPV 10/04/2021 9.6  6.0 - 12.0 fL Final   • Platelets 10/04/2021 433  140 - 450 10*3/mm3 Final   • Neutrophil % 10/04/2021 53.7  42.7 - 76.0 % Final   • Lymphocyte % 10/04/2021 34.8  19.6 - 45.3 % Final   • Monocyte % 10/04/2021 7.8  5.0 - 12.0 % Final   • Eosinophil % 10/04/2021 2.8  0.3 - 6.2 % Final   • Basophil % 10/04/2021 0.7  0.0 - 1.5 % Final   • Immature Grans % 10/04/2021 0.2  0.0 - 0.5 % Final   • Neutrophils, Absolute 10/04/2021 4.53  1.70 - 7.00 10*3/mm3 Final   • Lymphocytes, Absolute 10/04/2021 2.94  0.70 - 3.10 10*3/mm3 Final   • Monocytes, Absolute 10/04/2021 0.66  0.10 - 0.90 10*3/mm3 Final   • Eosinophils, Absolute 10/04/2021 0.24  0.00 - 0.40 10*3/mm3 Final   • Basophils, Absolute 10/04/2021 0.06  0.00 - 0.20 10*3/mm3 Final   • Immature Grans, Absolute 10/04/2021 0.02  0.00 - 0.05 10*3/mm3 Final   • nRBC 10/04/2021 0.0  0.0 - 0.2 /100 WBC Final     Assessment/Plan      1. Gastroesophageal reflux disease with esophagitis without hemorrhage    2. Chronic gastritis without bleeding, unspecified gastritis type    3. Cecal polyp    .   Discontinue Prilosec start patient on Protonix recommend she avoid gastric irritants follow standard antireflux measures and discussed importance of weight loss   Repeat colonoscopy in 1 year due to suboptimal prep and cecal polyp.    Orders placed during this encounter include:  No orders of the defined types were placed in this encounter.      * Surgery not found *    Review and/or summary of lab tests, radiology, procedures, medications. Review and summary of old records and obtaining of history. The risks and benefits of my recommendations, as well as other treatment options were discussed with  the patient today. Questions were answered.    New Medications Ordered This Visit   Medications   • pantoprazole (PROTONIX) 40 MG EC tablet     Sig: Take 1 tablet by mouth Daily.     Dispense:  30 tablet     Refill:  5       Follow-up: Return in about 8 weeks (around 11/30/2021) for Recheck.          This document has been electronically signed by PORTIA Montelongo on October 5, 2021 13:26 CDT           I spent 18 minutes caring for Michelle on this date of service. This time includes time spent by me in the following activities:preparing for the visit, reviewing tests, obtaining and/or reviewing a separately obtained history, performing a medically appropriate examination and/or evaluation , counseling and educating the patient/family/caregiver, ordering medications, tests, or procedures, referring and communicating with other health care professionals , documenting information in the medical record and care coordination    Results for orders placed or performed in visit on 10/04/21   CBC Auto Differential    Specimen: Blood   Result Value Ref Range    WBC 8.45 3.40 - 10.80 10*3/mm3    RBC 4.50 3.77 - 5.28 10*6/mm3    Hemoglobin 11.8 (L) 12.0 - 15.9 g/dL    Hematocrit 36.7 34.0 - 46.6 %    MCV 81.6 79.0 - 97.0 fL    MCH 26.2 (L) 26.6 - 33.0 pg    MCHC 32.2 31.5 - 35.7 g/dL    RDW 14.9 12.3 - 15.4 %    RDW-SD 43.8 37.0 - 54.0 fl    MPV 9.6 6.0 - 12.0 fL    Platelets 433 140 - 450 10*3/mm3    Neutrophil % 53.7 42.7 - 76.0 %    Lymphocyte % 34.8 19.6 - 45.3 %    Monocyte % 7.8 5.0 - 12.0 %    Eosinophil % 2.8 0.3 - 6.2 %    Basophil % 0.7 0.0 - 1.5 %    Immature Grans % 0.2 0.0 - 0.5 %    Neutrophils, Absolute 4.53 1.70 - 7.00 10*3/mm3    Lymphocytes, Absolute 2.94 0.70 - 3.10 10*3/mm3    Monocytes, Absolute 0.66 0.10 - 0.90 10*3/mm3    Eosinophils, Absolute 0.24 0.00 - 0.40 10*3/mm3    Basophils, Absolute 0.06 0.00 - 0.20 10*3/mm3    Immature Grans, Absolute 0.02 0.00 - 0.05 10*3/mm3    nRBC 0.0 0.0 - 0.2 /100 WBC    Iron and TIBC    Specimen: Blood   Result Value Ref Range    Iron 37 37 - 145 mcg/dL    Iron Saturation 10 (L) 20 - 50 %    Transferrin 237 200 - 360 mg/dL    TIBC 353 298 - 536 mcg/dL   Folate    Specimen: Blood   Result Value Ref Range    Folate 5.98 4.78 - 24.20 ng/mL   Ferritin    Specimen: Blood   Result Value Ref Range    Ferritin 74.62 13.00 - 150.00 ng/mL   Vitamin B12    Specimen: Blood   Result Value Ref Range    Vitamin B-12 349 211 - 946 pg/mL   Results for orders placed or performed during the hospital encounter of 09/28/21   Tissue Pathology Exam    Specimen: A: Small Intestine, Duodenum; Tissue    B: Gastric, Antrum; Tissue    C: Esophagus; Tissue    D: Large Intestine, Cecum; Tissue   Result Value Ref Range    Case Report       Surgical Pathology Report                         Case: [id number]                                  Authorizing Provider:  Michelle Lemus MD      Collected:           09/28/2021 02:13 PM          [facility]   Received:            09/29/2021 06:53 AM                                 [place]                                                     Pathologist:           Ady Olivera MD                                                           Specimens:   1) - Small Intestine, Duodenum, JA                                                                  2) - Gastric, Antrum, JA                                                                            3) - Esophagus, EGJ   JA                                                                            4) - Large Intestine, Cecum, polyp   JA                                                    Final Diagnosis       SEE SCANNED REPORT       Results for orders placed or performed in visit on 09/07/21   COVID-19, [facility], NP SWAB IN TRANSPORT MEDIA 8-10 HR TAT - Swab, Nasopharynx    Specimen: Nasopharynx; Swab   Result Value Ref Range    COVID19 Detected (C) Not  Detected - Ref. Range   Results for orders placed or performed in visit on 08/23/21   C-reactive Protein    Specimen: Blood   Result Value Ref Range    C-Reactive Protein 1.86 (H) 0.00 - 0.50 mg/dL   Comprehensive Metabolic Panel    Specimen: Blood   Result Value Ref Range    Glucose 103 (H) 65 - 99 mg/dL    BUN 15 6 - 20 mg/dL    Creatinine 1.13 (H) 0.57 - 1.00 mg/dL    Sodium 141 136 - 145 mmol/L    Potassium 4.2 3.5 - 5.2 mmol/L    Chloride 106 98 - 107 mmol/L    CO2 25.0 22.0 - 29.0 mmol/L    Calcium 9.1 8.6 - 10.5 mg/dL    Total Protein 7.4 6.0 - 8.5 g/dL    Albumin 3.90 3.50 - 5.20 g/dL    ALT (SGPT) 14 1 - 33 U/L    AST (SGOT) 16 1 - 32 U/L    Alkaline Phosphatase 88 39 - 117 U/L    Total Bilirubin 0.2 0.0 - 1.2 mg/dL    eGFR Non African Amer 52 (L) >60 mL/min/1.73    Globulin 3.5 gm/dL    A/G Ratio 1.1 g/dL    BUN/Creatinine Ratio 13.3 7.0 - 25.0    Anion Gap 10.0 5.0 - 15.0 mmol/L   Results for orders placed or performed in visit on 08/23/21   CBC Auto Differential    Specimen: Blood   Result Value Ref Range    WBC 9.28 3.40 - 10.80 10*3/mm3    RBC 4.33 3.77 - 5.28 10*6/mm3    Hemoglobin 11.2 (L) 12.0 - 15.9 g/dL    Hematocrit 36.2 34.0 - 46.6 %    MCV 83.6 79.0 - 97.0 fL    MCH 25.9 (L) 26.6 - 33.0 pg    MCHC 30.9 (L) 31.5 - 35.7 g/dL    RDW 16.3 (H) 12.3 - 15.4 %    RDW-SD 49.2 37.0 - 54.0 fl    MPV 10.3 6.0 - 12.0 fL    Platelets 433 140 - 450 10*3/mm3    Neutrophil % 55.7 42.7 - 76.0 %    Lymphocyte % 33.3 19.6 - 45.3 %    Monocyte % 7.9 5.0 - 12.0 %    Eosinophil % 2.3 0.3 - 6.2 %    Basophil % 0.6 0.0 - 1.5 %    Immature Grans % 0.2 0.0 - 0.5 %    Neutrophils, Absolute 5.17 1.70 - 7.00 10*3/mm3    Lymphocytes, Absolute 3.09 0.70 - 3.10 10*3/mm3    Monocytes, Absolute 0.73 0.10 - 0.90 10*3/mm3    Eosinophils, Absolute 0.21 0.00 - 0.40 10*3/mm3    Basophils, Absolute 0.06 0.00 - 0.20 10*3/mm3    Immature Grans, Absolute 0.02 0.00 - 0.05 10*3/mm3    nRBC 0.0 0.0 - 0.2 /100 WBC   Results for orders placed  or performed in visit on 08/18/21   Liquid-based Pap Smear, Screening    Specimen: Cervix; ThinPrep Vial   Result Value Ref Range    Case Report       Gynecologic Cytology Report                       Case: [id number]                                  Authorizing Provider:  Belia Lerner APRN   Collected:           08/18/2021 03:00 PM          Ordering Location:     [facility]     Received:            08/19/2021 07:39 AM                                 [facility]                                                                 First Screen:          Den Decker MD                                                             Specimen:    Sendout to [facility], Cervix                                                                     Interpretation See attached report    Results for orders placed or performed in visit on 08/02/21   Gold Top - SST   Result Value Ref Range    Extra Tube Hold for add-ons.    Retic With IRF & RET-He    Specimen: Blood   Result Value Ref Range    Immature Reticulocyte Fraction 15.6 3.0 - 15.8 %    Reticulocyte % 1.46 0.70 - 1.90 %    Reticulocyte Absolute 0.0656 0.0200 - 0.1300 10*6/mm3    Reticulocyte Hgb 31.8 29.8 - 36.1 pg   CBC Auto Differential    Specimen: Blood   Result Value Ref Range    WBC 10.24 3.40 - 10.80 10*3/mm3    RBC 4.49 3.77 - 5.28 10*6/mm3    Hemoglobin 11.5 (L) 12.0 - 15.9 g/dL    Hematocrit 36.7 34.0 - 46.6 %    MCV 81.7 79.0 - 97.0 fL    MCH 25.6 (L) 26.6 - 33.0 pg    MCHC 31.3 (L) 31.5 - 35.7 g/dL    RDW 15.9 (H) 12.3 - 15.4 %    RDW-SD 47.2 37.0 - 54.0 fl    MPV 9.5 6.0 - 12.0 fL    Platelets 506 (H) 140 - 450 10*3/mm3    Neutrophil % 58.8 42.7 - 76.0 %    Lymphocyte % 30.3 19.6 - 45.3 %    Monocyte % 7.6 5.0 - 12.0 %    Eosinophil % 2.5 0.3 - 6.2 %    Basophil % 0.5 0.0 - 1.5 %    Immature Grans % 0.3 0.0 - 0.5 %    Neutrophils, Absolute 6.02 1.70 - 7.00 10*3/mm3    Lymphocytes, Absolute 3.10 0.70 - 3.10 10*3/mm3    Monocytes, Absolute 0.78 0.10 -  0.90 10*3/mm3    Eosinophils, Absolute 0.26 0.00 - 0.40 10*3/mm3    Basophils, Absolute 0.05 0.00 - 0.20 10*3/mm3    Immature Grans, Absolute 0.03 0.00 - 0.05 10*3/mm3    nRBC 0.0 0.0 - 0.2 /100 WBC     *Note: Due to a large number of results and/or encounters for the requested time period, some results have not been displayed. A complete set of results can be found in Results Review.

## 2021-10-05 NOTE — TELEPHONE ENCOUNTER
----- Message from Bg Segura MD sent at 10/5/2021  2:30 PM CDT -----  Please let patient know, her iron level is still low.  Recommend intravenous Injectafer to be started next week.  Recommend continuing with B12 and folic acid p.o. daily.  Thank you

## 2021-10-06 ENCOUNTER — TELEPHONE (OUTPATIENT)
Dept: ONCOLOGY | Facility: HOSPITAL | Age: 46
End: 2021-10-06

## 2021-10-13 ENCOUNTER — OFFICE VISIT (OUTPATIENT)
Dept: OBSTETRICS AND GYNECOLOGY | Facility: CLINIC | Age: 46
End: 2021-10-13

## 2021-10-13 VITALS
WEIGHT: 293 LBS | SYSTOLIC BLOOD PRESSURE: 140 MMHG | BODY MASS INDEX: 47.09 KG/M2 | HEIGHT: 66 IN | DIASTOLIC BLOOD PRESSURE: 98 MMHG

## 2021-10-13 DIAGNOSIS — N99.85 POST ENDOMETRIAL ABLATION SYNDROME: ICD-10-CM

## 2021-10-13 DIAGNOSIS — R10.2 PELVIC PAIN: Primary | ICD-10-CM

## 2021-10-13 PROCEDURE — 99213 OFFICE O/P EST LOW 20 MIN: CPT | Performed by: OBSTETRICS & GYNECOLOGY

## 2021-10-13 RX ORDER — CEFAZOLIN SODIUM IN 0.9 % NACL 3 G/100 ML
3 INTRAVENOUS SOLUTION, PIGGYBACK (ML) INTRAVENOUS ONCE
Status: CANCELLED | OUTPATIENT
Start: 2021-11-30 | End: 2021-10-13

## 2021-10-13 RX ORDER — SODIUM CHLORIDE 0.9 % (FLUSH) 0.9 %
3 SYRINGE (ML) INJECTION EVERY 12 HOURS SCHEDULED
Status: CANCELLED | OUTPATIENT
Start: 2021-11-30

## 2021-10-13 RX ORDER — SODIUM CHLORIDE 0.9 % (FLUSH) 0.9 %
10 SYRINGE (ML) INJECTION AS NEEDED
Status: CANCELLED | OUTPATIENT
Start: 2021-11-30

## 2021-10-13 RX ORDER — SODIUM CHLORIDE, SODIUM LACTATE, POTASSIUM CHLORIDE, CALCIUM CHLORIDE 600; 310; 30; 20 MG/100ML; MG/100ML; MG/100ML; MG/100ML
125 INJECTION, SOLUTION INTRAVENOUS CONTINUOUS
Status: CANCELLED | OUTPATIENT
Start: 2021-11-30

## 2021-10-13 NOTE — PROGRESS NOTES
Michelle Lange is a 46 y.o. y/o female.     Chief Complaint: Follow-up pelvic pain    HPI:   46 y.o. No obstetric history on file..  No LMP recorded. Patient has had an ablation..  Patient seen for pelvic pain was worked up originally thought possibly degenerating fibroid.  I reviewed MRI Dr. 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  Patient been worked up by GI for anemia which is I understand it was negative and is being treated with intravenous iron.  She is asking about hysterectomy which were discussed in the past I told her I think she is a high risk candidate for hysterectomy hysterectomy is going to take away her pain and she needs to understand this.  I went over the risk benefits alternatives to a hysterectomy at length and she adamantly wishes to proceed with this.  She has had the ablation and she has had 1 .  After reviewing the risk benefits alternatives were gone plan to proceed with LAVH possible BSO possible WHITNEY with possible BSO possible cystoscopy          Review of Systems     Constitutional: Denies night sweats    HENT: No hearing changes, denies ear pain    Eye: No eye pain; no foreign body in eye    Pulmonary: No hemoptysis    Cardiovascular: No claudication    GI: No hematemesis    Musculoskeletal: No arthralgias, no joint swelling    Endocrine: No polydipsia or polyuria    Hematologic: Denies any free bleeding    Psychiatric: Denies any delusions    The following portions of the patient's history were reviewed and updated as appropriate: allergies, current medications, past family history, past medical history, past social history, past surgical history and problem list.    Allergies   Allergen Reactions   • Ibuprofen Anaphylaxis   • Phenergan [Promethazine] Other (See Comments)     Pt states arms and legs jump uncontrollably         Outpatient Medications Prior to Visit   Medication Sig Dispense Refill   • albuterol  (PROVENTIL HFA;VENTOLIN HFA) 108 (90 Base) MCG/ACT inhaler Inhale 2 puffs Every 4 (Four) Hours As Needed for Wheezing.     • amLODIPine (NORVASC) 10 MG tablet      • buPROPion XL (WELLBUTRIN XL) 150 MG 24 hr tablet      • butalbital-acetaminophen-caffeine (FIORICET, ESGIC) -40 MG per tablet Take 1 tablet by mouth.     • cyanocobalamin (VITAMIN B-12) 1000 MCG tablet Take 1 tablet by mouth Daily. 90 tablet 1   • ergocalciferol (ERGOCALCIFEROL) 1.25 MG (03394 UT) capsule Take 50,000 Units by mouth.     • folic acid (FOLVITE) 1 MG tablet Take 1 tablet by mouth Daily. 90 tablet 1   • gabapentin (NEURONTIN) 600 MG tablet Take 300 mg by mouth 4 (Four) Times a Day.     • hydroCHLOROthiazide (HYDRODIURIL) 12.5 MG tablet      • irbesartan (AVAPRO) 150 MG tablet Take 150 mg by mouth.     • pantoprazole (PROTONIX) 40 MG EC tablet Take 1 tablet by mouth Daily. 30 tablet 5     No facility-administered medications prior to visit.        The patient has a family history of   Family History   Problem Relation Age of Onset   • Diabetes Other    • Heart disease Other    • Hypertension Other    • Stroke Other    • Seizures Other    • Heart disease Mother    • Hypertension Mother    • Diabetes Father    • Heart disease Father    • Hypertension Father    • Osteoporosis Father    • Diabetes Maternal Grandmother    • Hypertension Maternal Grandmother    • Diabetes Paternal Grandmother    • Heart disease Paternal Grandmother    • Hypertension Paternal Grandmother    • Osteoporosis Paternal Grandmother         Past Medical History:   Diagnosis Date   • Asthma    • Excessive or frequent menstruation    • GERD (gastroesophageal reflux disease)    • History of mammogram 02/24/2016   • Hypertension    • Otitis media    • Otorrhea    • Plantar fasciitis         OB History    No obstetric history on file.          Social History     Socioeconomic History   • Marital status:    Tobacco Use   • Smoking status: Never Smoker   • Smokeless  "tobacco: Never Used   Vaping Use   • Vaping Use: Never used   Substance and Sexual Activity   • Alcohol use: No   • Drug use: No   • Sexual activity: Defer        Past Surgical History:   Procedure Laterality Date   •  SECTION     • CHOLECYSTECTOMY     • COLONOSCOPY N/A 2021    Procedure: COLONOSCOPY;  Surgeon: Michelle Lemus MD;  Location: [facility] ENDOSCOPY;  Service: Gastroenterology;  Laterality: N/A;   • ENDOSCOPY N/A 2021    Procedure: ESOPHAGOGASTRODUODENOSCOPY;  Surgeon: Michelle Lemus MD;  Location: [facility] ENDOSCOPY;  Service: Gastroenterology;  Laterality: N/A;   • GASTRIC SLEEVE LAPAROSCOPIC     • INJECTION OF MEDICATION  2015    Kenalog (1)     • KNEE SURGERY Right    • OTHER SURGICAL HISTORY  2014    Hysteroscope procedure (1)    Exam under anesthesia, diagnostic hysteroscopy with fractional dilation and curettage and NovaSure endometrial ablation.    • TUBAL ABDOMINAL LIGATION     • UPPER GASTROINTESTINAL ENDOSCOPY  2021        Patient Active Problem List   Diagnosis   • Acute pain of both knees   • Primary osteoarthritis of both knees   • Chondromalacia of both patellae   • Constipation   • Anemia   • Thrombocytosis   • Adverse effect of iron   • Lower abdominal pain        Documented Vitals    10/13/21 1332   BP: 140/98   Weight: (!) 141 kg (311 lb 9.6 oz)   Height: 167.6 cm (66\")   PainSc:   5   PainLoc: Abdomen        Body mass index is 50.29 kg/m².    Physical Exam  Constitutional: Appears to be in no acute distress; Eyes: Sclerae normal; Endocrine system: Thyroid palpation is normal; Pulmonary system: Lungs clear; Cardiovascular system: Heart regular rate and rhythm; Gastrointestinal system: Abdomen soft and nontender, active bowel sounds; Urologic system: CVA negative; Psychiatric: Appropriate insight; Neurologic: Gait within normal limits     Laboratory Data:   Lab Results - Last 18 Months   Lab Units 21  1610 21  1531 21  1447 "   GLUCOSE mg/dL 103* 116*  --    BUN mg/dL 15 17 24   CREATININE mg/dL 1.13* 1.15* 1.2   SODIUM mmol/L 141 141 138   POTASSIUM mmol/L 4.2 4.2 4.1   CHLORIDE mmol/L 106 106 106   TOTAL CO2 mmol/L  --   --  25   CO2 mmol/L 25.0 28.0  --    CALCIUM mg/dL 9.1 8.6 8.5*   TOTAL PROTEIN g/dL 7.4  --   --    ALBUMIN g/dL 3.90  --  3.7   ALT (SGPT) U/L 14  --  12   AST (SGOT) U/L 16  --  15   ALK PHOS U/L 88  --  93   BILIRUBIN mg/dL 0.2  --  0.24*   EGFR IF NONAFRICN AM mL/min/1.73 52* 51*  --    GLOBULIN gm/dL 3.5  --   --    A/G RATIO g/dL 1.1  --   --    BUN / CREAT RATIO  13.3 14.8  --    ANION GAP mmol/L 10.0 7.0  --      Lab Results - Last 18 Months   Lab Units 10/04/21  1344 08/23/21  1610 08/02/21  1541 07/27/21  1532   WBC 10*3/mm3 8.45 9.28 10.24 10.68   RBC 10*6/mm3 4.50 4.33 4.49 4.34   HEMOGLOBIN g/dL 11.8* 11.2* 11.5* 11.2*   HEMATOCRIT % 36.7 36.2 36.7 35.4   MCV fL 81.6 83.6 81.7 81.6   MCH pg 26.2* 25.9* 25.6* 25.8*   MCHC g/dL 32.2 30.9* 31.3* 31.6   RDW % 14.9 16.3* 15.9* 15.9*   RDW-SD fl 43.8 49.2 47.2 47.5   MPV fL 9.6 10.3 9.5 9.4   PLATELETS 10*3/mm3 433 433 506* 460*     Lab Results - Last 18 Months   Lab Units 07/27/21  1721   HCG QUALITATIVE  Negative       Assessment        Diagnosis Plan   1. Pelvic pain   after reviewing the risk benefits alternatives we will plan for LAVH with possible BSO possible WHITNEY with possible BSO   2. Post endometrial ablation syndrome           Plan       No orders of the defined types were placed in this encounter.            This document has been electronically signed by Johnathan Younger MD on October 13, 2021 16:55 CDT    Please note that portions of this note were completed with a voice recognition program.

## 2021-10-15 ENCOUNTER — INFUSION (OUTPATIENT)
Dept: ONCOLOGY | Facility: HOSPITAL | Age: 46
End: 2021-10-15

## 2021-10-15 VITALS
SYSTOLIC BLOOD PRESSURE: 147 MMHG | RESPIRATION RATE: 18 BRPM | DIASTOLIC BLOOD PRESSURE: 95 MMHG | HEART RATE: 68 BPM | TEMPERATURE: 97.4 F

## 2021-10-15 DIAGNOSIS — D50.0 IRON DEFICIENCY ANEMIA DUE TO CHRONIC BLOOD LOSS: ICD-10-CM

## 2021-10-15 DIAGNOSIS — T45.4X5A ADVERSE EFFECT OF IRON, INITIAL ENCOUNTER: Primary | ICD-10-CM

## 2021-10-15 PROCEDURE — 25010000002 FERRIC CARBOXYMALTOSE 750 MG/15ML SOLUTION 15 ML VIAL: Performed by: INTERNAL MEDICINE

## 2021-10-15 PROCEDURE — 96374 THER/PROPH/DIAG INJ IV PUSH: CPT | Performed by: INTERNAL MEDICINE

## 2021-10-15 PROCEDURE — 63710000001 PROCHLORPERAZINE MALEATE PER 10 MG: Performed by: INTERNAL MEDICINE

## 2021-10-15 PROCEDURE — 63710000001 DIPHENHYDRAMINE PER 50 MG: Performed by: INTERNAL MEDICINE

## 2021-10-15 RX ORDER — ACETAMINOPHEN 325 MG/1
650 TABLET ORAL ONCE
Status: CANCELLED | OUTPATIENT
Start: 2021-10-22

## 2021-10-15 RX ORDER — SODIUM CHLORIDE 9 MG/ML
250 INJECTION, SOLUTION INTRAVENOUS ONCE
Status: CANCELLED | OUTPATIENT
Start: 2021-10-22 | End: 2021-10-22

## 2021-10-15 RX ORDER — PROCHLORPERAZINE MALEATE 10 MG
10 TABLET ORAL ONCE
Status: CANCELLED | OUTPATIENT
Start: 2021-10-22 | End: 2021-10-22

## 2021-10-15 RX ORDER — DIPHENHYDRAMINE HCL 25 MG
25 CAPSULE ORAL ONCE
Status: COMPLETED | OUTPATIENT
Start: 2021-10-15 | End: 2021-10-15

## 2021-10-15 RX ORDER — SODIUM CHLORIDE 9 MG/ML
250 INJECTION, SOLUTION INTRAVENOUS ONCE
Status: COMPLETED | OUTPATIENT
Start: 2021-10-15 | End: 2021-10-15

## 2021-10-15 RX ORDER — DIPHENHYDRAMINE HYDROCHLORIDE 50 MG/ML
50 INJECTION INTRAMUSCULAR; INTRAVENOUS AS NEEDED
Status: CANCELLED | OUTPATIENT
Start: 2021-10-22

## 2021-10-15 RX ORDER — ACETAMINOPHEN 325 MG/1
650 TABLET ORAL ONCE
Status: COMPLETED | OUTPATIENT
Start: 2021-10-15 | End: 2021-10-15

## 2021-10-15 RX ORDER — PROCHLORPERAZINE MALEATE 10 MG
10 TABLET ORAL ONCE
Status: COMPLETED | OUTPATIENT
Start: 2021-10-15 | End: 2021-10-15

## 2021-10-15 RX ORDER — METHYLPREDNISOLONE SODIUM SUCCINATE 125 MG/2ML
125 INJECTION, POWDER, LYOPHILIZED, FOR SOLUTION INTRAMUSCULAR; INTRAVENOUS AS NEEDED
Status: CANCELLED | OUTPATIENT
Start: 2021-10-22

## 2021-10-15 RX ORDER — CETIRIZINE HYDROCHLORIDE 10 MG/1
10 TABLET ORAL ONCE
Status: COMPLETED | OUTPATIENT
Start: 2021-10-15 | End: 2021-10-15

## 2021-10-15 RX ORDER — DIPHENHYDRAMINE HCL 25 MG
25 CAPSULE ORAL ONCE
Status: CANCELLED | OUTPATIENT
Start: 2021-10-22

## 2021-10-15 RX ORDER — CETIRIZINE HYDROCHLORIDE 10 MG/1
10 TABLET ORAL ONCE
Status: CANCELLED | OUTPATIENT
Start: 2021-10-22 | End: 2021-10-22

## 2021-10-15 RX ADMIN — PROCHLORPERAZINE MALEATE 10 MG: 10 TABLET ORAL at 14:24

## 2021-10-15 RX ADMIN — CETIRIZINE HYDROCHLORIDE 10 MG: 10 TABLET, FILM COATED ORAL at 14:24

## 2021-10-15 RX ADMIN — ACETAMINOPHEN 650 MG: 325 TABLET, FILM COATED ORAL at 14:24

## 2021-10-15 RX ADMIN — DIPHENHYDRAMINE HYDROCHLORIDE 25 MG: 25 CAPSULE ORAL at 14:24

## 2021-10-15 RX ADMIN — FERRIC CARBOXYMALTOSE INJECTION 750 MG: 50 INJECTION, SOLUTION INTRAVENOUS at 14:55

## 2021-10-15 RX ADMIN — SODIUM CHLORIDE 250 ML: 9 INJECTION, SOLUTION INTRAVENOUS at 14:24

## 2021-10-22 ENCOUNTER — INFUSION (OUTPATIENT)
Dept: ONCOLOGY | Facility: HOSPITAL | Age: 46
End: 2021-10-22

## 2021-10-22 VITALS
SYSTOLIC BLOOD PRESSURE: 165 MMHG | TEMPERATURE: 97.6 F | RESPIRATION RATE: 16 BRPM | HEART RATE: 68 BPM | DIASTOLIC BLOOD PRESSURE: 89 MMHG

## 2021-10-22 DIAGNOSIS — D50.0 IRON DEFICIENCY ANEMIA DUE TO CHRONIC BLOOD LOSS: ICD-10-CM

## 2021-10-22 DIAGNOSIS — T45.4X5A ADVERSE EFFECT OF IRON, INITIAL ENCOUNTER: Primary | ICD-10-CM

## 2021-10-22 PROCEDURE — 96374 THER/PROPH/DIAG INJ IV PUSH: CPT | Performed by: INTERNAL MEDICINE

## 2021-10-22 PROCEDURE — 25010000002 FERRIC CARBOXYMALTOSE 750 MG/15ML SOLUTION 15 ML VIAL: Performed by: INTERNAL MEDICINE

## 2021-10-22 PROCEDURE — 63710000001 DIPHENHYDRAMINE PER 50 MG: Performed by: INTERNAL MEDICINE

## 2021-10-22 PROCEDURE — 63710000001 PROCHLORPERAZINE MALEATE PER 10 MG: Performed by: INTERNAL MEDICINE

## 2021-10-22 RX ORDER — METHYLPREDNISOLONE SODIUM SUCCINATE 125 MG/2ML
125 INJECTION, POWDER, LYOPHILIZED, FOR SOLUTION INTRAMUSCULAR; INTRAVENOUS AS NEEDED
Status: DISCONTINUED | OUTPATIENT
Start: 2021-10-22 | End: 2021-10-22 | Stop reason: HOSPADM

## 2021-10-22 RX ORDER — SODIUM CHLORIDE 9 MG/ML
250 INJECTION, SOLUTION INTRAVENOUS ONCE
Status: CANCELLED | OUTPATIENT
Start: 2021-10-29 | End: 2021-10-29

## 2021-10-22 RX ORDER — DIPHENHYDRAMINE HYDROCHLORIDE 50 MG/ML
50 INJECTION INTRAMUSCULAR; INTRAVENOUS AS NEEDED
Status: DISCONTINUED | OUTPATIENT
Start: 2021-10-22 | End: 2021-10-22 | Stop reason: HOSPADM

## 2021-10-22 RX ORDER — DIPHENHYDRAMINE HYDROCHLORIDE 50 MG/ML
50 INJECTION INTRAMUSCULAR; INTRAVENOUS AS NEEDED
Status: CANCELLED | OUTPATIENT
Start: 2021-10-29

## 2021-10-22 RX ORDER — DIPHENHYDRAMINE HCL 25 MG
25 CAPSULE ORAL ONCE
Status: CANCELLED | OUTPATIENT
Start: 2021-10-29

## 2021-10-22 RX ORDER — METHYLPREDNISOLONE SODIUM SUCCINATE 125 MG/2ML
125 INJECTION, POWDER, LYOPHILIZED, FOR SOLUTION INTRAMUSCULAR; INTRAVENOUS AS NEEDED
Status: CANCELLED | OUTPATIENT
Start: 2021-10-29

## 2021-10-22 RX ORDER — PROCHLORPERAZINE MALEATE 10 MG
10 TABLET ORAL ONCE
Status: COMPLETED | OUTPATIENT
Start: 2021-10-22 | End: 2021-10-22

## 2021-10-22 RX ORDER — PROCHLORPERAZINE MALEATE 10 MG
10 TABLET ORAL ONCE
Status: CANCELLED | OUTPATIENT
Start: 2021-10-29 | End: 2021-10-29

## 2021-10-22 RX ORDER — CETIRIZINE HYDROCHLORIDE 10 MG/1
10 TABLET ORAL ONCE
Status: CANCELLED | OUTPATIENT
Start: 2021-10-29 | End: 2021-10-29

## 2021-10-22 RX ORDER — ACETAMINOPHEN 325 MG/1
650 TABLET ORAL ONCE
Status: CANCELLED | OUTPATIENT
Start: 2021-10-29

## 2021-10-22 RX ORDER — CETIRIZINE HYDROCHLORIDE 10 MG/1
10 TABLET ORAL ONCE
Status: COMPLETED | OUTPATIENT
Start: 2021-10-22 | End: 2021-10-22

## 2021-10-22 RX ORDER — DIPHENHYDRAMINE HCL 25 MG
25 CAPSULE ORAL ONCE
Status: COMPLETED | OUTPATIENT
Start: 2021-10-22 | End: 2021-10-22

## 2021-10-22 RX ORDER — ACETAMINOPHEN 325 MG/1
650 TABLET ORAL ONCE
Status: COMPLETED | OUTPATIENT
Start: 2021-10-22 | End: 2021-10-22

## 2021-10-22 RX ORDER — SODIUM CHLORIDE 9 MG/ML
250 INJECTION, SOLUTION INTRAVENOUS ONCE
Status: DISCONTINUED | OUTPATIENT
Start: 2021-10-22 | End: 2021-10-22 | Stop reason: HOSPADM

## 2021-10-22 RX ADMIN — PROCHLORPERAZINE MALEATE 10 MG: 10 TABLET ORAL at 13:53

## 2021-10-22 RX ADMIN — ACETAMINOPHEN 650 MG: 325 TABLET, FILM COATED ORAL at 13:53

## 2021-10-22 RX ADMIN — DIPHENHYDRAMINE HYDROCHLORIDE 25 MG: 25 CAPSULE ORAL at 13:53

## 2021-10-22 RX ADMIN — FERRIC CARBOXYMALTOSE INJECTION 750 MG: 50 INJECTION, SOLUTION INTRAVENOUS at 14:23

## 2021-10-22 RX ADMIN — CETIRIZINE HYDROCHLORIDE 10 MG: 10 TABLET, FILM COATED ORAL at 13:53

## 2021-11-02 PROBLEM — N99.85 POST ENDOMETRIAL ABLATION SYNDROME: Status: ACTIVE | Noted: 2021-11-02

## 2021-11-10 ENCOUNTER — OFFICE VISIT (OUTPATIENT)
Dept: OBSTETRICS AND GYNECOLOGY | Facility: CLINIC | Age: 46
End: 2021-11-10

## 2021-11-10 VITALS
HEIGHT: 66 IN | DIASTOLIC BLOOD PRESSURE: 82 MMHG | BODY MASS INDEX: 47.09 KG/M2 | SYSTOLIC BLOOD PRESSURE: 130 MMHG | WEIGHT: 293 LBS

## 2021-11-10 DIAGNOSIS — N99.85 POST ENDOMETRIAL ABLATION SYNDROME: ICD-10-CM

## 2021-11-10 DIAGNOSIS — R10.2 PELVIC PAIN: Primary | ICD-10-CM

## 2021-11-10 PROCEDURE — 99214 OFFICE O/P EST MOD 30 MIN: CPT | Performed by: OBSTETRICS & GYNECOLOGY

## 2021-11-10 RX ORDER — TIZANIDINE 4 MG/1
4 TABLET ORAL EVERY 8 HOURS PRN
COMMUNITY
Start: 2021-10-25

## 2021-11-11 NOTE — PROGRESS NOTES
Chief complaint female pelvic pain likely post ablation syndrome    Decision please see H&P dictated for hospital decision for surgery

## 2021-11-11 NOTE — H&P
History and Physical    Michelle Lange is a 46 y.o. y/o female.     Chief Complaint: I thought about things I am sure I want to have a hysterectomy I understand the risk    HPI:   46 y.o. No obstetric history on file..  No LMP recorded. Patient has had an ablation..  Patient has pelvic pain that is severe and has had relatively acute onset in about August.  She had a work-up that included an ultrasound at [facility] and the MRI.  At first consideration to degenerate degenerating fibroid but on the MRI which I think is more sensitive or specific it looks like a post ablation syndrome with likely retained blood.  This is more consistent with the physical findings.  I discussed options with the patient referral for a possible repeat ablation attempts at medical therapy though they are fairly limited hysterectomy.  I have explained to the patient she is at very high risk for complications secondary to habitus and her history of ablation.  We are going to try for a laparoscopic approach but she is at very high risk for conversion to an abdominal approach because of her morbid obesity will be at high risk for problems with wound healing. Patient requests laparoscopic hysterectomy. She understands the risk up to and including death. She understands the risk of serious urologic morbidity such as vesico vaginal fistula, damage to the ureter possible nephrectomy and/or extended morbidity. She understands the risk of damage to bowel possible colostomy. She understands the risk of damage to bowel undetected at time of procedure with sepsis and/or need returned OR. She understands the risk of damage to blood vessels with hemorrhage to require blood transfusion and its risk. She understands the risk of delayed hemorrhage possible need to return. She understands the risk of hematoma formation. She understands the risk infection in the abdominal wall and in the pelvis and abdomen and other parts of the body. She  understands the alternatives of medical therapy and the risks and alternatives. Her questions are answered at length. She understands that there is a significant possibility that we may need to convert this to a total abdominal hysterectomy and she accepts this.    She is certain she wants bilateral salpingooophorectomy and understands that this will place her through surgical menopause. Patient requests bilateral salpingo-oophorectomy after reviewing the risks, benefits, and alternatives at length. She understands that this will place her through surgical menopause and the implications of this. She understands that there is some work to indicate that bilateral salpingo-oophorectomy may increase mortality. She understands the risk of ovarian remnant syndrome. She understands that bilateral salpingo-oophorectomy does not completely remove the risk of ovarian like cancer in the peritoneal cavity. She understands the risk of bleeding and damage to the ureter associated with removal of the ovaries. She understands the alternatives of only removing the fallopian tubes and the advantages and risk associated with this.    Patient understands her may be an adjunctive cystoscopy done and its risk of cystotomy or damage to ureters       Review of Systems     Constitutional: Denies night sweats    HENT: No hearing changes, denies ear pain    Eye: No eye pain; no foreign body in eye    Pulmonary: No hemoptysis    Cardiovascular: No claudication    GI: No hematemesis    Musculoskeletal: No arthralgias, no joint swelling    Endocrine: No polydipsia or polyuria    Hematologic: Denies any free bleeding    Psychiatric: Denies any delusions      The following portions of the patient's history were reviewed and updated as appropriate: allergies, current medications, past family history, past medical history, past social history, past surgical history and problem list.    Allergies   Allergen Reactions   • Ibuprofen Anaphylaxis   •  Phenergan [Promethazine] Other (See Comments)     Pt states arms and legs jump uncontrollably         Prior to Admission medications    Medication Sig Start Date End Date Taking? Authorizing Provider   albuterol (PROVENTIL HFA;VENTOLIN HFA) 108 (90 Base) MCG/ACT inhaler Inhale 2 puffs Every 4 (Four) Hours As Needed for Wheezing.   Yes Emergency, Nurse Luma RN   amLODIPine (NORVASC) 10 MG tablet  4/13/21  Yes Weston Rose MD   buPROPion XL (WELLBUTRIN XL) 150 MG 24 hr tablet  5/12/21  Yes ProviderWeston MD   butalbital-acetaminophen-caffeine (FIORICET, ESGIC) -40 MG per tablet Take 1 tablet by mouth. 7/10/20  Yes Nurse Luma Espinoza RN   cyanocobalamin (VITAMIN B-12) 1000 MCG tablet Take 1 tablet by mouth Daily. 8/3/21  Yes Bg Segura MD   ergocalciferol (ERGOCALCIFEROL) 1.25 MG (22573 UT) capsule Take 50,000 Units by mouth. 7/9/21 7/10/22 Yes ProviderWeston MD   folic acid (FOLVITE) 1 MG tablet Take 1 tablet by mouth Daily. 8/3/21  Yes Bg Segura MD   gabapentin (NEURONTIN) 600 MG tablet Take 300 mg by mouth 4 (Four) Times a Day. 7/10/20  Yes Nurse Luma Espinoza RN   hydroCHLOROthiazide (HYDRODIURIL) 12.5 MG tablet  4/13/21  Yes Weston Rose MD   irbesartan (AVAPRO) 150 MG tablet Take 150 mg by mouth. 7/10/20  Yes Emergency, Nurse Luma RN   pantoprazole (PROTONIX) 40 MG EC tablet Take 1 tablet by mouth Daily. 10/5/21  Yes Paulina Guzmán APRN   tiZANidine (ZANAFLEX) 4 MG tablet  10/25/21  Yes ProviderWeston MD       The patient has a family history of   Family History   Problem Relation Age of Onset   • Diabetes Other    • Heart disease Other    • Hypertension Other    • Stroke Other    • Seizures Other    • Heart disease Mother    • Hypertension Mother    • Diabetes Father    • Heart disease Father    • Hypertension Father    • Osteoporosis Father    • Diabetes Maternal Grandmother    • Hypertension Maternal Grandmother    • Diabetes Paternal Grandmother     • Heart disease Paternal Grandmother    • Hypertension Paternal Grandmother    • Osteoporosis Paternal Grandmother         Past Medical History:   Diagnosis Date   • Asthma    • Excessive or frequent menstruation    • GERD (gastroesophageal reflux disease)    • History of mammogram 2016   • Hypertension    • Otitis media    • Otorrhea    • Plantar fasciitis         OB History    No obstetric history on file.          Social History     Socioeconomic History   • Marital status:    Tobacco Use   • Smoking status: Never Smoker   • Smokeless tobacco: Never Used   Vaping Use   • Vaping Use: Never used   Substance and Sexual Activity   • Alcohol use: No   • Drug use: No   • Sexual activity: Defer        Past Surgical History:   Procedure Laterality Date   •  SECTION     • CHOLECYSTECTOMY     • COLONOSCOPY N/A 2021    Procedure: COLONOSCOPY;  Surgeon: Michelle Lemus MD;  Location: [facility] ENDOSCOPY;  Service: Gastroenterology;  Laterality: N/A;   • ENDOSCOPY N/A 2021    Procedure: ESOPHAGOGASTRODUODENOSCOPY;  Surgeon: Michelle Lemus MD;  Location: [facility] ENDOSCOPY;  Service: Gastroenterology;  Laterality: N/A;   • GASTRIC SLEEVE LAPAROSCOPIC     • INJECTION OF MEDICATION  2015    Kenalog (1)     • KNEE SURGERY Right    • OTHER SURGICAL HISTORY  2014    Hysteroscope procedure (1)    Exam under anesthesia, diagnostic hysteroscopy with fractional dilation and curettage and NovaSure endometrial ablation.    • TUBAL ABDOMINAL LIGATION     • UPPER GASTROINTESTINAL ENDOSCOPY  2021        Patient Active Problem List   Diagnosis   • Acute pain of both knees   • Primary osteoarthritis of both knees   • Chondromalacia of both patellae   • Constipation   • Anemia   • Thrombocytosis   • Adverse effect of iron   • Lower abdominal pain   • Post endometrial ablation syndrome        Documented Vitals    11/10/21 1413   BP: 130/82   Weight: (!) 140 kg (309 lb 6.4 oz)   Height: 167.6  "cm (66\")        Body mass index is 49.94 kg/m².    Physical Exam  Constitutional: Appears to be in no acute distress; Eyes: Sclera normal; Endocrine system: Thyroid palpate is normal; Pulmonary system: Lungs clear; Cardiovascular system: Heart regular rate and rhythm; Gastrointestinal system: Abdomen soft, nontender, active bowel sounds; Urologic system: CVA negative; Psychiatric: Appropriate insight; Neurologic: Gait within normal limits.      Last Labs  Lab Results   Component Value Date    WBC 8.45 10/04/2021    RBC 4.50 10/04/2021    HGB 11.8 (L) 10/04/2021    HCT 36.7 10/04/2021    MCV 81.6 10/04/2021    MCH 26.2 (L) 10/04/2021    MCHC 32.2 10/04/2021    RDW 14.9 10/04/2021    RDWSD 43.8 10/04/2021    MPV 9.6 10/04/2021     10/04/2021        Lab Results   Component Value Date    GLUCOSE 103 (H) 08/23/2021    BUN 15 08/23/2021    CREATININE 1.13 (H) 08/23/2021     08/23/2021    K 4.2 08/23/2021     08/23/2021    CO2 25.0 08/23/2021    CALCIUM 9.1 08/23/2021    PROTEINTOT 7.4 08/23/2021    ALBUMIN 3.90 08/23/2021    ALT 14 08/23/2021    AST 16 08/23/2021    ALKPHOS 88 08/23/2021    BILITOT 0.2 08/23/2021    EGFRIFNONA 52 (L) 08/23/2021    GLOB 3.5 08/23/2021    AGRATIO 1.1 08/23/2021    BCR 13.3 08/23/2021    ANIONGAP 10.0 08/23/2021       Lab Results   Component Value Date    HCGQUAL Negative 07/27/2021       Assessment &Plan: Patient with pelvic pain felt on work-up including MRI to likely be secondary to post ablation syndrome after reviewing risk benefits alternatives were gone plan for LAVH with at patient's request BSO possible WHITNEY possible BSO possible cystoscopy she understands she is at high risk for complications because of morbid obesity and surgical history      Plan of care has been reviewed with staff, patient and family.  Risks, benefits of treatment plan have been discussed.  All questions have been answered.     Michelle Lange and I have discussed pain goals for this " hospitalization after reviewing her current clinical condition, medical history and prior pain experiences.  The goal is to keep her pain level 3.  To help achieve this, I plan to multimodal pain management.        This document has been electronically signed by Johnathan Younger MD on November 10, 2021 19:51 CST    Please note that portions of this note were completed with a voice recognition program.

## 2021-11-16 RX ORDER — DEXLANSOPRAZOLE 60 MG/1
60 CAPSULE, DELAYED RELEASE ORAL DAILY
Qty: 30 CAPSULE | Refills: 5 | Status: SHIPPED | OUTPATIENT
Start: 2021-11-16 | End: 2022-05-15

## 2021-11-29 ENCOUNTER — PRE-ADMISSION TESTING (OUTPATIENT)
Dept: PREADMISSION TESTING | Facility: HOSPITAL | Age: 46
End: 2021-11-29

## 2021-11-29 ENCOUNTER — ANESTHESIA EVENT (OUTPATIENT)
Dept: PERIOP | Facility: HOSPITAL | Age: 46
End: 2021-11-29

## 2021-11-29 VITALS
DIASTOLIC BLOOD PRESSURE: 88 MMHG | WEIGHT: 293 LBS | HEIGHT: 66 IN | HEART RATE: 78 BPM | OXYGEN SATURATION: 98 % | RESPIRATION RATE: 18 BRPM | SYSTOLIC BLOOD PRESSURE: 142 MMHG | BODY MASS INDEX: 47.09 KG/M2

## 2021-11-29 DIAGNOSIS — N99.85 POST ENDOMETRIAL ABLATION SYNDROME: ICD-10-CM

## 2021-11-29 LAB
ABO GROUP BLD: NORMAL
ALBUMIN SERPL-MCNC: 4.2 G/DL (ref 3.5–5.2)
ALBUMIN/GLOB SERPL: 1 G/DL
ALP SERPL-CCNC: 113 U/L (ref 39–117)
ALT SERPL W P-5'-P-CCNC: 18 U/L (ref 1–33)
ANION GAP SERPL CALCULATED.3IONS-SCNC: 10 MMOL/L (ref 5–15)
AST SERPL-CCNC: 14 U/L (ref 1–32)
BASOPHILS # BLD AUTO: 0.04 10*3/MM3 (ref 0–0.2)
BASOPHILS NFR BLD AUTO: 0.5 % (ref 0–1.5)
BILIRUB SERPL-MCNC: 0.3 MG/DL (ref 0–1.2)
BLD GP AB SCN SERPL QL: NEGATIVE
BUN SERPL-MCNC: 17 MG/DL (ref 6–20)
BUN/CREAT SERPL: 16 (ref 7–25)
CALCIUM SPEC-SCNC: 9.5 MG/DL (ref 8.6–10.5)
CHLORIDE SERPL-SCNC: 101 MMOL/L (ref 98–107)
CO2 SERPL-SCNC: 28 MMOL/L (ref 22–29)
CREAT SERPL-MCNC: 1.06 MG/DL (ref 0.57–1)
DEPRECATED RDW RBC AUTO: 50.4 FL (ref 37–54)
EOSINOPHIL # BLD AUTO: 0.19 10*3/MM3 (ref 0–0.4)
EOSINOPHIL NFR BLD AUTO: 2.5 % (ref 0.3–6.2)
ERYTHROCYTE [DISTWIDTH] IN BLOOD BY AUTOMATED COUNT: 16.4 % (ref 12.3–15.4)
GFR SERPL CREATININE-BSD FRML MDRD: 56 ML/MIN/1.73
GLOBULIN UR ELPH-MCNC: 4.3 GM/DL
GLUCOSE SERPL-MCNC: 90 MG/DL (ref 65–99)
HCT VFR BLD AUTO: 41.1 % (ref 34–46.6)
HGB BLD-MCNC: 13.2 G/DL (ref 12–15.9)
IMM GRANULOCYTES # BLD AUTO: 0.04 10*3/MM3 (ref 0–0.05)
IMM GRANULOCYTES NFR BLD AUTO: 0.5 % (ref 0–0.5)
LYMPHOCYTES # BLD AUTO: 2.02 10*3/MM3 (ref 0.7–3.1)
LYMPHOCYTES NFR BLD AUTO: 26.1 % (ref 19.6–45.3)
Lab: NORMAL
MCH RBC QN AUTO: 27 PG (ref 26.6–33)
MCHC RBC AUTO-ENTMCNC: 32.1 G/DL (ref 31.5–35.7)
MCV RBC AUTO: 84 FL (ref 79–97)
MONOCYTES # BLD AUTO: 0.75 10*3/MM3 (ref 0.1–0.9)
MONOCYTES NFR BLD AUTO: 9.7 % (ref 5–12)
NEUTROPHILS NFR BLD AUTO: 4.71 10*3/MM3 (ref 1.7–7)
NEUTROPHILS NFR BLD AUTO: 60.7 % (ref 42.7–76)
NRBC BLD AUTO-RTO: 0 /100 WBC (ref 0–0.2)
PLATELET # BLD AUTO: 425 10*3/MM3 (ref 140–450)
PMV BLD AUTO: 9.9 FL (ref 6–12)
POTASSIUM SERPL-SCNC: 3.9 MMOL/L (ref 3.5–5.2)
PROT SERPL-MCNC: 8.5 G/DL (ref 6–8.5)
RBC # BLD AUTO: 4.89 10*6/MM3 (ref 3.77–5.28)
RH BLD: POSITIVE
SODIUM SERPL-SCNC: 139 MMOL/L (ref 136–145)
T&S EXPIRATION DATE: NORMAL
WBC NRBC COR # BLD: 7.75 10*3/MM3 (ref 3.4–10.8)

## 2021-11-29 PROCEDURE — 86900 BLOOD TYPING SEROLOGIC ABO: CPT

## 2021-11-29 PROCEDURE — 93005 ELECTROCARDIOGRAM TRACING: CPT

## 2021-11-29 PROCEDURE — 36415 COLL VENOUS BLD VENIPUNCTURE: CPT

## 2021-11-29 PROCEDURE — 85025 COMPLETE CBC W/AUTO DIFF WBC: CPT

## 2021-11-29 PROCEDURE — 93010 ELECTROCARDIOGRAM REPORT: CPT | Performed by: INTERNAL MEDICINE

## 2021-11-29 PROCEDURE — 86850 RBC ANTIBODY SCREEN: CPT

## 2021-11-29 PROCEDURE — 86901 BLOOD TYPING SEROLOGIC RH(D): CPT

## 2021-11-29 PROCEDURE — 80053 COMPREHEN METABOLIC PANEL: CPT

## 2021-11-29 NOTE — ANESTHESIA PREPROCEDURE EVALUATION
Anesthesia Evaluation     Patient summary reviewed and Nursing notes reviewed   no history of anesthetic complications:  NPO Solid Status: > 8 hours  NPO Liquid Status: > 2 hours           Airway   Mallampati: II  TM distance: >3 FB  Neck ROM: full  possible difficult intubation, Large neck circumference and Small opening  Dental    (+) poor dentation    Pulmonary    (+) asthma,decreased breath sounds,   (-) shortness of breath, sleep apnea, not a smoker    ROS comment: snores  PE comment: Albuterol treatment ordered pre-op.  Cardiovascular - normal exam  Exercise tolerance: poor (<4 METS)    ECG reviewed  Rhythm: regular  Rate: normal    (+) hypertension well controlled 2 medications or greater,   (-) valvular problems/murmurs, dysrhythmias, angina, GARLAND, murmur, carotid bruits, cardiac stents, DVT, hyperlipidemia    ROS comment: Normal sinus rhythm  Voltage criteria for left ventricular hypertrophy  Nonspecific T wave abnormality  Prolonged QT  Abnormal ECG  When compared with ECG of 22-JUL-2013 12:54,  Fusion complexes are no longer Present  Premature ventricular complexes are no longer Present  Nonspecific T wave abnormality now evident in Anterolateral leads  QT has lengthened    Neuro/Psych  (+) headaches (Migraines weekly), numbness (left anterior thigh and right sciatic), psychiatric history Anxiety and Depression,     (-) seizures, TIA, CVA  GI/Hepatic/Renal/Endo    (+) morbid obesity, GERD well controlled,  renal disease CRI,   (-) hepatitis, liver disease, diabetes    ROS Comment: Previous gastric sleeve procedure.    Musculoskeletal     (+) arthralgias,   Abdominal   (+) obese,    Substance History - negative use     OB/GYN negative ob/gyn ROS         Other   arthritis (hands, knees, ankles and toes),      ROS/Med Hx Other: HGB 13.2  Post endometrial ablation syndrome                Anesthesia Plan    ASA 4     general   (Perez available  Possible arterial line due to habitus)  intravenous induction      Anesthetic plan, all risks, benefits, and alternatives have been provided, discussed and informed consent has been obtained with: patient and spouse/significant other.  Use of blood products discussed with patient  Consented to blood products.

## 2021-11-30 ENCOUNTER — ANESTHESIA (OUTPATIENT)
Dept: PERIOP | Facility: HOSPITAL | Age: 46
End: 2021-11-30

## 2021-11-30 ENCOUNTER — HOSPITAL ENCOUNTER (OUTPATIENT)
Facility: HOSPITAL | Age: 46
Discharge: HOME | End: 2021-12-01
Attending: OBSTETRICS & GYNECOLOGY | Admitting: OBSTETRICS & GYNECOLOGY

## 2021-11-30 DIAGNOSIS — Z90.710 S/P LAPAROSCOPIC ASSISTED VAGINAL HYSTERECTOMY (LAVH): Primary | ICD-10-CM

## 2021-11-30 DIAGNOSIS — N99.85 POST ENDOMETRIAL ABLATION SYNDROME: ICD-10-CM

## 2021-11-30 LAB
ABO GROUP BLD: NORMAL
B-HCG UR QL: NEGATIVE
BLD GP AB SCN SERPL QL: NEGATIVE
Lab: NORMAL
Lab: NORMAL
QT INTERVAL: 520 MS
QTC INTERVAL: 557 MS
RH BLD: POSITIVE
T&S EXPIRATION DATE: NORMAL

## 2021-11-30 PROCEDURE — 25010000002 HYDROMORPHONE 1 MG/ML SOLUTION: Performed by: OBSTETRICS & GYNECOLOGY

## 2021-11-30 PROCEDURE — 25010000002 NEOSTIGMINE 10 MG/10ML SOLUTION: Performed by: NURSE ANESTHETIST, CERTIFIED REGISTERED

## 2021-11-30 PROCEDURE — 25010000002 PROPOFOL 10 MG/ML EMULSION: Performed by: NURSE ANESTHETIST, CERTIFIED REGISTERED

## 2021-11-30 PROCEDURE — 58571 TLH W/T/O 250 G OR LESS: CPT | Performed by: OBSTETRICS & GYNECOLOGY

## 2021-11-30 PROCEDURE — 86850 RBC ANTIBODY SCREEN: CPT | Performed by: OBSTETRICS & GYNECOLOGY

## 2021-11-30 PROCEDURE — 25010000002 ONDANSETRON PER 1 MG: Performed by: OBSTETRICS & GYNECOLOGY

## 2021-11-30 PROCEDURE — 25010000002 HYDROMORPHONE PER 4 MG: Performed by: NURSE ANESTHETIST, CERTIFIED REGISTERED

## 2021-11-30 PROCEDURE — 81025 URINE PREGNANCY TEST: CPT | Performed by: OBSTETRICS & GYNECOLOGY

## 2021-11-30 PROCEDURE — C1889 IMPLANT/INSERT DEVICE, NOC: HCPCS | Performed by: OBSTETRICS & GYNECOLOGY

## 2021-11-30 PROCEDURE — 25010000002 SUCCINYLCHOLINE PER 20 MG: Performed by: NURSE ANESTHETIST, CERTIFIED REGISTERED

## 2021-11-30 PROCEDURE — 25010000002 CEFAZOLIN PER 500 MG: Performed by: OBSTETRICS & GYNECOLOGY

## 2021-11-30 PROCEDURE — 25010000002 DEXAMETHASONE PER 1 MG: Performed by: NURSE ANESTHETIST, CERTIFIED REGISTERED

## 2021-11-30 PROCEDURE — 25010000002 MIDAZOLAM PER 1 MG: Performed by: NURSE ANESTHETIST, CERTIFIED REGISTERED

## 2021-11-30 PROCEDURE — 25010000002 FENTANYL CITRATE (PF) 50 MCG/ML SOLUTION: Performed by: NURSE ANESTHETIST, CERTIFIED REGISTERED

## 2021-11-30 PROCEDURE — 86901 BLOOD TYPING SEROLOGIC RH(D): CPT | Performed by: OBSTETRICS & GYNECOLOGY

## 2021-11-30 PROCEDURE — 94799 UNLISTED PULMONARY SVC/PX: CPT

## 2021-11-30 PROCEDURE — 0 MEPERIDINE PER 100 MG: Performed by: NURSE ANESTHETIST, CERTIFIED REGISTERED

## 2021-11-30 PROCEDURE — 25010000002 ONDANSETRON PER 1 MG: Performed by: NURSE ANESTHETIST, CERTIFIED REGISTERED

## 2021-11-30 PROCEDURE — 86900 BLOOD TYPING SEROLOGIC ABO: CPT | Performed by: OBSTETRICS & GYNECOLOGY

## 2021-11-30 PROCEDURE — 25010000002 HYDROMORPHONE 1 MG/ML SOLUTION: Performed by: NURSE ANESTHETIST, CERTIFIED REGISTERED

## 2021-11-30 DEVICE — HEMOST ABS SURGICEL PWDR 3GM: Type: IMPLANTABLE DEVICE | Site: ABDOMEN | Status: FUNCTIONAL

## 2021-11-30 DEVICE — ABSORBABLE RELOAD
Type: IMPLANTABLE DEVICE | Site: ABDOMEN | Status: FUNCTIONAL
Brand: V-LOC 180

## 2021-11-30 RX ORDER — SODIUM CHLORIDE 0.9 % (FLUSH) 0.9 %
10 SYRINGE (ML) INJECTION AS NEEDED
Status: DISCONTINUED | OUTPATIENT
Start: 2021-11-30 | End: 2021-11-30 | Stop reason: HOSPADM

## 2021-11-30 RX ORDER — LIDOCAINE HYDROCHLORIDE AND EPINEPHRINE 10; 10 MG/ML; UG/ML
INJECTION, SOLUTION INFILTRATION; PERINEURAL AS NEEDED
Status: DISCONTINUED | OUTPATIENT
Start: 2021-11-30 | End: 2021-11-30 | Stop reason: HOSPADM

## 2021-11-30 RX ORDER — PANTOPRAZOLE SODIUM 40 MG/1
40 TABLET, DELAYED RELEASE ORAL NIGHTLY
Status: DISCONTINUED | OUTPATIENT
Start: 2021-11-30 | End: 2021-12-01 | Stop reason: HOSPADM

## 2021-11-30 RX ORDER — SODIUM CHLORIDE, SODIUM GLUCONATE, SODIUM ACETATE, POTASSIUM CHLORIDE AND MAGNESIUM CHLORIDE 526; 502; 368; 37; 30 MG/100ML; MG/100ML; MG/100ML; MG/100ML; MG/100ML
INJECTION, SOLUTION INTRAVENOUS CONTINUOUS PRN
Status: DISCONTINUED | OUTPATIENT
Start: 2021-11-30 | End: 2021-11-30 | Stop reason: SURG

## 2021-11-30 RX ORDER — CEFAZOLIN SODIUM IN 0.9 % NACL 3 G/100 ML
3 INTRAVENOUS SOLUTION, PIGGYBACK (ML) INTRAVENOUS ONCE
Status: COMPLETED | OUTPATIENT
Start: 2021-11-30 | End: 2021-11-30

## 2021-11-30 RX ORDER — NALOXONE HCL 0.4 MG/ML
0.1 VIAL (ML) INJECTION
Status: DISCONTINUED | OUTPATIENT
Start: 2021-11-30 | End: 2021-12-01 | Stop reason: HOSPADM

## 2021-11-30 RX ORDER — TIZANIDINE 4 MG/1
4 TABLET ORAL EVERY 8 HOURS SCHEDULED
Status: DISCONTINUED | OUTPATIENT
Start: 2021-11-30 | End: 2021-12-01 | Stop reason: HOSPADM

## 2021-11-30 RX ORDER — OXYCODONE HYDROCHLORIDE 5 MG/1
5 TABLET ORAL EVERY 4 HOURS PRN
Status: DISCONTINUED | OUTPATIENT
Start: 2021-11-30 | End: 2021-12-01 | Stop reason: HOSPADM

## 2021-11-30 RX ORDER — FLUMAZENIL 0.1 MG/ML
0.2 INJECTION INTRAVENOUS AS NEEDED
Status: DISCONTINUED | OUTPATIENT
Start: 2021-11-30 | End: 2021-11-30 | Stop reason: HOSPADM

## 2021-11-30 RX ORDER — SODIUM CHLORIDE, SODIUM LACTATE, POTASSIUM CHLORIDE, CALCIUM CHLORIDE 600; 310; 30; 20 MG/100ML; MG/100ML; MG/100ML; MG/100ML
125 INJECTION, SOLUTION INTRAVENOUS CONTINUOUS
Status: DISCONTINUED | OUTPATIENT
Start: 2021-11-30 | End: 2021-11-30

## 2021-11-30 RX ORDER — ONDANSETRON 2 MG/ML
INJECTION INTRAMUSCULAR; INTRAVENOUS AS NEEDED
Status: DISCONTINUED | OUTPATIENT
Start: 2021-11-30 | End: 2021-11-30 | Stop reason: SURG

## 2021-11-30 RX ORDER — ONDANSETRON 2 MG/ML
4 INJECTION INTRAMUSCULAR; INTRAVENOUS ONCE AS NEEDED
Status: DISCONTINUED | OUTPATIENT
Start: 2021-11-30 | End: 2021-11-30 | Stop reason: HOSPADM

## 2021-11-30 RX ORDER — SUCCINYLCHOLINE CHLORIDE 20 MG/ML
INJECTION INTRAMUSCULAR; INTRAVENOUS AS NEEDED
Status: DISCONTINUED | OUTPATIENT
Start: 2021-11-30 | End: 2021-11-30 | Stop reason: SURG

## 2021-11-30 RX ORDER — PROPOFOL 10 MG/ML
VIAL (ML) INTRAVENOUS AS NEEDED
Status: DISCONTINUED | OUTPATIENT
Start: 2021-11-30 | End: 2021-11-30 | Stop reason: SURG

## 2021-11-30 RX ORDER — LABETALOL HYDROCHLORIDE 5 MG/ML
20-80 INJECTION, SOLUTION INTRAVENOUS
Status: ACTIVE | OUTPATIENT
Start: 2021-11-30 | End: 2021-12-01

## 2021-11-30 RX ORDER — NALOXONE HCL 0.4 MG/ML
0.4 VIAL (ML) INJECTION AS NEEDED
Status: DISCONTINUED | OUTPATIENT
Start: 2021-11-30 | End: 2021-11-30 | Stop reason: HOSPADM

## 2021-11-30 RX ORDER — AMLODIPINE BESYLATE 10 MG/1
10 TABLET ORAL DAILY
Status: DISCONTINUED | OUTPATIENT
Start: 2021-11-30 | End: 2021-12-01 | Stop reason: HOSPADM

## 2021-11-30 RX ORDER — DEXAMETHASONE SODIUM PHOSPHATE 4 MG/ML
INJECTION, SOLUTION INTRA-ARTICULAR; INTRALESIONAL; INTRAMUSCULAR; INTRAVENOUS; SOFT TISSUE AS NEEDED
Status: DISCONTINUED | OUTPATIENT
Start: 2021-11-30 | End: 2021-11-30 | Stop reason: SURG

## 2021-11-30 RX ORDER — DIPHENHYDRAMINE HYDROCHLORIDE 50 MG/ML
12.5 INJECTION INTRAMUSCULAR; INTRAVENOUS
Status: DISCONTINUED | OUTPATIENT
Start: 2021-11-30 | End: 2021-11-30 | Stop reason: HOSPADM

## 2021-11-30 RX ORDER — GABAPENTIN 400 MG/1
400 CAPSULE ORAL EVERY 8 HOURS SCHEDULED
Status: DISCONTINUED | OUTPATIENT
Start: 2021-11-30 | End: 2021-12-01 | Stop reason: HOSPADM

## 2021-11-30 RX ORDER — ONDANSETRON 2 MG/ML
4 INJECTION INTRAMUSCULAR; INTRAVENOUS EVERY 6 HOURS PRN
Status: DISCONTINUED | OUTPATIENT
Start: 2021-11-30 | End: 2021-12-01 | Stop reason: HOSPADM

## 2021-11-30 RX ORDER — BUPROPION HYDROCHLORIDE 150 MG/1
150 TABLET ORAL NIGHTLY
Status: DISCONTINUED | OUTPATIENT
Start: 2021-11-30 | End: 2021-12-01 | Stop reason: HOSPADM

## 2021-11-30 RX ORDER — MEPERIDINE HYDROCHLORIDE 25 MG/ML
12.5 INJECTION INTRAMUSCULAR; INTRAVENOUS; SUBCUTANEOUS
Status: DISCONTINUED | OUTPATIENT
Start: 2021-11-30 | End: 2021-11-30 | Stop reason: HOSPADM

## 2021-11-30 RX ORDER — LIDOCAINE HYDROCHLORIDE 20 MG/ML
INJECTION, SOLUTION INFILTRATION; PERINEURAL AS NEEDED
Status: DISCONTINUED | OUTPATIENT
Start: 2021-11-30 | End: 2021-11-30 | Stop reason: SURG

## 2021-11-30 RX ORDER — HYDROMORPHONE HCL 110MG/55ML
PATIENT CONTROLLED ANALGESIA SYRINGE INTRAVENOUS AS NEEDED
Status: DISCONTINUED | OUTPATIENT
Start: 2021-11-30 | End: 2021-11-30 | Stop reason: SURG

## 2021-11-30 RX ORDER — EPHEDRINE SULFATE 50 MG/ML
5 INJECTION, SOLUTION INTRAVENOUS ONCE AS NEEDED
Status: DISCONTINUED | OUTPATIENT
Start: 2021-11-30 | End: 2021-11-30 | Stop reason: HOSPADM

## 2021-11-30 RX ORDER — SODIUM CHLORIDE, SODIUM LACTATE, POTASSIUM CHLORIDE, CALCIUM CHLORIDE 600; 310; 30; 20 MG/100ML; MG/100ML; MG/100ML; MG/100ML
150 INJECTION, SOLUTION INTRAVENOUS CONTINUOUS
Status: DISCONTINUED | OUTPATIENT
Start: 2021-11-30 | End: 2021-12-01

## 2021-11-30 RX ORDER — ALBUTEROL SULFATE 2.5 MG/3ML
2.5 SOLUTION RESPIRATORY (INHALATION) EVERY 4 HOURS PRN
Status: DISCONTINUED | OUTPATIENT
Start: 2021-11-30 | End: 2021-12-01 | Stop reason: HOSPADM

## 2021-11-30 RX ORDER — MIDAZOLAM HYDROCHLORIDE 1 MG/ML
INJECTION INTRAMUSCULAR; INTRAVENOUS AS NEEDED
Status: DISCONTINUED | OUTPATIENT
Start: 2021-11-30 | End: 2021-11-30 | Stop reason: SURG

## 2021-11-30 RX ORDER — SODIUM CHLORIDE, SODIUM LACTATE, POTASSIUM CHLORIDE, CALCIUM CHLORIDE 600; 310; 30; 20 MG/100ML; MG/100ML; MG/100ML; MG/100ML
INJECTION, SOLUTION INTRAVENOUS CONTINUOUS PRN
Status: DISCONTINUED | OUTPATIENT
Start: 2021-11-30 | End: 2021-11-30 | Stop reason: SURG

## 2021-11-30 RX ORDER — ALBUTEROL SULFATE 2.5 MG/3ML
2.5 SOLUTION RESPIRATORY (INHALATION) ONCE
Status: COMPLETED | OUTPATIENT
Start: 2021-11-30 | End: 2021-11-30

## 2021-11-30 RX ORDER — BISACODYL 10 MG
10 SUPPOSITORY, RECTAL RECTAL DAILY PRN
Status: DISCONTINUED | OUTPATIENT
Start: 2021-11-30 | End: 2021-12-01 | Stop reason: HOSPADM

## 2021-11-30 RX ORDER — SODIUM CHLORIDE 0.9 % (FLUSH) 0.9 %
3 SYRINGE (ML) INJECTION EVERY 12 HOURS SCHEDULED
Status: DISCONTINUED | OUTPATIENT
Start: 2021-11-30 | End: 2021-11-30 | Stop reason: HOSPADM

## 2021-11-30 RX ORDER — ALBUTEROL SULFATE 90 UG/1
2 AEROSOL, METERED RESPIRATORY (INHALATION) EVERY 4 HOURS PRN
Status: DISCONTINUED | OUTPATIENT
Start: 2021-11-30 | End: 2021-11-30 | Stop reason: ALTCHOICE

## 2021-11-30 RX ORDER — NEOSTIGMINE METHYLSULFATE 1 MG/ML
INJECTION, SOLUTION INTRAVENOUS AS NEEDED
Status: DISCONTINUED | OUTPATIENT
Start: 2021-11-30 | End: 2021-11-30 | Stop reason: SURG

## 2021-11-30 RX ORDER — ACETAMINOPHEN 500 MG
1000 TABLET ORAL EVERY 8 HOURS
Status: DISCONTINUED | OUTPATIENT
Start: 2021-11-30 | End: 2021-12-01 | Stop reason: HOSPADM

## 2021-11-30 RX ORDER — POLYETHYLENE GLYCOL 3350 17 G/17G
17 POWDER, FOR SOLUTION ORAL DAILY
Status: DISCONTINUED | OUTPATIENT
Start: 2021-11-30 | End: 2021-12-01 | Stop reason: HOSPADM

## 2021-11-30 RX ORDER — ROCURONIUM BROMIDE 10 MG/ML
INJECTION, SOLUTION INTRAVENOUS AS NEEDED
Status: DISCONTINUED | OUTPATIENT
Start: 2021-11-30 | End: 2021-11-30 | Stop reason: SURG

## 2021-11-30 RX ORDER — FENTANYL CITRATE 50 UG/ML
INJECTION, SOLUTION INTRAMUSCULAR; INTRAVENOUS AS NEEDED
Status: DISCONTINUED | OUTPATIENT
Start: 2021-11-30 | End: 2021-11-30 | Stop reason: SURG

## 2021-11-30 RX ORDER — ACETAMINOPHEN 325 MG/1
650 TABLET ORAL ONCE AS NEEDED
Status: DISCONTINUED | OUTPATIENT
Start: 2021-11-30 | End: 2021-11-30 | Stop reason: HOSPADM

## 2021-11-30 RX ORDER — ONDANSETRON 4 MG/1
4 TABLET, FILM COATED ORAL EVERY 6 HOURS PRN
Status: DISCONTINUED | OUTPATIENT
Start: 2021-11-30 | End: 2021-12-01 | Stop reason: HOSPADM

## 2021-11-30 RX ORDER — ACETAMINOPHEN 650 MG/1
650 SUPPOSITORY RECTAL ONCE AS NEEDED
Status: DISCONTINUED | OUTPATIENT
Start: 2021-11-30 | End: 2021-11-30 | Stop reason: HOSPADM

## 2021-11-30 RX ORDER — LANOLIN ALCOHOL/MO/W.PET/CERES
1000 CREAM (GRAM) TOPICAL DAILY
Status: DISCONTINUED | OUTPATIENT
Start: 2021-11-30 | End: 2021-12-01 | Stop reason: HOSPADM

## 2021-11-30 RX ORDER — BUTALBITAL, ACETAMINOPHEN AND CAFFEINE 50; 325; 40 MG/1; MG/1; MG/1
1 TABLET ORAL EVERY 4 HOURS PRN
Status: DISCONTINUED | OUTPATIENT
Start: 2021-11-30 | End: 2021-12-01 | Stop reason: HOSPADM

## 2021-11-30 RX ADMIN — HYDROMORPHONE HYDROCHLORIDE 0.5 MG: 2 INJECTION, SOLUTION INTRAMUSCULAR; INTRAVENOUS; SUBCUTANEOUS at 09:38

## 2021-11-30 RX ADMIN — HYDROMORPHONE HYDROCHLORIDE 0.5 MG: 2 INJECTION, SOLUTION INTRAMUSCULAR; INTRAVENOUS; SUBCUTANEOUS at 10:54

## 2021-11-30 RX ADMIN — PANTOPRAZOLE SODIUM 40 MG: 40 TABLET, DELAYED RELEASE ORAL at 22:24

## 2021-11-30 RX ADMIN — SODIUM CHLORIDE, SODIUM GLUCONATE, SODIUM ACETATE, POTASSIUM CHLORIDE AND MAGNESIUM CHLORIDE: 526; 502; 368; 37; 30 INJECTION, SOLUTION INTRAVENOUS at 07:32

## 2021-11-30 RX ADMIN — MEPERIDINE HYDROCHLORIDE 12.5 MG: 25 INJECTION, SOLUTION INTRAMUSCULAR; INTRAVENOUS; SUBCUTANEOUS at 11:42

## 2021-11-30 RX ADMIN — FENTANYL CITRATE 100 MCG: 50 INJECTION INTRAMUSCULAR; INTRAVENOUS at 07:14

## 2021-11-30 RX ADMIN — ROCURONIUM BROMIDE 50 MG: 10 INJECTION INTRAVENOUS at 07:50

## 2021-11-30 RX ADMIN — MIDAZOLAM HYDROCHLORIDE 2 MG: 1 INJECTION, SOLUTION INTRAMUSCULAR; INTRAVENOUS at 07:14

## 2021-11-30 RX ADMIN — ACETAMINOPHEN 1000 MG: 500 TABLET, FILM COATED ORAL at 22:01

## 2021-11-30 RX ADMIN — HYDROMORPHONE HYDROCHLORIDE 0.5 MG: 1 INJECTION, SOLUTION INTRAMUSCULAR; INTRAVENOUS; SUBCUTANEOUS at 13:37

## 2021-11-30 RX ADMIN — SUCCINYLCHOLINE CHLORIDE 200 MG: 20 INJECTION, SOLUTION INTRAMUSCULAR; INTRAVENOUS at 07:17

## 2021-11-30 RX ADMIN — GABAPENTIN 400 MG: 400 CAPSULE ORAL at 22:02

## 2021-11-30 RX ADMIN — BUPROPION HYDROCHLORIDE 150 MG: 150 TABLET, FILM COATED, EXTENDED RELEASE ORAL at 22:01

## 2021-11-30 RX ADMIN — ROCURONIUM BROMIDE 10 MG: 10 INJECTION INTRAVENOUS at 08:35

## 2021-11-30 RX ADMIN — SODIUM CHLORIDE, SODIUM GLUCONATE, SODIUM ACETATE, POTASSIUM CHLORIDE AND MAGNESIUM CHLORIDE: 526; 502; 368; 37; 30 INJECTION, SOLUTION INTRAVENOUS at 10:10

## 2021-11-30 RX ADMIN — ROCURONIUM BROMIDE 10 MG: 10 INJECTION INTRAVENOUS at 09:04

## 2021-11-30 RX ADMIN — FLUORESCEIN SODIUM 25 MG: 100 INJECTION INTRAVENOUS at 10:01

## 2021-11-30 RX ADMIN — DEXAMETHASONE SODIUM PHOSPHATE 8 MG: 4 INJECTION, SOLUTION INTRAMUSCULAR; INTRAVENOUS at 07:44

## 2021-11-30 RX ADMIN — ACETAMINOPHEN 1000 MG: 500 TABLET, FILM COATED ORAL at 13:37

## 2021-11-30 RX ADMIN — NEOSTIGMINE METHYLSULFATE 5 MG: 0.5 INJECTION INTRAVENOUS at 10:35

## 2021-11-30 RX ADMIN — ONDANSETRON 4 MG: 2 INJECTION INTRAMUSCULAR; INTRAVENOUS at 15:23

## 2021-11-30 RX ADMIN — HYDROMORPHONE HYDROCHLORIDE 0.5 MG: 2 INJECTION, SOLUTION INTRAMUSCULAR; INTRAVENOUS; SUBCUTANEOUS at 09:56

## 2021-11-30 RX ADMIN — ONDANSETRON 8 MG: 2 INJECTION INTRAMUSCULAR; INTRAVENOUS at 10:40

## 2021-11-30 RX ADMIN — FENTANYL CITRATE 50 MCG: 50 INJECTION INTRAMUSCULAR; INTRAVENOUS at 08:03

## 2021-11-30 RX ADMIN — GLYCOPYRROLATE 1 MG: 0.2 INJECTION, SOLUTION INTRAMUSCULAR; INTRAVITREAL at 10:35

## 2021-11-30 RX ADMIN — ROCURONIUM BROMIDE 10 MG: 10 INJECTION INTRAVENOUS at 09:37

## 2021-11-30 RX ADMIN — LIDOCAINE HYDROCHLORIDE 100 MG: 20 INJECTION, SOLUTION INFILTRATION; PERINEURAL at 07:16

## 2021-11-30 RX ADMIN — Medication 3 G: at 07:21

## 2021-11-30 RX ADMIN — OXYCODONE 5 MG: 5 TABLET ORAL at 20:43

## 2021-11-30 RX ADMIN — TIZANIDINE 4 MG: 4 TABLET ORAL at 22:02

## 2021-11-30 RX ADMIN — SODIUM CHLORIDE, POTASSIUM CHLORIDE, SODIUM LACTATE AND CALCIUM CHLORIDE 125 ML/HR: 600; 310; 30; 20 INJECTION, SOLUTION INTRAVENOUS at 06:12

## 2021-11-30 RX ADMIN — PROPOFOL 250 MG: 10 INJECTION, EMULSION INTRAVENOUS at 07:16

## 2021-11-30 RX ADMIN — SODIUM CHLORIDE, POTASSIUM CHLORIDE, SODIUM LACTATE AND CALCIUM CHLORIDE: 600; 310; 30; 20 INJECTION, SOLUTION INTRAVENOUS at 07:52

## 2021-11-30 RX ADMIN — HYDROMORPHONE HYDROCHLORIDE 0.5 MG: 2 INJECTION, SOLUTION INTRAMUSCULAR; INTRAVENOUS; SUBCUTANEOUS at 08:07

## 2021-11-30 RX ADMIN — ALBUTEROL SULFATE 2.5 MG: 2.5 SOLUTION RESPIRATORY (INHALATION) at 06:15

## 2021-11-30 RX ADMIN — HYDROMORPHONE HYDROCHLORIDE 0.5 MG: 1 INJECTION, SOLUTION INTRAMUSCULAR; INTRAVENOUS; SUBCUTANEOUS at 11:08

## 2021-11-30 RX ADMIN — FENTANYL CITRATE 50 MCG: 50 INJECTION INTRAMUSCULAR; INTRAVENOUS at 07:51

## 2021-11-30 RX ADMIN — SODIUM CHLORIDE, POTASSIUM CHLORIDE, SODIUM LACTATE AND CALCIUM CHLORIDE 150 ML/HR: 600; 310; 30; 20 INJECTION, SOLUTION INTRAVENOUS at 15:27

## 2021-11-30 RX ADMIN — SODIUM CHLORIDE, POTASSIUM CHLORIDE, SODIUM LACTATE AND CALCIUM CHLORIDE 150 ML/HR: 600; 310; 30; 20 INJECTION, SOLUTION INTRAVENOUS at 23:47

## 2021-11-30 NOTE — ANESTHESIA PROCEDURE NOTES
Airway  Urgency: elective    Date/Time: 11/30/2021 7:21 AM  Airway not difficult    General Information and Staff    Patient location during procedure: OR  CRNA: Katia Patel CRNA    Indications and Patient Condition  Indications for airway management: airway protection    Preoxygenated: yes  MILS maintained throughout  Mask difficulty assessment: 0 - not attempted    Final Airway Details  Final airway type: endotracheal airway      Successful airway: ETT  Cuffed: yes   Successful intubation technique: video laryngoscopy  Facilitating devices/methods: intubating stylet  Endotracheal tube insertion site: oral  Blade: Stinson  Blade size: 3  ETT size (mm): 7.5  Cormack-Lehane Classification: grade I - full view of glottis  Placement verified by: chest auscultation and capnometry   Measured from: lips  Number of attempts at approach: 1  Assessment: lips, teeth, and gum same as pre-op and atraumatic intubation

## 2021-11-30 NOTE — ANESTHESIA POSTPROCEDURE EVALUATION
Patient: Michelle Lange    Procedure Summary     Date: 11/30/21 Room / Location: [facility] OR [address] [place] [address] OR    Anesthesia Start: 0711 Anesthesia Stop: 1102    Procedure: LAPAROSCOPIC ASSISTED VAGINAL HYSTERECTOMY with salpingectomy; cystoscopy (N/A Abdomen) Diagnosis:       Post endometrial ablation syndrome      (Post endometrial ablation syndrome [N99.85])    Surgeons: Johnathan Younger MD Provider: Samuel Main MD    Anesthesia Type: general ASA Status: 4          Anesthesia Type: general    Vitals  No vitals data found for the desired time range.          Post Anesthesia Care and Evaluation    Patient location during evaluation: PACU  Level of consciousness: responsive to physical stimuli and obtunded/minimal responses  Pain score: 0  Pain management: adequate  Airway patency: patent  Anesthetic complications: No anesthetic complications  PONV Status: none  Cardiovascular status: acceptable and hemodynamically stable  Respiratory status: acceptable, face mask, oral airway and spontaneous ventilation  Hydration status: acceptable    Comments: 167/98, HR 95, sat 100%, resp 20, HOB elevated

## 2021-11-30 NOTE — ANESTHESIA PROCEDURE NOTES
Arterial Line      Patient location during procedure: OR  Start time: 11/30/2021 7:22 AM  Stop Time:11/30/2021 7:27 AM       Line placed for hemodynamic monitoring and ABGs/Labs/ISTAT.  Performed By   CRNA: Katia Patel CRNA  Preanesthetic Checklist  Completed: patient identified, IV checked, site marked, risks and benefits discussed, surgical consent, monitors and equipment checked, pre-op evaluation and timeout performed  Arterial Line Prep   Sterile Tech: mask and gloves  Prep: ChloraPrep  Patient monitoring: blood pressure monitoring, continuous pulse oximetry and EKG  Arterial Line Procedure   Laterality:left  Location:  radial artery  Catheter size: 20 G   Guidance: palpation technique  Number of attempts: 1  Successful placement: yes  Post Assessment   Dressing Type: occlusive dressing applied, secured with tape and wrist guard applied.   Complications no  Circ/Move/Sens Assessment: normal.   Patient Tolerance: patient tolerated the procedure well with no apparent complications

## 2021-12-01 VITALS
TEMPERATURE: 98.3 F | RESPIRATION RATE: 20 BRPM | SYSTOLIC BLOOD PRESSURE: 133 MMHG | HEART RATE: 68 BPM | HEIGHT: 66 IN | DIASTOLIC BLOOD PRESSURE: 78 MMHG | OXYGEN SATURATION: 99 % | WEIGHT: 293 LBS | BODY MASS INDEX: 47.09 KG/M2

## 2021-12-01 LAB
ANION GAP SERPL CALCULATED.3IONS-SCNC: 10 MMOL/L (ref 5–15)
BUN SERPL-MCNC: 13 MG/DL (ref 6–20)
BUN/CREAT SERPL: 13.1 (ref 7–25)
CALCIUM SPEC-SCNC: 9.3 MG/DL (ref 8.6–10.5)
CHLORIDE SERPL-SCNC: 106 MMOL/L (ref 98–107)
CO2 SERPL-SCNC: 24 MMOL/L (ref 22–29)
CREAT SERPL-MCNC: 0.99 MG/DL (ref 0.57–1)
DEPRECATED RDW RBC AUTO: 52.4 FL (ref 37–54)
ERYTHROCYTE [DISTWIDTH] IN BLOOD BY AUTOMATED COUNT: 17.1 % (ref 12.3–15.4)
GFR SERPL CREATININE-BSD FRML MDRD: 61 ML/MIN/1.73
GLUCOSE SERPL-MCNC: 116 MG/DL (ref 65–99)
HCT VFR BLD AUTO: 35 % (ref 34–46.6)
HGB BLD-MCNC: 11.2 G/DL (ref 12–15.9)
MCH RBC QN AUTO: 26.8 PG (ref 26.6–33)
MCHC RBC AUTO-ENTMCNC: 32 G/DL (ref 31.5–35.7)
MCV RBC AUTO: 83.7 FL (ref 79–97)
PLATELET # BLD AUTO: 370 10*3/MM3 (ref 140–450)
PMV BLD AUTO: 10.5 FL (ref 6–12)
POTASSIUM SERPL-SCNC: 4.6 MMOL/L (ref 3.5–5.2)
RBC # BLD AUTO: 4.18 10*6/MM3 (ref 3.77–5.28)
SODIUM SERPL-SCNC: 140 MMOL/L (ref 136–145)
WBC NRBC COR # BLD: 16.09 10*3/MM3 (ref 3.4–10.8)

## 2021-12-01 PROCEDURE — 80048 BASIC METABOLIC PNL TOTAL CA: CPT | Performed by: OBSTETRICS & GYNECOLOGY

## 2021-12-01 PROCEDURE — 85027 COMPLETE CBC AUTOMATED: CPT | Performed by: OBSTETRICS & GYNECOLOGY

## 2021-12-01 RX ORDER — POLYETHYLENE GLYCOL 3350 17 G/17G
17 POWDER, FOR SOLUTION ORAL DAILY
Qty: 7 PACKET | Refills: 0 | Status: SHIPPED | OUTPATIENT
Start: 2021-12-02 | End: 2021-12-09

## 2021-12-01 RX ORDER — ONDANSETRON 4 MG/1
4 TABLET, FILM COATED ORAL EVERY 6 HOURS PRN
Qty: 30 TABLET | Refills: 2 | Status: SHIPPED | OUTPATIENT
Start: 2021-12-01 | End: 2022-05-26

## 2021-12-01 RX ORDER — ACETAMINOPHEN 500 MG
1000 TABLET ORAL EVERY 8 HOURS
Qty: 84 TABLET | Refills: 2 | Status: SHIPPED | OUTPATIENT
Start: 2021-12-01 | End: 2021-12-15

## 2021-12-01 RX ORDER — OXYCODONE HYDROCHLORIDE 5 MG/1
5 TABLET ORAL EVERY 4 HOURS PRN
Qty: 12 TABLET | Refills: 0 | Status: SHIPPED | OUTPATIENT
Start: 2021-12-01 | End: 2021-12-06

## 2021-12-01 RX ADMIN — OXYCODONE 5 MG: 5 TABLET ORAL at 08:46

## 2021-12-01 RX ADMIN — ACETAMINOPHEN 1000 MG: 500 TABLET, FILM COATED ORAL at 13:10

## 2021-12-01 RX ADMIN — OXYCODONE 5 MG: 5 TABLET ORAL at 12:35

## 2021-12-01 RX ADMIN — CYANOCOBALAMIN TAB 1000 MCG 1000 MCG: 1000 TAB at 08:47

## 2021-12-01 RX ADMIN — ACETAMINOPHEN 1000 MG: 500 TABLET, FILM COATED ORAL at 05:21

## 2021-12-01 RX ADMIN — OXYCODONE 5 MG: 5 TABLET ORAL at 03:11

## 2021-12-01 RX ADMIN — GABAPENTIN 400 MG: 400 CAPSULE ORAL at 05:21

## 2021-12-01 RX ADMIN — GABAPENTIN 400 MG: 400 CAPSULE ORAL at 13:10

## 2021-12-01 NOTE — DISCHARGE SUMMARY
[facility]  Michelle Lange  : 1975  MRN: [id number]  CSN: [id number]    Discharge Summary      Date of Admission: 2021   Date of Discharge: 2021   Discharge Diagnosis:   Post endometrial ablation syndrome    S/P laparoscopic assisted vaginal hysterectomy (LAVH)  1. Pelvic  2. Morbid obesity   Procedures Performed: Procedure(s):  LAPAROSCOPIC ASSISTED VAGINAL HYSTERECTOMY with salpingectomy; cystoscopy      Brief History: Patient is a 46 y.o.who presented patient with pelvic pain was worked up at [facility] and at [facility].  Including MRI which was suggestive post ablation syndrome.  After extensive review risk benefits alternatives particularly given her morbid obesity she wanted to proceed with LAVH with ovarian retention if ovaries appeared normal.  Please see operative report.   Hospital Course: 1Her hospital course has been uneventful.  Today, on postoperative day # 1, she is tolerating a regular diet, ambulating without assistance, and desires to go home.  Hurt catheter was removed this morning, and she has urinated without difficulty. She has had no fever, and her pain is controlled.  She has had no nausea or vomiting.   Pending Studies: Tissue Pathology   Condition at Discharge: Stable   Discharge Diet: Diet Instructions     Diet: Regular      Discharge Diet: Regular         Discharge Activity: Activity Instructions     Pelvic Rest           Discharge Medications:    Your medication list      START taking these medications      Instructions Last Dose Given Next Dose Due   acetaminophen 500 MG tablet  Commonly known as: TYLENOL      Take 2 tablets by mouth Every 8 (Eight) Hours for 14 days.       ondansetron 4 MG tablet  Commonly known as: ZOFRAN      Take 1 tablet by mouth Every 6 (Six) Hours As Needed for Nausea or Vomiting.       oxyCODONE 5 MG immediate release tablet  Commonly known as: ROXICODONE      Take 1 tablet by mouth Every 4 (Four)  Hours As Needed for Moderate Pain for up to 6 days.       polyethylene glycol 17 g packet  Commonly known as: MIRALAX  Start taking on: December 2, 2021      Dissolve 1 packet (17 g) in 4 to 8 ounces of beverage and drink by mouth Daily for 7 doses.          CHANGE how you take these medications      Instructions Last Dose Given Next Dose Due   dexlansoprazole 60 MG capsule  Commonly known as: DEXILANT  What changed: when to take this      Take 1 capsule by mouth Daily for 180 days.          CONTINUE taking these medications      Instructions Last Dose Given Next Dose Due   albuterol sulfate  (90 Base) MCG/ACT inhaler  Commonly known as: PROVENTIL HFA;VENTOLIN HFA;PROAIR HFA      Inhale 2 puffs Every 4 (Four) Hours As Needed for Wheezing.       amLODIPine 10 MG tablet  Commonly known as: NORVASC      Take 10 mg by mouth Daily.       buPROPion  MG 24 hr tablet  Commonly known as: WELLBUTRIN XL      Take 150 mg by mouth Every Night.       butalbital-acetaminophen-caffeine -40 MG per tablet  Commonly known as: FIORICET, ESGIC      Take 1 tablet by mouth Every 4 (Four) Hours As Needed.       cyanocobalamin 1000 MCG tablet  Commonly known as: VITAMIN B-12      Take 1 tablet by mouth Daily.       ergocalciferol 1.25 MG (69974 UT) capsule  Commonly known as: ERGOCALCIFEROL      Take 50,000 Units by mouth.       folic acid 1 MG tablet  Commonly known as: FOLVITE      Take 1 tablet by mouth Daily.       gabapentin 600 MG tablet  Commonly known as: NEURONTIN      Take 300 mg by mouth 4 (Four) Times a Day.       tiZANidine 4 MG tablet  Commonly known as: ZANAFLEX      Take 4 mg by mouth Every 8 (Eight) Hours As Needed.             Where to Get Your Medications      These medications were sent to [facility]  [address] [place] [address]    Hours: Monday through Friday 7:00am to 5:00pm Phone: [phone] ·   acetaminophen 500 MG tablet  · ondansetron  4 MG tablet  · oxyCODONE 5 MG immediate release tablet  · polyethylene glycol 17 g packet        Discharge Disposition: home   Follow-up: Future Appointments   Date Time Provider Department Center   12/3/2021  9:45 AM Johnathan Younger MD [facility] OBG MAD None   12/13/2021  3:15 PM Paulina Guzmán APRN [facility] GE [facility]AD [facility]   1/10/2022  2:00 PM NURSE [facility] MAD OPI [facility]   1/10/2022  2:30 PM Bg Segura MD [facility] ONC [facility]            This note has been electronically signed.    Johnathan Younger MD  December 1, 2021  17:26 CST

## 2021-12-01 NOTE — PLAN OF CARE
Goal Outcome Evaluation:  Plan of Care Reviewed With: patient, spouse        Progress: improving  Outcome Summary: VSS, heredia in place with adequate output, ambulated in the room, pain controlled with meds. will continue to monitor

## 2021-12-01 NOTE — PROGRESS NOTES
[facility]  Michelle Lange  : 1975  MRN: [id number]  CSN: [id number]    Post-operative Day #1  Subjective   Her pain is well controlled.  Vaginal bleeding is appropriate amount.  She is passing gas and has not had a bowel movement.     Objective     Min/max vitals past 24 hours:   Temp  Min: 96.9 °F (36.1 °C)  Max: 98.1 °F (36.7 °C)  BP  Min: 103/56  Max: 167/98  Pulse  Min: 63  Max: 100  Pulse  Min: 63  Max: 100        General: well developed; well nourished  no acute distress   Abdomen: soft, non-tender; no masses   Pelvic: Not performed   Ext: Calves NT     Lab Results   Component Value Date    WBC 16.09 (H) 2021    HGB 11.2 (L) 2021    HCT 35.0 2021    MCV 83.7 2021     2021    RH Positive 2021        Assessment   1. S/P 1 doing well only having pain as expected right port where the fascia was closed with Rio Blood     Plan   1. IV to lock DC Hurt encourage ambulation reevaluate p.m.          This document has been electronically signed by Johnathan Younger MD on 2021 08:11 CST

## 2021-12-02 ENCOUNTER — TELEPHONE (OUTPATIENT)
Dept: OBSTETRICS AND GYNECOLOGY | Facility: CLINIC | Age: 46
End: 2021-12-02

## 2021-12-02 NOTE — TELEPHONE ENCOUNTER
Called patient says doing well no complaints.  No fevers chills.  Somewhat of a small cough.  I am supposed to see her tomorrow call for any problems in interim

## 2021-12-03 ENCOUNTER — OFFICE VISIT (OUTPATIENT)
Dept: OBSTETRICS AND GYNECOLOGY | Facility: CLINIC | Age: 46
End: 2021-12-03

## 2021-12-03 VITALS
DIASTOLIC BLOOD PRESSURE: 90 MMHG | HEIGHT: 66 IN | SYSTOLIC BLOOD PRESSURE: 128 MMHG | WEIGHT: 293 LBS | BODY MASS INDEX: 47.09 KG/M2

## 2021-12-03 DIAGNOSIS — Z98.890 POSTOPERATIVE STATE: Primary | ICD-10-CM

## 2021-12-03 LAB
LAB AP CASE REPORT: NORMAL
PATH REPORT.FINAL DX SPEC: NORMAL

## 2021-12-03 PROCEDURE — 99024 POSTOP FOLLOW-UP VISIT: CPT | Performed by: OBSTETRICS & GYNECOLOGY

## 2021-12-03 RX ORDER — AMOXICILLIN AND CLAVULANATE POTASSIUM 875; 125 MG/1; MG/1
1 TABLET, FILM COATED ORAL EVERY 12 HOURS
Qty: 10 TABLET | Refills: 0 | Status: SHIPPED | OUTPATIENT
Start: 2021-12-03 | End: 2021-12-13

## 2021-12-03 NOTE — PROGRESS NOTES
Michelle Lange is a 46 y.o. y/o female.     Chief Complaint: Postop follow-up    HPI:   46 y.o. No obstetric history on file..  No LMP recorded. Patient has had a hysterectomy..  Patient is 3 days status post LAVH doing well no complaints except for pain around right port site which was closed with Rio Blood some dysuria       Review of Systems     Constitutional: Denies night sweats    HENT: No hearing changes, denies ear pain    Eye: No eye pain; no foreign body in eye    Pulmonary: No hemoptysis    Cardiovascular: No claudication    GI: No hematemesis    Musculoskeletal: No arthralgias, no joint swelling    Endocrine: No polydipsia or polyuria    Hematologic: Denies any free bleeding    Psychiatric: Denies any delusions    The following portions of the patient's history were reviewed and updated as appropriate: allergies, current medications, past family history, past medical history, past social history, past surgical history and problem list.    Allergies   Allergen Reactions   • Ibuprofen Anaphylaxis   • Phenergan [Promethazine] Other (See Comments)     Pt states arms and legs jump uncontrollably         Outpatient Medications Prior to Visit   Medication Sig Dispense Refill   • acetaminophen (TYLENOL) 500 MG tablet Take 2 tablets by mouth Every 8 (Eight) Hours for 14 days. 84 tablet 2   • albuterol (PROVENTIL HFA;VENTOLIN HFA) 108 (90 Base) MCG/ACT inhaler Inhale 2 puffs Every 4 (Four) Hours As Needed for Wheezing.     • amLODIPine (NORVASC) 10 MG tablet Take 10 mg by mouth Daily.     • buPROPion XL (WELLBUTRIN XL) 150 MG 24 hr tablet Take 150 mg by mouth Every Night.     • butalbital-acetaminophen-caffeine (FIORICET, ESGIC) -40 MG per tablet Take 1 tablet by mouth Every 4 (Four) Hours As Needed.     • cyanocobalamin (VITAMIN B-12) 1000 MCG tablet Take 1 tablet by mouth Daily. 90 tablet 1   • dexlansoprazole (DEXILANT) 60 MG capsule Take 1 capsule by mouth Daily for 180 days. (Patient taking  differently: Take 60 mg by mouth Every Night.) 30 capsule 5   • ergocalciferol (ERGOCALCIFEROL) 1.25 MG (99678 UT) capsule Take 50,000 Units by mouth.     • folic acid (FOLVITE) 1 MG tablet Take 1 tablet by mouth Daily. 90 tablet 1   • gabapentin (NEURONTIN) 600 MG tablet Take 300 mg by mouth 4 (Four) Times a Day.     • ondansetron (ZOFRAN) 4 MG tablet Take 1 tablet by mouth Every 6 (Six) Hours As Needed for Nausea or Vomiting. 30 tablet 2   • oxyCODONE (ROXICODONE) 5 MG immediate release tablet Take 1 tablet by mouth Every 4 (Four) Hours As Needed for Moderate Pain for up to 6 days. 12 tablet 0   • polyethylene glycol (MIRALAX) 17 g packet Dissolve 1 packet (17 g) in 4 to 8 ounces of beverage and drink by mouth Daily for 7 doses. 7 packet 0   • tiZANidine (ZANAFLEX) 4 MG tablet Take 4 mg by mouth Every 8 (Eight) Hours As Needed.       No facility-administered medications prior to visit.        The patient has a family history of   Family History   Problem Relation Age of Onset   • Diabetes Other    • Heart disease Other    • Hypertension Other    • Stroke Other    • Seizures Other    • Heart disease Mother    • Hypertension Mother    • Diabetes Father    • Heart disease Father    • Hypertension Father    • Osteoporosis Father    • Diabetes Maternal Grandmother    • Hypertension Maternal Grandmother    • Diabetes Paternal Grandmother    • Heart disease Paternal Grandmother    • Hypertension Paternal Grandmother    • Osteoporosis Paternal Grandmother         Past Medical History:   Diagnosis Date   • Anemia    • Anxiety and depression    • Asthma    • Excessive or frequent menstruation    • GERD (gastroesophageal reflux disease)    • History of mammogram 02/24/2016   • Hypertension    • Migraines    • Otitis media    • Otorrhea    • Plantar fasciitis         OB History    No obstetric history on file.          Social History     Socioeconomic History   • Marital status:    Tobacco Use   • Smoking status: Never  "Smoker   • Smokeless tobacco: Never Used   Vaping Use   • Vaping Use: Never used   Substance and Sexual Activity   • Alcohol use: No   • Drug use: Never   • Sexual activity: Yes     Partners: Male     Birth control/protection: Surgical        Past Surgical History:   Procedure Laterality Date   •  SECTION     • CHOLECYSTECTOMY     • COLONOSCOPY N/A 2021    Procedure: COLONOSCOPY;  Surgeon: Michelle Lemus MD;  Location: [facility] ENDOSCOPY;  Service: Gastroenterology;  Laterality: N/A;   • ENDOSCOPY N/A 2021    Procedure: ESOPHAGOGASTRODUODENOSCOPY;  Surgeon: Michelle Lemus MD;  Location: [facility] ENDOSCOPY;  Service: Gastroenterology;  Laterality: N/A;   • GASTRIC SLEEVE LAPAROSCOPIC     • INJECTION OF MEDICATION  2015    Kenalog (1)     • KNEE SURGERY Right    • LAPAROSCOPIC ASSISTED VAGINAL HYSTERECTOMY N/A 2021    Procedure: LAPAROSCOPIC ASSISTED VAGINAL HYSTERECTOMY with salpingectomy; cystoscopy;  Surgeon: Johnathan Younger MD;  Location: [facility] OR;  Service: Obstetrics/Gynecology;  Laterality: N/A;   • OTHER SURGICAL HISTORY  2014    Hysteroscope procedure (1)    Exam under anesthesia, diagnostic hysteroscopy with fractional dilation and curettage and NovaSure endometrial ablation.    • TUBAL ABDOMINAL LIGATION     • UPPER GASTROINTESTINAL ENDOSCOPY  2021        Patient Active Problem List   Diagnosis   • Acute pain of both knees   • Primary osteoarthritis of both knees   • Chondromalacia of both patellae   • Constipation   • Anemia   • Thrombocytosis   • Adverse effect of iron   • Lower abdominal pain   • Post endometrial ablation syndrome   • S/P laparoscopic assisted vaginal hysterectomy (LAVH)        Documented Vitals    21 0943   BP: 128/90   Weight: (!) 138 kg (305 lb)   Height: 167.6 cm (66\")   PainSc:   6        Body mass index is 49.23 kg/m².    Physical Exam  Constitutional: Appears to be in no acute distress; Eyes: Sclerae normal; Endocrine system: " Thyroid palpation is normal; Pulmonary system: Lungs clear; Cardiovascular system: Heart regular rate and rhythm; Gastrointestinal system: Abdomen soft and nontender, active bowel sounds; Urologic system: CVA negative; Psychiatric: Appropriate insight; Neurologic: Gait within normal limits incisions healing well    Laboratory Data:   Lab Results - Last 18 Months   Lab Units 12/01/21  0509 11/29/21  0904 08/23/21  1610 07/27/21  1531 07/06/21  1447   GLUCOSE mg/dL 116* 90 103* 116*  --    BUN mg/dL 13 17 15 17 24   CREATININE mg/dL 0.99 1.06* 1.13* 1.15* 1.2   SODIUM mmol/L 140 139 141 141 138   POTASSIUM mmol/L 4.6 3.9 4.2 4.2 4.1   CHLORIDE mmol/L 106 101 106 106 106   TOTAL CO2 mmol/L  --   --   --   --  25   CO2 mmol/L 24.0 28.0 25.0 28.0  --    CALCIUM mg/dL 9.3 9.5 9.1 8.6 8.5*   TOTAL PROTEIN g/dL  --  8.5 7.4  --   --    ALBUMIN g/dL  --  4.20 3.90  --  3.7   ALT (SGPT) U/L  --  18 14  --  12   AST (SGOT) U/L  --  14 16  --  15   ALK PHOS U/L  --  113 88  --  93   BILIRUBIN mg/dL  --  0.3 0.2  --  0.24*   EGFR IF NONAFRICN AM mL/min/1.73 61 56* 52* 51*  --    GLOBULIN gm/dL  --  4.3 3.5  --   --    A/G RATIO g/dL  --  1.0 1.1  --   --    BUN / CREAT RATIO  13.1 16.0 13.3 14.8  --    ANION GAP mmol/L 10.0 10.0 10.0 7.0  --      Lab Results - Last 18 Months   Lab Units 12/01/21  0509 11/29/21  0904 10/04/21  1344 08/23/21  1610 08/02/21  1541   WBC 10*3/mm3 16.09* 7.75 8.45 9.28 10.24   RBC 10*6/mm3 4.18 4.89 4.50 4.33 4.49   HEMOGLOBIN g/dL 11.2* 13.2 11.8* 11.2* 11.5*   HEMATOCRIT % 35.0 41.1 36.7 36.2 36.7   MCV fL 83.7 84.0 81.6 83.6 81.7   MCH pg 26.8 27.0 26.2* 25.9* 25.6*   MCHC g/dL 32.0 32.1 32.2 30.9* 31.3*   RDW % 17.1* 16.4* 14.9 16.3* 15.9*   RDW-SD fl 52.4 50.4 43.8 49.2 47.2   MPV fL 10.5 9.9 9.6 10.3 9.5   PLATELETS 10*3/mm3 370 425 433 433 506*     Lab Results - Last 18 Months   Lab Units 07/27/21  1721   HCG QUALITATIVE  Negative       Assessment        Diagnosis Plan   1. Postoperative state    we will convert this area see back Monday         Plan         New Medications Ordered This Visit   Medications   • amoxicillin-clavulanate (Augmentin) 875-125 MG per tablet     Sig: Take 1 tablet by mouth Every 12 (Twelve) Hours.     Dispense:  10 tablet     Refill:  0             This document has been electronically signed by Johnathan Younger MD on December 3, 2021 14:12 CST    Please note that portions of this note were completed with a voice recognition program.

## 2021-12-06 ENCOUNTER — OFFICE VISIT (OUTPATIENT)
Dept: OBSTETRICS AND GYNECOLOGY | Facility: CLINIC | Age: 46
End: 2021-12-06

## 2021-12-06 ENCOUNTER — LAB (OUTPATIENT)
Dept: LAB | Facility: HOSPITAL | Age: 46
End: 2021-12-06

## 2021-12-06 VITALS
OXYGEN SATURATION: 98 % | WEIGHT: 293 LBS | BODY MASS INDEX: 47.09 KG/M2 | HEIGHT: 66 IN | DIASTOLIC BLOOD PRESSURE: 80 MMHG | SYSTOLIC BLOOD PRESSURE: 126 MMHG | HEART RATE: 73 BPM | TEMPERATURE: 97.6 F

## 2021-12-06 DIAGNOSIS — Z98.890 POSTOPERATIVE STATE: Primary | ICD-10-CM

## 2021-12-06 DIAGNOSIS — Z98.890 POSTOPERATIVE STATE: ICD-10-CM

## 2021-12-06 LAB
BASOPHILS # BLD AUTO: 0.06 10*3/MM3 (ref 0–0.2)
BASOPHILS NFR BLD AUTO: 0.6 % (ref 0–1.5)
CRP SERPL-MCNC: 2.86 MG/DL (ref 0–0.5)
DEPRECATED RDW RBC AUTO: 51.6 FL (ref 37–54)
EOSINOPHIL # BLD AUTO: 0.65 10*3/MM3 (ref 0–0.4)
EOSINOPHIL NFR BLD AUTO: 6.1 % (ref 0.3–6.2)
ERYTHROCYTE [DISTWIDTH] IN BLOOD BY AUTOMATED COUNT: 16.6 % (ref 12.3–15.4)
HCT VFR BLD AUTO: 38.6 % (ref 34–46.6)
HGB BLD-MCNC: 12.3 G/DL (ref 12–15.9)
IMM GRANULOCYTES # BLD AUTO: 0.07 10*3/MM3 (ref 0–0.05)
IMM GRANULOCYTES NFR BLD AUTO: 0.7 % (ref 0–0.5)
LYMPHOCYTES # BLD AUTO: 2.88 10*3/MM3 (ref 0.7–3.1)
LYMPHOCYTES NFR BLD AUTO: 27.2 % (ref 19.6–45.3)
MCH RBC QN AUTO: 27.2 PG (ref 26.6–33)
MCHC RBC AUTO-ENTMCNC: 31.9 G/DL (ref 31.5–35.7)
MCV RBC AUTO: 85.4 FL (ref 79–97)
MONOCYTES # BLD AUTO: 0.9 10*3/MM3 (ref 0.1–0.9)
MONOCYTES NFR BLD AUTO: 8.5 % (ref 5–12)
NEUTROPHILS NFR BLD AUTO: 56.9 % (ref 42.7–76)
NEUTROPHILS NFR BLD AUTO: 6.04 10*3/MM3 (ref 1.7–7)
NRBC BLD AUTO-RTO: 0 /100 WBC (ref 0–0.2)
PLATELET # BLD AUTO: 459 10*3/MM3 (ref 140–450)
PMV BLD AUTO: 9.5 FL (ref 6–12)
RBC # BLD AUTO: 4.52 10*6/MM3 (ref 3.77–5.28)
WBC NRBC COR # BLD: 10.6 10*3/MM3 (ref 3.4–10.8)

## 2021-12-06 PROCEDURE — 36415 COLL VENOUS BLD VENIPUNCTURE: CPT | Performed by: OBSTETRICS & GYNECOLOGY

## 2021-12-06 PROCEDURE — 86140 C-REACTIVE PROTEIN: CPT

## 2021-12-06 PROCEDURE — 99024 POSTOP FOLLOW-UP VISIT: CPT | Performed by: OBSTETRICS & GYNECOLOGY

## 2021-12-06 PROCEDURE — 85025 COMPLETE CBC W/AUTO DIFF WBC: CPT | Performed by: OBSTETRICS & GYNECOLOGY

## 2021-12-06 RX ORDER — OXYCODONE HYDROCHLORIDE 10 MG/1
10 TABLET ORAL EVERY 4 HOURS PRN
Qty: 12 TABLET | Refills: 0 | Status: SHIPPED | OUTPATIENT
Start: 2021-12-06 | End: 2022-05-26

## 2021-12-06 NOTE — PROGRESS NOTES
Michelle Lange is a 46 y.o. y/o female.     Chief Complaint: Postop follow-up    HPI:   46 y.o. No obstetric history on file..  No LMP recorded. Patient has had a hysterectomy..  Patient says generally doing well having some hot flashes been taking her temperature no fever.  No nausea vomiting.  She is not tachycardic still having some pain on movement at the right 1011 port site          Review of Systems     Constitutional: Denies night sweats    HENT: No hearing changes, denies ear pain    Eye: No eye pain; no foreign body in eye    Pulmonary: No hemoptysis    Cardiovascular: No claudication    GI: No hematemesis    Musculoskeletal: No arthralgias, no joint swelling    Endocrine: No polydipsia or polyuria    Hematologic: Denies any free bleeding    Psychiatric: Denies any delusions    The following portions of the patient's history were reviewed and updated as appropriate: allergies, current medications, past family history, past medical history, past social history, past surgical history and problem list.    Allergies   Allergen Reactions   • Ibuprofen Anaphylaxis   • Phenergan [Promethazine] Other (See Comments)     Pt states arms and legs jump uncontrollably         Outpatient Medications Prior to Visit   Medication Sig Dispense Refill   • acetaminophen (TYLENOL) 500 MG tablet Take 2 tablets by mouth Every 8 (Eight) Hours for 14 days. 84 tablet 2   • albuterol (PROVENTIL HFA;VENTOLIN HFA) 108 (90 Base) MCG/ACT inhaler Inhale 2 puffs Every 4 (Four) Hours As Needed for Wheezing.     • amLODIPine (NORVASC) 10 MG tablet Take 10 mg by mouth Daily.     • amoxicillin-clavulanate (Augmentin) 875-125 MG per tablet Take 1 tablet by mouth Every 12 (Twelve) Hours. 10 tablet 0   • buPROPion XL (WELLBUTRIN XL) 150 MG 24 hr tablet Take 150 mg by mouth Every Night.     • butalbital-acetaminophen-caffeine (FIORICET, ESGIC) -40 MG per tablet Take 1 tablet by mouth Every 4 (Four) Hours As Needed.     •  cyanocobalamin (VITAMIN B-12) 1000 MCG tablet Take 1 tablet by mouth Daily. 90 tablet 1   • dexlansoprazole (DEXILANT) 60 MG capsule Take 1 capsule by mouth Daily for 180 days. (Patient taking differently: Take 60 mg by mouth Every Night.) 30 capsule 5   • ergocalciferol (ERGOCALCIFEROL) 1.25 MG (59273 UT) capsule Take 50,000 Units by mouth.     • folic acid (FOLVITE) 1 MG tablet Take 1 tablet by mouth Daily. 90 tablet 1   • gabapentin (NEURONTIN) 600 MG tablet Take 300 mg by mouth 4 (Four) Times a Day.     • ondansetron (ZOFRAN) 4 MG tablet Take 1 tablet by mouth Every 6 (Six) Hours As Needed for Nausea or Vomiting. 30 tablet 2   • polyethylene glycol (MIRALAX) 17 g packet Dissolve 1 packet (17 g) in 4 to 8 ounces of beverage and drink by mouth Daily for 7 doses. 7 packet 0   • tiZANidine (ZANAFLEX) 4 MG tablet Take 4 mg by mouth Every 8 (Eight) Hours As Needed.     • oxyCODONE (ROXICODONE) 5 MG immediate release tablet Take 1 tablet by mouth Every 4 (Four) Hours As Needed for Moderate Pain for up to 6 days. 12 tablet 0     No facility-administered medications prior to visit.        The patient has a family history of   Family History   Problem Relation Age of Onset   • Diabetes Other    • Heart disease Other    • Hypertension Other    • Stroke Other    • Seizures Other    • Heart disease Mother    • Hypertension Mother    • Diabetes Father    • Heart disease Father    • Hypertension Father    • Osteoporosis Father    • Diabetes Maternal Grandmother    • Hypertension Maternal Grandmother    • Diabetes Paternal Grandmother    • Heart disease Paternal Grandmother    • Hypertension Paternal Grandmother    • Osteoporosis Paternal Grandmother         Past Medical History:   Diagnosis Date   • Anemia    • Anxiety and depression    • Asthma    • Excessive or frequent menstruation    • GERD (gastroesophageal reflux disease)    • History of mammogram 02/24/2016   • Hypertension    • Migraines    • Otitis media    • Otorrhea     • Plantar fasciitis         OB History    No obstetric history on file.          Social History     Socioeconomic History   • Marital status:    Tobacco Use   • Smoking status: Never Smoker   • Smokeless tobacco: Never Used   Vaping Use   • Vaping Use: Never used   Substance and Sexual Activity   • Alcohol use: No   • Drug use: Never   • Sexual activity: Yes     Partners: Male     Birth control/protection: Surgical        Past Surgical History:   Procedure Laterality Date   •  SECTION     • CHOLECYSTECTOMY     • COLONOSCOPY N/A 2021    Procedure: COLONOSCOPY;  Surgeon: Michelle Lemus MD;  Location: [facility] [place] ENDOSCOPY;  Service: Gastroenterology;  Laterality: N/A;   • ENDOSCOPY N/A 2021    Procedure: ESOPHAGOGASTRODUODENOSCOPY;  Surgeon: Michelle Lemus MD;  Location: [facility] [place] ENDOSCOPY;  Service: Gastroenterology;  Laterality: N/A;   • GASTRIC SLEEVE LAPAROSCOPIC     • INJECTION OF MEDICATION  2015    Kenalog (1)     • KNEE SURGERY Right    • LAPAROSCOPIC ASSISTED VAGINAL HYSTERECTOMY N/A 2021    Procedure: LAPAROSCOPIC ASSISTED VAGINAL HYSTERECTOMY with salpingectomy; cystoscopy;  Surgeon: Johnathan Younger MD;  Location: [facility] [place] OR;  Service: Obstetrics/Gynecology;  Laterality: N/A;   • OTHER SURGICAL HISTORY  2014    Hysteroscope procedure (1)    Exam under anesthesia, diagnostic hysteroscopy with fractional dilation and curettage and NovaSure endometrial ablation.    • TUBAL ABDOMINAL LIGATION     • UPPER GASTROINTESTINAL ENDOSCOPY  2021        Patient Active Problem List   Diagnosis   • Acute pain of both knees   • Primary osteoarthritis of both knees   • Chondromalacia of both patellae   • Constipation   • Anemia   • Thrombocytosis   • Adverse effect of iron   • Lower abdominal pain   • Post endometrial ablation syndrome   • S/P laparoscopic assisted vaginal hysterectomy (LAVH)        Documented Vitals    21 0936   BP: 126/80   Pulse: 73   Temp: 97.6  "°F (36.4 °C)   SpO2: 98%   Weight: (!) 142 kg (312 lb)   Height: 167.6 cm (66\")   PainSc:   3        Body mass index is 50.36 kg/m².    Physical Exam  Constitutional: Appears to be in no acute distress; Eyes: Sclerae normal; Endocrine system: Thyroid palpation is normal; Pulmonary system: Lungs clear; Cardiovascular system: Heart regular rate and rhythm; Gastrointestinal system: Abdomen soft and nontender, active bowel sounds; Urologic system: CVA negative; Psychiatric: Appropriate insight; Neurologic: Gait within normal limits abdomen soft nontender even to deep palpation    Laboratory Data:   Lab Results - Last 18 Months   Lab Units 12/01/21  0509 11/29/21  0904 08/23/21  1610 07/27/21  1531 07/06/21  1447   GLUCOSE mg/dL 116* 90 103* 116*  --    BUN mg/dL 13 17 15 17 24   CREATININE mg/dL 0.99 1.06* 1.13* 1.15* 1.2   SODIUM mmol/L 140 139 141 141 138   POTASSIUM mmol/L 4.6 3.9 4.2 4.2 4.1   CHLORIDE mmol/L 106 101 106 106 106   TOTAL CO2 mmol/L  --   --   --   --  25   CO2 mmol/L 24.0 28.0 25.0 28.0  --    CALCIUM mg/dL 9.3 9.5 9.1 8.6 8.5*   TOTAL PROTEIN g/dL  --  8.5 7.4  --   --    ALBUMIN g/dL  --  4.20 3.90  --  3.7   ALT (SGPT) U/L  --  18 14  --  12   AST (SGOT) U/L  --  14 16  --  15   ALK PHOS U/L  --  113 88  --  93   BILIRUBIN mg/dL  --  0.3 0.2  --  0.24*   EGFR IF NONAFRICN AM mL/min/1.73 61 56* 52* 51*  --    GLOBULIN gm/dL  --  4.3 3.5  --   --    A/G RATIO g/dL  --  1.0 1.1  --   --    BUN / CREAT RATIO  13.1 16.0 13.3 14.8  --    ANION GAP mmol/L 10.0 10.0 10.0 7.0  --      Lab Results - Last 18 Months   Lab Units 12/06/21  1004 12/01/21  0509 11/29/21  0904 10/04/21  1344 08/23/21  1610   WBC 10*3/mm3 10.60 16.09* 7.75 8.45 9.28   RBC 10*6/mm3 4.52 4.18 4.89 4.50 4.33   HEMOGLOBIN g/dL 12.3 11.2* 13.2 11.8* 11.2*   HEMATOCRIT % 38.6 35.0 41.1 36.7 36.2   MCV fL 85.4 83.7 84.0 81.6 83.6   MCH pg 27.2 26.8 27.0 26.2* 25.9*   MCHC g/dL 31.9 32.0 32.1 32.2 30.9*   RDW % 16.6* 17.1* 16.4* 14.9 16.3* "   RDW-SD fl 51.6 52.4 50.4 43.8 49.2   MPV fL 9.5 10.5 9.9 9.6 10.3   PLATELETS 10*3/mm3 459* 370 425 433 433     Lab Results - Last 18 Months   Lab Units 07/27/21  1721   HCG QUALITATIVE  Negative       Assessment        Diagnosis Plan   1. Postoperative state  CBC Auto Differential    C-reactive Protein    oxyCODONE (ROXICODONE) 10 MG tablet         Plan       After reviewing the risk benefits alternatives were gone at renew pain medication reviewed risk of addiction.  We will also check labs for inflammation  New Medications Ordered This Visit   Medications   • oxyCODONE (ROXICODONE) 10 MG tablet     Sig: Take 1 tablet by mouth Every 4 (Four) Hours As Needed for Moderate Pain .     Dispense:  12 tablet     Refill:  0             This document has been electronically signed by Johnathan Younger MD on December 6, 2021 17:32 CST    Please note that portions of this note were completed with a voice recognition program.

## 2021-12-09 ENCOUNTER — OFFICE VISIT (OUTPATIENT)
Dept: OBSTETRICS AND GYNECOLOGY | Facility: CLINIC | Age: 46
End: 2021-12-09

## 2021-12-09 VITALS
WEIGHT: 293 LBS | HEIGHT: 66 IN | DIASTOLIC BLOOD PRESSURE: 90 MMHG | BODY MASS INDEX: 47.09 KG/M2 | SYSTOLIC BLOOD PRESSURE: 130 MMHG

## 2021-12-09 DIAGNOSIS — Z98.890 POSTOPERATIVE STATE: Primary | ICD-10-CM

## 2021-12-09 PROCEDURE — 99024 POSTOP FOLLOW-UP VISIT: CPT | Performed by: OBSTETRICS & GYNECOLOGY

## 2021-12-10 NOTE — PROGRESS NOTES
Michelle Lange is a 46 y.o. y/o female.     Chief Complaint: Postoperative follow-up    HPI:   46 y.o. No obstetric history on file..  No LMP recorded. Patient has had a hysterectomy..  Postop doing well no complaints normal bowel and bladder function says pain from right 1011 site doing well          Review of Systems     Constitutional: Denies night sweats    HENT: No hearing changes, denies ear pain    Eye: No eye pain; no foreign body in eye    Pulmonary: No hemoptysis    Cardiovascular: No claudication    GI: No hematemesis    Musculoskeletal: No arthralgias, no joint swelling    Endocrine: No polydipsia or polyuria    Hematologic: Denies any free bleeding    Psychiatric: Denies any delusions    The following portions of the patient's history were reviewed and updated as appropriate: allergies, current medications, past family history, past medical history, past social history, past surgical history and problem list.    Allergies   Allergen Reactions   • Ibuprofen Anaphylaxis   • Phenergan [Promethazine] Other (See Comments)     Pt states arms and legs jump uncontrollably         Outpatient Medications Prior to Visit   Medication Sig Dispense Refill   • acetaminophen (TYLENOL) 500 MG tablet Take 2 tablets by mouth Every 8 (Eight) Hours for 14 days. 84 tablet 2   • albuterol (PROVENTIL HFA;VENTOLIN HFA) 108 (90 Base) MCG/ACT inhaler Inhale 2 puffs Every 4 (Four) Hours As Needed for Wheezing.     • amLODIPine (NORVASC) 10 MG tablet Take 10 mg by mouth Daily.     • amoxicillin-clavulanate (Augmentin) 875-125 MG per tablet Take 1 tablet by mouth Every 12 (Twelve) Hours. 10 tablet 0   • buPROPion XL (WELLBUTRIN XL) 150 MG 24 hr tablet Take 150 mg by mouth Every Night.     • butalbital-acetaminophen-caffeine (FIORICET, ESGIC) -40 MG per tablet Take 1 tablet by mouth Every 4 (Four) Hours As Needed.     • cyanocobalamin (VITAMIN B-12) 1000 MCG tablet Take 1 tablet by mouth Daily. 90 tablet 1   •  dexlansoprazole (DEXILANT) 60 MG capsule Take 1 capsule by mouth Daily for 180 days. (Patient taking differently: Take 60 mg by mouth Every Night.) 30 capsule 5   • ergocalciferol (ERGOCALCIFEROL) 1.25 MG (11256 UT) capsule Take 50,000 Units by mouth.     • folic acid (FOLVITE) 1 MG tablet Take 1 tablet by mouth Daily. 90 tablet 1   • gabapentin (NEURONTIN) 600 MG tablet Take 300 mg by mouth 4 (Four) Times a Day.     • ondansetron (ZOFRAN) 4 MG tablet Take 1 tablet by mouth Every 6 (Six) Hours As Needed for Nausea or Vomiting. 30 tablet 2   • oxyCODONE (ROXICODONE) 10 MG tablet Take 1 tablet by mouth Every 4 (Four) Hours As Needed for Moderate Pain . 12 tablet 0   • polyethylene glycol (MIRALAX) 17 g packet Dissolve 1 packet (17 g) in 4 to 8 ounces of beverage and drink by mouth Daily for 7 doses. 7 packet 0   • tiZANidine (ZANAFLEX) 4 MG tablet Take 4 mg by mouth Every 8 (Eight) Hours As Needed.       No facility-administered medications prior to visit.        The patient has a family history of   Family History   Problem Relation Age of Onset   • Diabetes Other    • Heart disease Other    • Hypertension Other    • Stroke Other    • Seizures Other    • Heart disease Mother    • Hypertension Mother    • Diabetes Father    • Heart disease Father    • Hypertension Father    • Osteoporosis Father    • Diabetes Maternal Grandmother    • Hypertension Maternal Grandmother    • Diabetes Paternal Grandmother    • Heart disease Paternal Grandmother    • Hypertension Paternal Grandmother    • Osteoporosis Paternal Grandmother         Past Medical History:   Diagnosis Date   • Anemia    • Anxiety and depression    • Asthma    • Excessive or frequent menstruation    • GERD (gastroesophageal reflux disease)    • History of mammogram 02/24/2016   • Hypertension    • Migraines    • Otitis media    • Otorrhea    • Plantar fasciitis         OB History    No obstetric history on file.          Social History     Socioeconomic History  "  • Marital status:    Tobacco Use   • Smoking status: Never Smoker   • Smokeless tobacco: Never Used   Vaping Use   • Vaping Use: Never used   Substance and Sexual Activity   • Alcohol use: No   • Drug use: Never   • Sexual activity: Yes     Partners: Male     Birth control/protection: Surgical        Past Surgical History:   Procedure Laterality Date   •  SECTION     • CHOLECYSTECTOMY     • COLONOSCOPY N/A 2021    Procedure: COLONOSCOPY;  Surgeon: Michelle Lemus MD;  Location: [facility] ENDOSCOPY;  Service: Gastroenterology;  Laterality: N/A;   • ENDOSCOPY N/A 2021    Procedure: ESOPHAGOGASTRODUODENOSCOPY;  Surgeon: Michelle Lemus MD;  Location: [facility] ENDOSCOPY;  Service: Gastroenterology;  Laterality: N/A;   • GASTRIC SLEEVE LAPAROSCOPIC     • INJECTION OF MEDICATION  2015    Kenalog (1)     • KNEE SURGERY Right    • LAPAROSCOPIC ASSISTED VAGINAL HYSTERECTOMY N/A 2021    Procedure: LAPAROSCOPIC ASSISTED VAGINAL HYSTERECTOMY with salpingectomy; cystoscopy;  Surgeon: Johnathan Younger MD;  Location: [facility] OR;  Service: Obstetrics/Gynecology;  Laterality: N/A;   • OTHER SURGICAL HISTORY  2014    Hysteroscope procedure (1)    Exam under anesthesia, diagnostic hysteroscopy with fractional dilation and curettage and NovaSure endometrial ablation.    • TUBAL ABDOMINAL LIGATION     • UPPER GASTROINTESTINAL ENDOSCOPY  2021        Patient Active Problem List   Diagnosis   • Acute pain of both knees   • Primary osteoarthritis of both knees   • Chondromalacia of both patellae   • Constipation   • Anemia   • Thrombocytosis   • Adverse effect of iron   • Lower abdominal pain   • Post endometrial ablation syndrome   • S/P laparoscopic assisted vaginal hysterectomy (LAVH)        Documented Vitals    21 0906   BP: 130/90   Weight: (!) 141 kg (310 lb 3.2 oz)   Height: 167.6 cm (66\")   PainSc:   1        Body mass index is 50.07 kg/m².    Physical Exam  Constitutional: " Appears to be in no acute distress; Eyes: Sclerae normal; Endocrine system: Thyroid palpation is normal; Pulmonary system: Lungs clear; Cardiovascular system: Heart regular rate and rhythm; Gastrointestinal system: Abdomen soft and nontender, active bowel sounds; Urologic system: CVA negative; Psychiatric: Appropriate insight; Neurologic: Gait within normal limits incisions doing well appear to be healing well    Laboratory Data:   Lab Results - Last 18 Months   Lab Units 12/01/21  0509 11/29/21  0904 08/23/21  1610 07/27/21  1531 07/06/21  1447   GLUCOSE mg/dL 116* 90 103* 116*  --    BUN mg/dL 13 17 15 17 24   CREATININE mg/dL 0.99 1.06* 1.13* 1.15* 1.2   SODIUM mmol/L 140 139 141 141 138   POTASSIUM mmol/L 4.6 3.9 4.2 4.2 4.1   CHLORIDE mmol/L 106 101 106 106 106   TOTAL CO2 mmol/L  --   --   --   --  25   CO2 mmol/L 24.0 28.0 25.0 28.0  --    CALCIUM mg/dL 9.3 9.5 9.1 8.6 8.5*   TOTAL PROTEIN g/dL  --  8.5 7.4  --   --    ALBUMIN g/dL  --  4.20 3.90  --  3.7   ALT (SGPT) U/L  --  18 14  --  12   AST (SGOT) U/L  --  14 16  --  15   ALK PHOS U/L  --  113 88  --  93   BILIRUBIN mg/dL  --  0.3 0.2  --  0.24*   EGFR IF NONAFRICN AM mL/min/1.73 61 56* 52* 51*  --    GLOBULIN gm/dL  --  4.3 3.5  --   --    A/G RATIO g/dL  --  1.0 1.1  --   --    BUN / CREAT RATIO  13.1 16.0 13.3 14.8  --    ANION GAP mmol/L 10.0 10.0 10.0 7.0  --      Lab Results - Last 18 Months   Lab Units 12/06/21  1004 12/01/21  0509 11/29/21  0904 10/04/21  1344 08/23/21  1610   WBC 10*3/mm3 10.60 16.09* 7.75 8.45 9.28   RBC 10*6/mm3 4.52 4.18 4.89 4.50 4.33   HEMOGLOBIN g/dL 12.3 11.2* 13.2 11.8* 11.2*   HEMATOCRIT % 38.6 35.0 41.1 36.7 36.2   MCV fL 85.4 83.7 84.0 81.6 83.6   MCH pg 27.2 26.8 27.0 26.2* 25.9*   MCHC g/dL 31.9 32.0 32.1 32.2 30.9*   RDW % 16.6* 17.1* 16.4* 14.9 16.3*   RDW-SD fl 51.6 52.4 50.4 43.8 49.2   MPV fL 9.5 10.5 9.9 9.6 10.3   PLATELETS 10*3/mm3 459* 370 425 433 433     Lab Results - Last 18 Months   Lab Units  07/27/21  1721   HCG QUALITATIVE  Negative       Assessment        Diagnosis Plan   1. Postoperative state   see back in 1 week         Plan       No orders of the defined types were placed in this encounter.            This document has been electronically signed by Johnathan Younger MD on December 9, 2021 21:30 CST    Please note that portions of this note were completed with a voice recognition program.

## 2021-12-13 ENCOUNTER — OFFICE VISIT (OUTPATIENT)
Dept: GASTROENTEROLOGY | Facility: CLINIC | Age: 46
End: 2021-12-13

## 2021-12-13 VITALS
SYSTOLIC BLOOD PRESSURE: 148 MMHG | WEIGHT: 293 LBS | BODY MASS INDEX: 47.09 KG/M2 | HEIGHT: 66 IN | DIASTOLIC BLOOD PRESSURE: 98 MMHG | HEART RATE: 88 BPM

## 2021-12-13 DIAGNOSIS — K21.00 GASTROESOPHAGEAL REFLUX DISEASE WITH ESOPHAGITIS WITHOUT HEMORRHAGE: Primary | ICD-10-CM

## 2021-12-13 PROCEDURE — 99214 OFFICE O/P EST MOD 30 MIN: CPT | Performed by: NURSE PRACTITIONER

## 2021-12-13 RX ORDER — SUCRALFATE 1 G/1
1 TABLET ORAL 4 TIMES DAILY
Qty: 120 TABLET | Refills: 2 | Status: SHIPPED | OUTPATIENT
Start: 2021-12-13 | End: 2022-05-26 | Stop reason: SDUPTHER

## 2021-12-13 NOTE — PROGRESS NOTES
Chief Complaint   Patient presents with   • Heartburn       Subjective    Michelle Lange is a 46 y.o. female. she is here today for follow-up.    History of Present Illness  46-year-old female presents for follow-up regarding reflux at last visit we started her on Protonix which was not improving her symptoms at all so she recently changed to Dexilant and has had better improvement but still reports about every other day will have breakthrough reflux symptoms.  Reports intermittent nausea. recently had hysterectomy about 2 weeks ago still has some abdominal tenderness from that recently finished antibiotic.      The following portions of the patient's history were reviewed and updated as appropriate:   Past Medical History:   Diagnosis Date   • Anemia    • Anxiety and depression    • Asthma    • Excessive or frequent menstruation    • GERD (gastroesophageal reflux disease)    • History of mammogram 2016   • Hypertension    • Migraines    • Otitis media    • Otorrhea    • Plantar fasciitis      Past Surgical History:   Procedure Laterality Date   •  SECTION     • CHOLECYSTECTOMY     • COLONOSCOPY N/A 2021    Procedure: COLONOSCOPY;  Surgeon: Michelle Lemus MD;  Location: [facility] ENDOSCOPY;  Service: Gastroenterology;  Laterality: N/A;   • ENDOSCOPY N/A 2021    Procedure: ESOPHAGOGASTRODUODENOSCOPY;  Surgeon: Michelle Lemus MD;  Location: [facility] ENDOSCOPY;  Service: Gastroenterology;  Laterality: N/A;   • GASTRIC SLEEVE LAPAROSCOPIC     • INJECTION OF MEDICATION  2015    Kenalog (1)     • KNEE SURGERY Right    • LAPAROSCOPIC ASSISTED VAGINAL HYSTERECTOMY N/A 2021    Procedure: LAPAROSCOPIC ASSISTED VAGINAL HYSTERECTOMY with salpingectomy; cystoscopy;  Surgeon: Johnathan Younger MD;  Location: [facility] OR;  Service: Obstetrics/Gynecology;  Laterality: N/A;   • OTHER SURGICAL HISTORY  2014    Hysteroscope procedure (1)    Exam under anesthesia, diagnostic hysteroscopy  with fractional dilation and curettage and NovaSure endometrial ablation.    • TUBAL ABDOMINAL LIGATION     • UPPER GASTROINTESTINAL ENDOSCOPY  09/28/2021     Family History   Problem Relation Age of Onset   • Diabetes Other    • Heart disease Other    • Hypertension Other    • Stroke Other    • Seizures Other    • Heart disease Mother    • Hypertension Mother    • Diabetes Father    • Heart disease Father    • Hypertension Father    • Osteoporosis Father    • Diabetes Maternal Grandmother    • Hypertension Maternal Grandmother    • Diabetes Paternal Grandmother    • Heart disease Paternal Grandmother    • Hypertension Paternal Grandmother    • Osteoporosis Paternal Grandmother      OB History    No obstetric history on file.       Prior to Admission medications    Medication Sig Start Date End Date Taking? Authorizing Provider   acetaminophen (TYLENOL) 500 MG tablet Take 2 tablets by mouth Every 8 (Eight) Hours for 14 days. 12/1/21 12/15/21 Yes Johnathan Younger MD   albuterol (PROVENTIL HFA;VENTOLIN HFA) 108 (90 Base) MCG/ACT inhaler Inhale 2 puffs Every 4 (Four) Hours As Needed for Wheezing.   Yes Emergency, Nurse CHRISTIE Ge   amLODIPine (NORVASC) 10 MG tablet Take 10 mg by mouth Daily. 4/13/21  Yes ProviderWeston MD   amoxicillin-clavulanate (Augmentin) 875-125 MG per tablet Take 1 tablet by mouth Every 12 (Twelve) Hours. 12/3/21  Yes Johnathan Younger MD   buPROPion XL (WELLBUTRIN XL) 150 MG 24 hr tablet Take 150 mg by mouth Every Night. 5/12/21  Yes ProviderWeston MD   butalbital-acetaminophen-caffeine (FIORICET, ESGIC) -40 MG per tablet Take 1 tablet by mouth Every 4 (Four) Hours As Needed. 7/10/20  Yes Emergency, Nurse CHRISTIE Ge   cyanocobalamin (VITAMIN B-12) 1000 MCG tablet Take 1 tablet by mouth Daily. 8/3/21  Yes Bg Segura MD   dexlansoprazole (DEXILANT) 60 MG capsule Take 1 capsule by mouth Daily for 180 days.  Patient taking differently: Take 60 mg by mouth Every Night. 11/16/21  "5/15/22 Yes Paulina Guzmán APRN   ergocalciferol (ERGOCALCIFEROL) 1.25 MG (71308 UT) capsule Take 50,000 Units by mouth. 7/9/21 7/10/22 Yes Weston Rose MD   folic acid (FOLVITE) 1 MG tablet Take 1 tablet by mouth Daily. 8/3/21  Yes Bg Segura MD   gabapentin (NEURONTIN) 600 MG tablet Take 300 mg by mouth 4 (Four) Times a Day. 7/10/20  Yes Emergency, Nurse Epic, RN   ondansetron (ZOFRAN) 4 MG tablet Take 1 tablet by mouth Every 6 (Six) Hours As Needed for Nausea or Vomiting. 12/1/21  Yes Johnathan Younger MD   oxyCODONE (ROXICODONE) 10 MG tablet Take 1 tablet by mouth Every 4 (Four) Hours As Needed for Moderate Pain . 12/6/21  Yes Johnathan Younger MD   tiZANidine (ZANAFLEX) 4 MG tablet Take 4 mg by mouth Every 8 (Eight) Hours As Needed. 10/25/21  Yes ProviderWeston MD     Allergies   Allergen Reactions   • Ibuprofen Anaphylaxis   • Phenergan [Promethazine] Other (See Comments)     Pt states arms and legs jump uncontrollably      Social History     Socioeconomic History   • Marital status:    Tobacco Use   • Smoking status: Never Smoker   • Smokeless tobacco: Never Used   Vaping Use   • Vaping Use: Never used   Substance and Sexual Activity   • Alcohol use: No   • Drug use: Never   • Sexual activity: Yes     Partners: Male     Birth control/protection: Surgical       Review of Systems  Review of Systems   Constitutional: Negative for activity change, appetite change, chills, diaphoresis, fatigue, fever and unexpected weight change.   HENT: Negative for sore throat and trouble swallowing.    Respiratory: Negative for shortness of breath.    Gastrointestinal: Negative for abdominal distention, abdominal pain, anal bleeding, blood in stool, constipation, diarrhea, nausea, rectal pain and vomiting.   Musculoskeletal: Negative for arthralgias.   Skin: Negative for pallor.   Neurological: Negative for light-headedness.        /98 (BP Location: Left arm)   Pulse 88   Ht 167.6 cm (66\")   Wt " (!) 140 kg (309 lb 6.4 oz)   BMI 49.94 kg/m²     Objective    Physical Exam  Constitutional:       General: She is not in acute distress.     Appearance: Normal appearance. She is normal weight. She is not ill-appearing.   HENT:      Head: Normocephalic and atraumatic.   Pulmonary:      Effort: Pulmonary effort is normal.   Abdominal:      General: Bowel sounds are normal. There is no distension.      Palpations: Abdomen is soft. There is no mass.      Tenderness: There is abdominal tenderness in the epigastric area.   Neurological:      Mental Status: She is alert.       Lab on 12/06/2021   Component Date Value Ref Range Status   • C-Reactive Protein 12/06/2021 2.86* 0.00 - 0.50 mg/dL Final     Assessment/Plan      1. Gastroesophageal reflux disease with esophagitis without hemorrhage    .   Continue Dexilant daily will add Carafate before meals and bedtime discussed importance of standard antireflux measures.  Follow-up in 3 months for recheck return office sooner if needed    Orders placed during this encounter include:  No orders of the defined types were placed in this encounter.      * Surgery not found *    Review and/or summary of lab tests, radiology, procedures, medications. Review and summary of old records and obtaining of history. The risks and benefits of my recommendations, as well as other treatment options were discussed with the patient today. Questions were answered.    New Medications Ordered This Visit   Medications   • sucralfate (Carafate) 1 g tablet     Sig: Take 1 tablet by mouth 4 (Four) Times a Day.     Dispense:  120 tablet     Refill:  2       Follow-up: Return in about 3 months (around 3/13/2022) for Recheck.          This document has been electronically signed by PORTIA Montelongo on December 13, 2021 16:21 CST           I spent 16 minutes caring for Michelle on this date of service. This time includes time spent by me in the following activities:preparing for the visit, reviewing  tests, obtaining and/or reviewing a separately obtained history, performing a medically appropriate examination and/or evaluation , counseling and educating the patient/family/caregiver, ordering medications, tests, or procedures, referring and communicating with other health care professionals , documenting information in the medical record and care coordination    Results for orders placed or performed in visit on 12/06/21   C-reactive Protein    Specimen: Blood   Result Value Ref Range    C-Reactive Protein 2.86 (H) 0.00 - 0.50 mg/dL   Results for orders placed or performed in visit on 12/06/21   CBC Auto Differential    Specimen: Blood   Result Value Ref Range    WBC 10.60 3.40 - 10.80 10*3/mm3    RBC 4.52 3.77 - 5.28 10*6/mm3    Hemoglobin 12.3 12.0 - 15.9 g/dL    Hematocrit 38.6 34.0 - 46.6 %    MCV 85.4 79.0 - 97.0 fL    MCH 27.2 26.6 - 33.0 pg    MCHC 31.9 31.5 - 35.7 g/dL    RDW 16.6 (H) 12.3 - 15.4 %    RDW-SD 51.6 37.0 - 54.0 fl    MPV 9.5 6.0 - 12.0 fL    Platelets 459 (H) 140 - 450 10*3/mm3    Neutrophil % 56.9 42.7 - 76.0 %    Lymphocyte % 27.2 19.6 - 45.3 %    Monocyte % 8.5 5.0 - 12.0 %    Eosinophil % 6.1 0.3 - 6.2 %    Basophil % 0.6 0.0 - 1.5 %    Immature Grans % 0.7 (H) 0.0 - 0.5 %    Neutrophils, Absolute 6.04 1.70 - 7.00 10*3/mm3    Lymphocytes, Absolute 2.88 0.70 - 3.10 10*3/mm3    Monocytes, Absolute 0.90 0.10 - 0.90 10*3/mm3    Eosinophils, Absolute 0.65 (H) 0.00 - 0.40 10*3/mm3    Basophils, Absolute 0.06 0.00 - 0.20 10*3/mm3    Immature Grans, Absolute 0.07 (H) 0.00 - 0.05 10*3/mm3    nRBC 0.0 0.0 - 0.2 /100 WBC   Results for orders placed or performed during the hospital encounter of 11/30/21   PREVIOUS HISTORY    Specimen: Blood   Result Value Ref Range    Previous History Previous Record on File    PREVIOUS HISTORY    Specimen: Blood   Result Value Ref Range    Previous History Previous Record on File    Tissue Pathology Exam    Specimen: Uterus, Cervix, Bilateral Fallopian Tubes ;  Tissue   Result Value Ref Range    Case Report       Surgical Pathology Report                         Case: [id number]                                  Authorizing Provider:  Johnathan Younger MD        Collected:           11/30/2021 10:27 AM          Ordering Location:     [facility]   Received:            11/30/2021 02:16 PM                                 [facility]                                                              Pathologist:           Ady Olivera MD                                                           Specimen:    Uterus, Cervix, Bilateral Fallopian Tubes , Right tube tagged; super cervical                       performed during procedure                                                                 Final Diagnosis       SEE SCANNED REPORT       Pregnancy, Urine -    Specimen: Urine   Result Value Ref Range    HCG, Urine QL Negative Negative   CBC (No Diff)    Specimen: Blood   Result Value Ref Range    WBC 16.09 (H) 3.40 - 10.80 10*3/mm3    RBC 4.18 3.77 - 5.28 10*6/mm3    Hemoglobin 11.2 (L) 12.0 - 15.9 g/dL    Hematocrit 35.0 34.0 - 46.6 %    MCV 83.7 79.0 - 97.0 fL    MCH 26.8 26.6 - 33.0 pg    MCHC 32.0 31.5 - 35.7 g/dL    RDW 17.1 (H) 12.3 - 15.4 %    RDW-SD 52.4 37.0 - 54.0 fl    MPV 10.5 6.0 - 12.0 fL    Platelets 370 140 - 450 10*3/mm3   Type & Screen    Specimen: Blood   Result Value Ref Range    ABO Type O     RH type Positive     Antibody Screen Negative     T&S Expiration Date 12/3/2021 11:59:59 PM    Basic Metabolic Panel    Specimen: Blood   Result Value Ref Range    Glucose 116 (H) 65 - 99 mg/dL    BUN 13 6 - 20 mg/dL    Creatinine 0.99 0.57 - 1.00 mg/dL    Sodium 140 136 - 145 mmol/L    Potassium 4.6 3.5 - 5.2 mmol/L    Chloride 106 98 - 107 mmol/L    CO2 24.0 22.0 - 29.0 mmol/L    Calcium 9.3 8.6 - 10.5 mg/dL    eGFR Non African Amer 61 >60 mL/min/1.73    BUN/Creatinine Ratio 13.1 7.0 - 25.0    Anion Gap 10.0 5.0 - 15.0 mmol/L   Results for orders  placed or performed in visit on 11/29/21   PREVIOUS HISTORY    Specimen: Blood   Result Value Ref Range    Previous History Patient needs ABO/RH on day of surgery.    CBC Auto Differential    Specimen: Blood   Result Value Ref Range    WBC 7.75 3.40 - 10.80 10*3/mm3    RBC 4.89 3.77 - 5.28 10*6/mm3    Hemoglobin 13.2 12.0 - 15.9 g/dL    Hematocrit 41.1 34.0 - 46.6 %    MCV 84.0 79.0 - 97.0 fL    MCH 27.0 26.6 - 33.0 pg    MCHC 32.1 31.5 - 35.7 g/dL    RDW 16.4 (H) 12.3 - 15.4 %    RDW-SD 50.4 37.0 - 54.0 fl    MPV 9.9 6.0 - 12.0 fL    Platelets 425 140 - 450 10*3/mm3    Neutrophil % 60.7 42.7 - 76.0 %    Lymphocyte % 26.1 19.6 - 45.3 %    Monocyte % 9.7 5.0 - 12.0 %    Eosinophil % 2.5 0.3 - 6.2 %    Basophil % 0.5 0.0 - 1.5 %    Immature Grans % 0.5 0.0 - 0.5 %    Neutrophils, Absolute 4.71 1.70 - 7.00 10*3/mm3    Lymphocytes, Absolute 2.02 0.70 - 3.10 10*3/mm3    Monocytes, Absolute 0.75 0.10 - 0.90 10*3/mm3    Eosinophils, Absolute 0.19 0.00 - 0.40 10*3/mm3    Basophils, Absolute 0.04 0.00 - 0.20 10*3/mm3    Immature Grans, Absolute 0.04 0.00 - 0.05 10*3/mm3    nRBC 0.0 0.0 - 0.2 /100 WBC   Type & Screen    Specimen: Blood   Result Value Ref Range    ABO Type O     RH type Positive     Antibody Screen Negative     T&S Expiration Date 12/2/2021 11:59:59 PM    Comprehensive Metabolic Panel    Specimen: Blood   Result Value Ref Range    Glucose 90 65 - 99 mg/dL    BUN 17 6 - 20 mg/dL    Creatinine 1.06 (H) 0.57 - 1.00 mg/dL    Sodium 139 136 - 145 mmol/L    Potassium 3.9 3.5 - 5.2 mmol/L    Chloride 101 98 - 107 mmol/L    CO2 28.0 22.0 - 29.0 mmol/L    Calcium 9.5 8.6 - 10.5 mg/dL    Total Protein 8.5 6.0 - 8.5 g/dL    Albumin 4.20 3.50 - 5.20 g/dL    ALT (SGPT) 18 1 - 33 U/L    AST (SGOT) 14 1 - 32 U/L    Alkaline Phosphatase 113 39 - 117 U/L    Total Bilirubin 0.3 0.0 - 1.2 mg/dL    eGFR Non African Amer 56 (L) >60 mL/min/1.73    Globulin 4.3 gm/dL    A/G Ratio 1.0 g/dL    BUN/Creatinine Ratio 16.0 7.0 - 25.0     Anion Gap 10.0 5.0 - 15.0 mmol/L     *Note: Due to a large number of results and/or encounters for the requested time period, some results have not been displayed. A complete set of results can be found in Results Review.

## 2021-12-13 NOTE — PATIENT INSTRUCTIONS
MyPlate from USDA    MyPlate is an outline of a general healthy diet based on the 2010 Dietary Guidelines for Americans, from the U.S. Department of Agriculture (USDA). It sets guidelines for how much food you should eat from each food group based on your age, sex, and level of physical activity.  What are tips for following MyPlate?  To follow MyPlate recommendations:  · Eat a wide variety of fruits and vegetables, grains, and protein foods.  · Serve smaller portions and eat less food throughout the day.  · Limit portion sizes to avoid overeating.  · Enjoy your food.  · Get at least 150 minutes of exercise every week. This is about 30 minutes each day, 5 or more days per week.  It can be difficult to have every meal look like MyPlate. Think about MyPlate as eating guidelines for an entire day, rather than each individual meal.  Fruits and vegetables  · Make half of your plate fruits and vegetables.  · Eat many different colors of fruits and vegetables each day.  · For a 2,000 calorie daily food plan, eat:  ? 2½ cups of vegetables every day.  ? 2 cups of fruit every day.  · 1 cup is equal to:  ? 1 cup raw or cooked vegetables.  ? 1 cup raw fruit.  ? 1 medium-sized orange, apple, or banana.  ? 1 cup 100% fruit or vegetable juice.  ? 2 cups raw leafy greens, such as lettuce, spinach, or kale.  ? ½ cup dried fruit.  Grains  · One fourth of your plate should be grains.  · Make at least half of the grains you eat each day whole grains.  · For a 2,000 calorie daily food plan, eat 6 oz of grains every day.  · 1 oz is equal to:  ? 1 slice bread.  ? 1 cup cereal.  ? ½ cup cooked rice, cereal, or pasta.  Protein  · One fourth of your plate should be protein.  · Eat a wide variety of protein foods, including meat, poultry, fish, eggs, beans, nuts, and tofu.  · For a 2,000 calorie daily food plan, eat 5½ oz of protein every day.  · 1 oz is equal to:  ? 1 oz meat, poultry, or fish.  ? ¼ cup cooked beans.  ? 1 egg.  ? ½ oz nuts  or seeds.  ? 1 Tbsp peanut butter.  Dairy  · Drink fat-free or low-fat (1%) milk.  · Eat or drink dairy as a side to meals.  · For a 2,000 calorie daily food plan, eat or drink 3 cups of dairy every day.  · 1 cup is equal to:  ? 1 cup milk, yogurt, cottage cheese, or soy milk (soy beverage).  ? 2 oz processed cheese.  ? 1½ oz natural cheese.  Fats, oils, salt, and sugars  · Only small amounts of oils are recommended.  · Avoid foods that are high in calories and low in nutritional value (empty calories), like foods high in fat or added sugars.  · Choose foods that are low in salt (sodium). Choose foods that have less than 140 milligrams (mg) of sodium per serving.  · Drink water instead of sugary drinks. Drink enough water each day to keep your urine pale yellow.  Where to find support  · Work with your health care provider or a nutrition specialist (dietitian) to develop a customized eating plan that is right for you.  · Download an morales (mobile application) to help you track your daily food intake.  Where to find more information  · Go to ChooseMyPlate.gov for more information.  Summary  · MyPlate is a general guideline for healthy eating from the USDA. It is based on the 2010 Dietary Guidelines for Americans.  · In general, fruits and vegetables should take up ½ of your plate, grains should take up ¼ of your plate, and protein should take up ¼ of your plate.  This information is not intended to replace advice given to you by your health care provider. Make sure you discuss any questions you have with your health care provider.  Document Revised: 05/21/2020 Document Reviewed: 03/19/2018  Elsevier Patient Education © 2021 [employer] Inc.

## 2021-12-17 ENCOUNTER — TELEPHONE (OUTPATIENT)
Dept: OBSTETRICS AND GYNECOLOGY | Facility: CLINIC | Age: 46
End: 2021-12-17
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
## 2021-12-21 ENCOUNTER — LAB (OUTPATIENT)
Dept: LAB | Facility: HOSPITAL | Age: 46
End: 2021-12-21

## 2021-12-21 ENCOUNTER — OFFICE VISIT (OUTPATIENT)
Dept: OBSTETRICS AND GYNECOLOGY | Facility: CLINIC | Age: 46
End: 2021-12-21

## 2021-12-21 ENCOUNTER — TELEPHONE (OUTPATIENT)
Dept: OBSTETRICS AND GYNECOLOGY | Facility: CLINIC | Age: 46
End: 2021-12-21

## 2021-12-21 ENCOUNTER — APPOINTMENT (OUTPATIENT)
Dept: CT IMAGING | Facility: HOSPITAL | Age: 46
End: 2021-12-21

## 2021-12-21 VITALS
DIASTOLIC BLOOD PRESSURE: 84 MMHG | OXYGEN SATURATION: 98 % | HEIGHT: 66 IN | SYSTOLIC BLOOD PRESSURE: 128 MMHG | TEMPERATURE: 98.4 F | HEART RATE: 84 BPM | WEIGHT: 293 LBS | BODY MASS INDEX: 47.09 KG/M2

## 2021-12-21 DIAGNOSIS — G89.18 POSTOPERATIVE ABDOMINAL PAIN: Primary | ICD-10-CM

## 2021-12-21 DIAGNOSIS — G89.18 POSTOPERATIVE ABDOMINAL PAIN: ICD-10-CM

## 2021-12-21 DIAGNOSIS — R10.9 POSTOPERATIVE ABDOMINAL PAIN: Primary | ICD-10-CM

## 2021-12-21 DIAGNOSIS — R10.9 POSTOPERATIVE ABDOMINAL PAIN: ICD-10-CM

## 2021-12-21 DIAGNOSIS — N89.8 VAGINAL DISCHARGE: ICD-10-CM

## 2021-12-21 LAB
ALBUMIN SERPL-MCNC: 4 G/DL (ref 3.5–5.2)
ALBUMIN/GLOB SERPL: 1.1 G/DL
ALP SERPL-CCNC: 98 U/L (ref 39–117)
ALT SERPL W P-5'-P-CCNC: 11 U/L (ref 1–33)
ANION GAP SERPL CALCULATED.3IONS-SCNC: 8 MMOL/L (ref 5–15)
AST SERPL-CCNC: 11 U/L (ref 1–32)
BASOPHILS # BLD AUTO: 0.07 10*3/MM3 (ref 0–0.2)
BASOPHILS NFR BLD AUTO: 0.9 % (ref 0–1.5)
BILIRUB SERPL-MCNC: 0.2 MG/DL (ref 0–1.2)
BUN SERPL-MCNC: 18 MG/DL (ref 6–20)
BUN/CREAT SERPL: 14.5 (ref 7–25)
CALCIUM SPEC-SCNC: 9.4 MG/DL (ref 8.6–10.5)
CHLORIDE SERPL-SCNC: 108 MMOL/L (ref 98–107)
CO2 SERPL-SCNC: 27 MMOL/L (ref 22–29)
CREAT SERPL-MCNC: 1.24 MG/DL (ref 0.57–1)
DEPRECATED RDW RBC AUTO: 51.5 FL (ref 37–54)
EOSINOPHIL # BLD AUTO: 0.41 10*3/MM3 (ref 0–0.4)
EOSINOPHIL NFR BLD AUTO: 5.4 % (ref 0.3–6.2)
ERYTHROCYTE [DISTWIDTH] IN BLOOD BY AUTOMATED COUNT: 16.5 % (ref 12.3–15.4)
GFR SERPL CREATININE-BSD FRML MDRD: 47 ML/MIN/1.73
GLOBULIN UR ELPH-MCNC: 3.5 GM/DL
GLUCOSE SERPL-MCNC: 130 MG/DL (ref 65–99)
HCT VFR BLD AUTO: 36.6 % (ref 34–46.6)
HGB BLD-MCNC: 11.7 G/DL (ref 12–15.9)
IMM GRANULOCYTES # BLD AUTO: 0.03 10*3/MM3 (ref 0–0.05)
IMM GRANULOCYTES NFR BLD AUTO: 0.4 % (ref 0–0.5)
LYMPHOCYTES # BLD AUTO: 2.78 10*3/MM3 (ref 0.7–3.1)
LYMPHOCYTES NFR BLD AUTO: 36.5 % (ref 19.6–45.3)
MCH RBC QN AUTO: 27.1 PG (ref 26.6–33)
MCHC RBC AUTO-ENTMCNC: 32 G/DL (ref 31.5–35.7)
MCV RBC AUTO: 84.7 FL (ref 79–97)
MONOCYTES # BLD AUTO: 0.77 10*3/MM3 (ref 0.1–0.9)
MONOCYTES NFR BLD AUTO: 10.1 % (ref 5–12)
NEUTROPHILS NFR BLD AUTO: 3.55 10*3/MM3 (ref 1.7–7)
NEUTROPHILS NFR BLD AUTO: 46.7 % (ref 42.7–76)
NRBC BLD AUTO-RTO: 0 /100 WBC (ref 0–0.2)
PLATELET # BLD AUTO: 475 10*3/MM3 (ref 140–450)
PMV BLD AUTO: 10.1 FL (ref 6–12)
POTASSIUM SERPL-SCNC: 4.1 MMOL/L (ref 3.5–5.2)
PROT SERPL-MCNC: 7.5 G/DL (ref 6–8.5)
RBC # BLD AUTO: 4.32 10*6/MM3 (ref 3.77–5.28)
SODIUM SERPL-SCNC: 143 MMOL/L (ref 136–145)
WBC NRBC COR # BLD: 7.61 10*3/MM3 (ref 3.4–10.8)

## 2021-12-21 PROCEDURE — 99024 POSTOP FOLLOW-UP VISIT: CPT | Performed by: OBSTETRICS & GYNECOLOGY

## 2021-12-21 PROCEDURE — 36415 COLL VENOUS BLD VENIPUNCTURE: CPT | Performed by: OBSTETRICS & GYNECOLOGY

## 2021-12-21 PROCEDURE — 80053 COMPREHEN METABOLIC PANEL: CPT

## 2021-12-21 PROCEDURE — 85025 COMPLETE CBC W/AUTO DIFF WBC: CPT | Performed by: OBSTETRICS & GYNECOLOGY

## 2021-12-21 NOTE — TELEPHONE ENCOUNTER
Patient called to leave a message for Dr. Younger. She stated she is not coming back to drink the liquid for the CT scan because it makes her sick and that she will be going to [place] instead. I advised Dr. Younger of her message.    Sussy

## 2021-12-21 NOTE — TELEPHONE ENCOUNTER
PER DR. EDITH CAUPTO, THE DR ON CALL, I CALLED THE PT BACK AND PUT HER ON SCHEDULE AT 1 PM TO BE SEEN FOR PO PROBLEM.

## 2021-12-21 NOTE — TELEPHONE ENCOUNTER
[facility] but then apparently went to CT scan without having her blood drawn.  They sent her to the lab and CBC is back.  I called CT to see how she was doing.  They said she was refusing to drink her oral contrast.  I went down to talk to her and when I went to find her she had left by elopement.  I left on her voicemail to come to the hospital and have her CT scan done and to call me on my cell phone
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
## 2021-12-21 NOTE — PROGRESS NOTES
Michelle Lange is a 46 y.o. y/o female.     Chief Complaint: Vaginal discharge with foul odor    HPI:   46 y.o. No obstetric history on file..  No LMP recorded. Patient has had a hysterectomy..  Patient had LAVH 11/30/2021.  Had been doing well until last night she reported sudden discharge of large amount of foul-smelling material per vagina that field 3 adult diapers.  She did not call anyone last night or come to the emergency room.  She had some relatively mild lower abdominal pain denies fever chills or sweats.  Initially it was my understanding that she was continuing to have large amounts of this discharge but on examination I was unable to find any discharge and I specifically asked her during the examination and she clarified that she had had only 4 pads partially filled today thus far.          Review of Systems     Constitutional: Denies night sweats    HENT: No hearing changes, denies ear pain    Eye: No eye pain; no foreign body in eye    Pulmonary: No hemoptysis    Cardiovascular: No claudication    GI: No hematemesis    Musculoskeletal: No arthralgias, no joint swelling    Endocrine: No polydipsia or polyuria    Hematologic: Denies any free bleeding    Psychiatric: Denies any delusions    The following portions of the patient's history were reviewed and updated as appropriate: allergies, current medications, past family history, past medical history, past social history, past surgical history and problem list.    Allergies   Allergen Reactions   • Ibuprofen Anaphylaxis   • Phenergan [Promethazine] Other (See Comments)     Pt states arms and legs jump uncontrollably         Outpatient Medications Prior to Visit   Medication Sig Dispense Refill   • albuterol (PROVENTIL HFA;VENTOLIN HFA) 108 (90 Base) MCG/ACT inhaler Inhale 2 puffs Every 4 (Four) Hours As Needed for Wheezing.     • amLODIPine (NORVASC) 10 MG tablet Take 10 mg by mouth Daily.     • buPROPion XL (WELLBUTRIN XL) 150 MG 24 hr tablet  Take 150 mg by mouth Every Night.     • butalbital-acetaminophen-caffeine (FIORICET, ESGIC) -40 MG per tablet Take 1 tablet by mouth Every 4 (Four) Hours As Needed.     • cyanocobalamin (VITAMIN B-12) 1000 MCG tablet Take 1 tablet by mouth Daily. 90 tablet 1   • dexlansoprazole (DEXILANT) 60 MG capsule Take 1 capsule by mouth Daily for 180 days. (Patient taking differently: Take 60 mg by mouth Every Night.) 30 capsule 5   • ergocalciferol (ERGOCALCIFEROL) 1.25 MG (56267 UT) capsule Take 50,000 Units by mouth.     • folic acid (FOLVITE) 1 MG tablet Take 1 tablet by mouth Daily. 90 tablet 1   • gabapentin (NEURONTIN) 600 MG tablet Take 300 mg by mouth 4 (Four) Times a Day.     • ondansetron (ZOFRAN) 4 MG tablet Take 1 tablet by mouth Every 6 (Six) Hours As Needed for Nausea or Vomiting. 30 tablet 2   • oxyCODONE (ROXICODONE) 10 MG tablet Take 1 tablet by mouth Every 4 (Four) Hours As Needed for Moderate Pain . 12 tablet 0   • sucralfate (Carafate) 1 g tablet Take 1 tablet by mouth 4 (Four) Times a Day. 120 tablet 2   • tiZANidine (ZANAFLEX) 4 MG tablet Take 4 mg by mouth Every 8 (Eight) Hours As Needed.       No facility-administered medications prior to visit.        The patient has a family history of   Family History   Problem Relation Age of Onset   • Diabetes Other    • Heart disease Other    • Hypertension Other    • Stroke Other    • Seizures Other    • Heart disease Mother    • Hypertension Mother    • Diabetes Father    • Heart disease Father    • Hypertension Father    • Osteoporosis Father    • Diabetes Maternal Grandmother    • Hypertension Maternal Grandmother    • Diabetes Paternal Grandmother    • Heart disease Paternal Grandmother    • Hypertension Paternal Grandmother    • Osteoporosis Paternal Grandmother         Past Medical History:   Diagnosis Date   • Anemia    • Anxiety and depression    • Asthma    • Excessive or frequent menstruation    • GERD (gastroesophageal reflux disease)    •  History of mammogram 2016   • Hypertension    • Migraines    • Otitis media    • Otorrhea    • Plantar fasciitis         OB History    No obstetric history on file.          Social History     Socioeconomic History   • Marital status:    Tobacco Use   • Smoking status: Never Smoker   • Smokeless tobacco: Never Used   Vaping Use   • Vaping Use: Never used   Substance and Sexual Activity   • Alcohol use: No   • Drug use: Never   • Sexual activity: Yes     Partners: Male     Birth control/protection: Surgical        Past Surgical History:   Procedure Laterality Date   •  SECTION     • CHOLECYSTECTOMY     • COLONOSCOPY N/A 2021    Procedure: COLONOSCOPY;  Surgeon: Michelle Lemus MD;  Location: [facility] ENDOSCOPY;  Service: Gastroenterology;  Laterality: N/A;   • ENDOSCOPY N/A 2021    Procedure: ESOPHAGOGASTRODUODENOSCOPY;  Surgeon: Michelle Lemus MD;  Location: [facility] ENDOSCOPY;  Service: Gastroenterology;  Laterality: N/A;   • GASTRIC SLEEVE LAPAROSCOPIC     • INJECTION OF MEDICATION  2015    Kenalog (1)     • KNEE SURGERY Right    • LAPAROSCOPIC ASSISTED VAGINAL HYSTERECTOMY N/A 2021    Procedure: LAPAROSCOPIC ASSISTED VAGINAL HYSTERECTOMY with salpingectomy; cystoscopy;  Surgeon: Johnathan Younger MD;  Location: [facility] OR;  Service: Obstetrics/Gynecology;  Laterality: N/A;   • OTHER SURGICAL HISTORY  2014    Hysteroscope procedure (1)    Exam under anesthesia, diagnostic hysteroscopy with fractional dilation and curettage and NovaSure endometrial ablation.    • TUBAL ABDOMINAL LIGATION     • UPPER GASTROINTESTINAL ENDOSCOPY  2021        Patient Active Problem List   Diagnosis   • Acute pain of both knees   • Primary osteoarthritis of both knees   • Chondromalacia of both patellae   • Constipation   • Anemia   • Thrombocytosis   • Adverse effect of iron   • Lower abdominal pain   • Post endometrial ablation syndrome   • S/P laparoscopic assisted vaginal  "hysterectomy (LAVH)        Documented Vitals    12/21/21 1323   BP: 128/84   Pulse: 84   Temp: 98.4 °F (36.9 °C)   SpO2: 98%   Weight: (!) 140 kg (309 lb)   Height: 167.6 cm (66\")        Body mass index is 49.87 kg/m².    Physical Exam  Constitutional: Appears to be in no acute distress; Eyes: Sclerae normal; Endocrine system: Thyroid palpation is normal; Pulmonary system: Lungs clear; Cardiovascular system: Heart regular rate and rhythm; Gastrointestinal system: Abdomen soft and nontender, active bowel sounds; Urologic system: CVA negative; Psychiatric: Appropriate insight; Neurologic: Gait within normal limits female genital system external genitalia normal vagina normal the cup is intact.  I cannot produce any discharge from the cuff with asking the patient to cough though she did not do this deeply.  On bimanual examination there is mild to moderate pain rectal examination not tolerated.  On standing there was no detectable increase in discharge per vagina    Laboratory Data:   Lab Results - Last 18 Months   Lab Units 12/01/21  0509 11/29/21  0904 08/23/21  1610 07/27/21  1531 07/06/21  1447   GLUCOSE mg/dL 116* 90 103* 116*  --    BUN mg/dL 13 17 15 17 24   CREATININE mg/dL 0.99 1.06* 1.13* 1.15* 1.2   SODIUM mmol/L 140 139 141 141 138   POTASSIUM mmol/L 4.6 3.9 4.2 4.2 4.1   CHLORIDE mmol/L 106 101 106 106 106   TOTAL CO2 mmol/L  --   --   --   --  25   CO2 mmol/L 24.0 28.0 25.0 28.0  --    CALCIUM mg/dL 9.3 9.5 9.1 8.6 8.5*   TOTAL PROTEIN g/dL  --  8.5 7.4  --   --    ALBUMIN g/dL  --  4.20 3.90  --  3.7   ALT (SGPT) U/L  --  18 14  --  12   AST (SGOT) U/L  --  14 16  --  15   ALK PHOS U/L  --  113 88  --  93   BILIRUBIN mg/dL  --  0.3 0.2  --  0.24*   EGFR IF NONAFRICN AM mL/min/1.73 61 56* 52* 51*  --    GLOBULIN gm/dL  --  4.3 3.5  --   --    A/G RATIO g/dL  --  1.0 1.1  --   --    BUN / CREAT RATIO  13.1 16.0 13.3 14.8  --    ANION GAP mmol/L 10.0 10.0 10.0 7.0  --      Lab Results - Last 18 Months   Lab " Units 12/06/21  1004 12/01/21  0509 11/29/21  0904 10/04/21  1344 08/23/21  1610   WBC 10*3/mm3 10.60 16.09* 7.75 8.45 9.28   RBC 10*6/mm3 4.52 4.18 4.89 4.50 4.33   HEMOGLOBIN g/dL 12.3 11.2* 13.2 11.8* 11.2*   HEMATOCRIT % 38.6 35.0 41.1 36.7 36.2   MCV fL 85.4 83.7 84.0 81.6 83.6   MCH pg 27.2 26.8 27.0 26.2* 25.9*   MCHC g/dL 31.9 32.0 32.1 32.2 30.9*   RDW % 16.6* 17.1* 16.4* 14.9 16.3*   RDW-SD fl 51.6 52.4 50.4 43.8 49.2   MPV fL 9.5 10.5 9.9 9.6 10.3   PLATELETS 10*3/mm3 459* 370 425 433 433     Lab Results - Last 18 Months   Lab Units 07/27/21  1721   HCG QUALITATIVE  Negative       Assessment        Diagnosis Plan   1. Postoperative abdominal pain  CBC Auto Differential    Comprehensive Metabolic Panel    Urinalysis With Culture If Indicated -    CT Abdomen Pelvis With Contrast   2. Vaginal discharge     I have gone over with the patient and her  some of the diagnostic possibilities and a lot of them depend on the time course of the discharge but that we need to get a CT scan CBC CMP urinalysis and then consider retrofilling the bladder with dye for integrity.  I reviewed with them the importance of getting these done and the possible consequences of not having them done.    Plan       No orders of the defined types were placed in this encounter.            This document has been electronically signed by Johnathan Younger MD on December 21, 2021 14:33 CST    Please note that portions of this note were completed with a voice recognition program.

## 2021-12-21 NOTE — TELEPHONE ENCOUNTER
Patient was seen today please see progress note.  Was supposed to get lab work and CT scan signed up for lab work but then left.  I tried to call patient.  I had reviewed with her how important getting the lab work was.  I left voicemail to call me on my cell phone

## 2021-12-22 ENCOUNTER — TELEPHONE (OUTPATIENT)
Dept: OBSTETRICS AND GYNECOLOGY | Facility: CLINIC | Age: 46
End: 2021-12-22

## 2021-12-27 ENCOUNTER — TELEPHONE (OUTPATIENT)
Dept: OBSTETRICS AND GYNECOLOGY | Facility: CLINIC | Age: 46
End: 2021-12-27

## 2021-12-28 ENCOUNTER — OFFICE VISIT (OUTPATIENT)
Dept: PODIATRY | Facility: CLINIC | Age: 46
End: 2021-12-28

## 2021-12-28 VITALS — HEART RATE: 78 BPM | HEIGHT: 66 IN | BODY MASS INDEX: 47.09 KG/M2 | WEIGHT: 293 LBS | OXYGEN SATURATION: 100 %

## 2021-12-28 DIAGNOSIS — M72.2 PLANTAR FASCIITIS: Primary | ICD-10-CM

## 2021-12-28 PROCEDURE — 99213 OFFICE O/P EST LOW 20 MIN: CPT | Performed by: PODIATRIST

## 2021-12-28 RX ORDER — METHYLPREDNISOLONE 4 MG/1
TABLET ORAL
Qty: 21 TABLET | Refills: 0 | Status: SHIPPED | OUTPATIENT
Start: 2021-12-28 | End: 2022-05-26

## 2021-12-28 RX ORDER — ACETAMINOPHEN 500 MG/1
TABLET, FILM COATED ORAL
COMMUNITY
Start: 2021-12-20 | End: 2022-12-08

## 2021-12-28 NOTE — PROGRESS NOTES
Michelle Lange  1975  46 y.o. female       2021     Chief Complaint   Patient presents with   • Left Foot - Pain   • Right Foot - Pain       History of Present Illness    Michelle Lange is a 46 y.o.female who presents to clinic today with chief complaint of foot pain.  Pain is localized to the bottom of both of her heels.  States that she is doing all the conservative treatments including stretching, icing and good supportive shoes.  She has also had steroid injections in the past.  Nothing seems to be helping.    Past Medical History:   Diagnosis Date   • Anemia    • Anxiety and depression    • Asthma    • Excessive or frequent menstruation    • GERD (gastroesophageal reflux disease)    • History of mammogram 2016   • Hypertension    • Migraines    • Otitis media    • Otorrhea    • Plantar fasciitis          Past Surgical History:   Procedure Laterality Date   •  SECTION     • CHOLECYSTECTOMY     • COLONOSCOPY N/A 2021    Procedure: COLONOSCOPY;  Surgeon: Michelle Lemus MD;  Location: [facility] ENDOSCOPY;  Service: Gastroenterology;  Laterality: N/A;   • ENDOSCOPY N/A 2021    Procedure: ESOPHAGOGASTRODUODENOSCOPY;  Surgeon: Michelle Lemus MD;  Location: [facility] ENDOSCOPY;  Service: Gastroenterology;  Laterality: N/A;   • GASTRIC SLEEVE LAPAROSCOPIC     • INJECTION OF MEDICATION  2015    Kenalog (1)     • KNEE SURGERY Right    • LAPAROSCOPIC ASSISTED VAGINAL HYSTERECTOMY N/A 2021    Procedure: LAPAROSCOPIC ASSISTED VAGINAL HYSTERECTOMY with salpingectomy; cystoscopy;  Surgeon: Johnathan Younger MD;  Location: [facility] OR;  Service: Obstetrics/Gynecology;  Laterality: N/A;   • OTHER SURGICAL HISTORY  2014    Hysteroscope procedure (1)    Exam under anesthesia, diagnostic hysteroscopy with fractional dilation and curettage and NovaSure endometrial ablation.    • TUBAL ABDOMINAL LIGATION     • UPPER GASTROINTESTINAL ENDOSCOPY  2021         Family  History   Problem Relation Age of Onset   • Diabetes Other    • Heart disease Other    • Hypertension Other    • Stroke Other    • Seizures Other    • Heart disease Mother    • Hypertension Mother    • Diabetes Father    • Heart disease Father    • Hypertension Father    • Osteoporosis Father    • Diabetes Maternal Grandmother    • Hypertension Maternal Grandmother    • Diabetes Paternal Grandmother    • Heart disease Paternal Grandmother    • Hypertension Paternal Grandmother    • Osteoporosis Paternal Grandmother        Allergies   Allergen Reactions   • Ibuprofen Anaphylaxis   • Phenergan [Promethazine] Other (See Comments)     Pt states arms and legs jump uncontrollably        Social History     Socioeconomic History   • Marital status:    Tobacco Use   • Smoking status: Never Smoker   • Smokeless tobacco: Never Used   Vaping Use   • Vaping Use: Never used   Substance and Sexual Activity   • Alcohol use: No   • Drug use: Never   • Sexual activity: Yes     Partners: Male     Birth control/protection: Surgical         Current Outpatient Medications   Medication Sig Dispense Refill   • albuterol (PROVENTIL HFA;VENTOLIN HFA) 108 (90 Base) MCG/ACT inhaler Inhale 2 puffs Every 4 (Four) Hours As Needed for Wheezing.     • amLODIPine (NORVASC) 10 MG tablet Take 10 mg by mouth Daily.     • buPROPion XL (WELLBUTRIN XL) 150 MG 24 hr tablet Take 150 mg by mouth Every Night.     • butalbital-acetaminophen-caffeine (FIORICET, ESGIC) -40 MG per tablet Take 1 tablet by mouth Every 4 (Four) Hours As Needed.     • cyanocobalamin (VITAMIN B-12) 1000 MCG tablet Take 1 tablet by mouth Daily. 90 tablet 1   • dexlansoprazole (DEXILANT) 60 MG capsule Take 1 capsule by mouth Daily for 180 days. (Patient taking differently: Take 60 mg by mouth Every Night.) 30 capsule 5   • ergocalciferol (ERGOCALCIFEROL) 1.25 MG (30302 UT) capsule Take 50,000 Units by mouth.     • folic acid (FOLVITE) 1 MG tablet Take 1 tablet by mouth  "Daily. 90 tablet 1   • gabapentin (NEURONTIN) 600 MG tablet Take 300 mg by mouth 4 (Four) Times a Day.     • ondansetron (ZOFRAN) 4 MG tablet Take 1 tablet by mouth Every 6 (Six) Hours As Needed for Nausea or Vomiting. 30 tablet 2   • Pain Relief Extra Strength 500 MG tablet TAKE TWO TABLETS BY MOUTH EVERY 8 HOURS     • sucralfate (Carafate) 1 g tablet Take 1 tablet by mouth 4 (Four) Times a Day. 120 tablet 2   • tiZANidine (ZANAFLEX) 4 MG tablet Take 4 mg by mouth Every 8 (Eight) Hours As Needed.     • methylPREDNISolone (MEDROL) 4 MG dose pack Take as directed 21 tablet 0   • oxyCODONE (ROXICODONE) 10 MG tablet Take 1 tablet by mouth Every 4 (Four) Hours As Needed for Moderate Pain . 12 tablet 0     No current facility-administered medications for this visit.       Review of Systems   Constitutional: Negative.    Eyes: Negative.    Respiratory: Negative.    Cardiovascular: Positive for leg swelling.   Gastrointestinal: Negative.    Musculoskeletal: Positive for arthralgias, back pain and joint swelling.        Foot pain  Ankle pain   Neurological: Positive for dizziness, numbness and headaches.   All other systems reviewed and are negative.        OBJECTIVE    Pulse 78   Ht 167.6 cm (66\")   Wt (!) 140 kg (309 lb)   SpO2 100%   BMI 49.87 kg/m²       Physical Exam  Vitals reviewed.   Constitutional:       General: She is not in acute distress.     Appearance: Normal appearance. She is well-developed. She is not ill-appearing.   HENT:      Head: Normocephalic and atraumatic.      Right Ear: External ear normal.      Left Ear: External ear normal.      Nose: Nose normal.   Eyes:      Conjunctiva/sclera: Conjunctivae normal.      Pupils: Pupils are equal, round, and reactive to light.   Cardiovascular:      Rate and Rhythm: Normal rate.      Pulses: Normal pulses.   Pulmonary:      Effort: Pulmonary effort is normal. No respiratory distress.      Breath sounds: No wheezing.   Musculoskeletal:         General: " Tenderness present. No deformity. Normal range of motion.   Skin:     General: Skin is warm and dry.      Capillary Refill: Capillary refill takes less than 2 seconds.   Neurological:      Mental Status: She is alert and oriented to person, place, and time.   Psychiatric:         Behavior: Behavior normal.         Thought Content: Thought content normal.         Gait: normal     Assistive Device: none    Lower Extremity    Cardiovascular:    DP/PT pulses palpable bilateral  CFT brisk  to all digits  Skin temp is warm to warm from proximal tibia to distal digits bilateral  Musculoskeletal:  Muscle strength is 5/5 for all muscle groups tested   ROM of the 1st MTP is WNL bilateral   ROM of the MTJ is WNL bilateral   ROM of the STJ is WNL bilateral   ROM of the ankle joint is  WNL bilateral   Pain on palpation to the medial tubercle bilateral calcaneus.  Negative lateral squeeze test.  Dermatological:   Skin is warm, dry and intact bilateral  Webspaces 1-4 bilateral are clean, dry and intact.   No subcutaneous nodules or masses noted    Neurological:   Sensation intact to light touch        Procedures      ASSESSMENT AND PLAN    Diagnoses and all orders for this visit:    1. Plantar fasciitis (Primary)    Other orders  -     methylPREDNISolone (MEDROL) 4 MG dose pack; Take as directed  Dispense: 21 tablet; Refill: 0      -Patient with continued heel pain despite adequate conservative care.  Discussed treatment options going forward both conservative and surgical.  Patient elected to remain conservative at this point.  Rx for custom orthotics.  Rx for Medrol Dosepak.  - All questions were answered to the patient's satisfaction.  - RTC as needed             This document has been electronically signed by Ishmael Castañeda DPM on December 29, 2021 15:58 CST     12/29/2021  15:58 CST

## 2021-12-30 ENCOUNTER — TRANSCRIBE ORDERS (OUTPATIENT)
Dept: PODIATRY | Facility: CLINIC | Age: 46
End: 2021-12-30

## 2021-12-30 DIAGNOSIS — M72.2 PLANTAR FASCIITIS: Primary | ICD-10-CM

## 2022-01-05 ENCOUNTER — APPOINTMENT (OUTPATIENT)
Dept: PHYSICAL THERAPY | Facility: HOSPITAL | Age: 47
End: 2022-01-05

## 2022-01-10 ENCOUNTER — APPOINTMENT (OUTPATIENT)
Dept: ONCOLOGY | Facility: HOSPITAL | Age: 47
End: 2022-01-10

## 2022-01-19 ENCOUNTER — LAB (OUTPATIENT)
Dept: ONCOLOGY | Facility: HOSPITAL | Age: 47
End: 2022-01-19

## 2022-01-19 ENCOUNTER — OFFICE VISIT (OUTPATIENT)
Dept: ONCOLOGY | Facility: CLINIC | Age: 47
End: 2022-01-19

## 2022-01-19 VITALS
OXYGEN SATURATION: 99 % | DIASTOLIC BLOOD PRESSURE: 92 MMHG | SYSTOLIC BLOOD PRESSURE: 157 MMHG | TEMPERATURE: 96.1 F | BODY MASS INDEX: 49.52 KG/M2 | WEIGHT: 293 LBS | HEART RATE: 61 BPM

## 2022-01-19 DIAGNOSIS — D75.839 THROMBOCYTOSIS: Chronic | ICD-10-CM

## 2022-01-19 DIAGNOSIS — T45.4X5A ADVERSE EFFECT OF IRON, INITIAL ENCOUNTER: ICD-10-CM

## 2022-01-19 DIAGNOSIS — D50.0 IRON DEFICIENCY ANEMIA DUE TO CHRONIC BLOOD LOSS: ICD-10-CM

## 2022-01-19 DIAGNOSIS — D50.0 IRON DEFICIENCY ANEMIA DUE TO CHRONIC BLOOD LOSS: Primary | Chronic | ICD-10-CM

## 2022-01-19 DIAGNOSIS — D75.839 THROMBOCYTOSIS: ICD-10-CM

## 2022-01-19 DIAGNOSIS — E53.8 FOLATE DEFICIENCY: ICD-10-CM

## 2022-01-19 DIAGNOSIS — T45.4X5A ADVERSE EFFECT OF IRON, INITIAL ENCOUNTER: Chronic | ICD-10-CM

## 2022-01-19 LAB
BASOPHILS # BLD AUTO: 0.06 10*3/MM3 (ref 0–0.2)
BASOPHILS NFR BLD AUTO: 0.7 % (ref 0–1.5)
DEPRECATED RDW RBC AUTO: 48.3 FL (ref 37–54)
EOSINOPHIL # BLD AUTO: 0.3 10*3/MM3 (ref 0–0.4)
EOSINOPHIL NFR BLD AUTO: 3.7 % (ref 0.3–6.2)
ERYTHROCYTE [DISTWIDTH] IN BLOOD BY AUTOMATED COUNT: 15.7 % (ref 12.3–15.4)
FERRITIN SERPL-MCNC: 478.9 NG/ML (ref 13–150)
FOLATE SERPL-MCNC: 7.22 NG/ML (ref 4.78–24.2)
HCT VFR BLD AUTO: 39.1 % (ref 34–46.6)
HGB BLD-MCNC: 12.6 G/DL (ref 12–15.9)
IMM GRANULOCYTES # BLD AUTO: 0.03 10*3/MM3 (ref 0–0.05)
IMM GRANULOCYTES NFR BLD AUTO: 0.4 % (ref 0–0.5)
IRON 24H UR-MRATE: 74 MCG/DL (ref 37–145)
IRON SATN MFR SERPL: 22 % (ref 20–50)
LYMPHOCYTES # BLD AUTO: 2.98 10*3/MM3 (ref 0.7–3.1)
LYMPHOCYTES NFR BLD AUTO: 36.7 % (ref 19.6–45.3)
MCH RBC QN AUTO: 27.5 PG (ref 26.6–33)
MCHC RBC AUTO-ENTMCNC: 32.2 G/DL (ref 31.5–35.7)
MCV RBC AUTO: 85.4 FL (ref 79–97)
MONOCYTES # BLD AUTO: 0.64 10*3/MM3 (ref 0.1–0.9)
MONOCYTES NFR BLD AUTO: 7.9 % (ref 5–12)
NEUTROPHILS NFR BLD AUTO: 4.1 10*3/MM3 (ref 1.7–7)
NEUTROPHILS NFR BLD AUTO: 50.6 % (ref 42.7–76)
NRBC BLD AUTO-RTO: 0 /100 WBC (ref 0–0.2)
PLATELET # BLD AUTO: 382 10*3/MM3 (ref 140–450)
PMV BLD AUTO: 9.8 FL (ref 6–12)
RBC # BLD AUTO: 4.58 10*6/MM3 (ref 3.77–5.28)
TIBC SERPL-MCNC: 329 MCG/DL (ref 298–536)
TRANSFERRIN SERPL-MCNC: 221 MG/DL (ref 200–360)
VIT B12 BLD-MCNC: 326 PG/ML (ref 211–946)
WBC NRBC COR # BLD: 8.11 10*3/MM3 (ref 3.4–10.8)

## 2022-01-19 PROCEDURE — 85025 COMPLETE CBC W/AUTO DIFF WBC: CPT

## 2022-01-19 PROCEDURE — 83540 ASSAY OF IRON: CPT

## 2022-01-19 PROCEDURE — G0463 HOSPITAL OUTPT CLINIC VISIT: HCPCS | Performed by: INTERNAL MEDICINE

## 2022-01-19 PROCEDURE — 82746 ASSAY OF FOLIC ACID SERUM: CPT

## 2022-01-19 PROCEDURE — 1126F AMNT PAIN NOTED NONE PRSNT: CPT | Performed by: INTERNAL MEDICINE

## 2022-01-19 PROCEDURE — 84466 ASSAY OF TRANSFERRIN: CPT

## 2022-01-19 PROCEDURE — 99214 OFFICE O/P EST MOD 30 MIN: CPT | Performed by: INTERNAL MEDICINE

## 2022-01-19 PROCEDURE — 82728 ASSAY OF FERRITIN: CPT

## 2022-01-19 PROCEDURE — 82607 VITAMIN B-12: CPT

## 2022-01-19 RX ORDER — FOLIC ACID 1 MG/1
1 TABLET ORAL DAILY
Qty: 90 TABLET | Refills: 1 | Status: SHIPPED | OUTPATIENT
Start: 2022-01-19 | End: 2023-01-04

## 2022-01-19 NOTE — PROGRESS NOTES
DATE OF VISIT: 1/20/2022      REASON FOR VISIT: Anemia, thrombocytosis        HISTORY OF PRESENT ILLNESS:   46-year-old female with medical problem consisting of hypertension, plantar fasciitis, endometrial ablation in 2014, gastric sleeve surgery done in 2017 who was initially seen in consultation on August 2, 2021 for anemia and thrombocytosis due to iron malabsorption patient received 2 dose of Injectafer that completed in November 2021.  Patient also had a hysterectomy done in November 2021.  Complains of fatigue.  Complains of chronic arthralgias.  Denies any bleeding.  Denies any new lymph node enlargement.          Past Medical History, Past Surgical History, Social History, Family History have been reviewed and are without significant changes except as mentioned.    Review of Systems   A comprehensive 14 point review of systems was performed and was negative except as mentioned in HPI.    Medications:  The current medication list was reviewed in the EMR    ALLERGIES:    Allergies   Allergen Reactions   • Ibuprofen Anaphylaxis   • Phenergan [Promethazine] Other (See Comments)     Pt states arms and legs jump uncontrollably        Objective      Vitals:    01/19/22 1048   BP: 157/92   Pulse: 61   Temp: 96.1 °F (35.6 °C)   TempSrc: Temporal   SpO2: 99%   Weight: (!) 139 kg (306 lb 12.8 oz)   PainSc: 0-No pain     No flowsheet data found.    Physical Exam  Pulmonary:      Breath sounds: Normal breath sounds.   Neurological:      Mental Status: She is alert and oriented to person, place, and time.           RECENT LABS:  Glucose   Date Value Ref Range Status   12/21/2021 130 (H) 65 - 99 mg/dL Final     Sodium   Date Value Ref Range Status   12/21/2021 143 136 - 145 mmol/L Final   07/06/2021 138 136 - 145 mmol/L Final     Potassium   Date Value Ref Range Status   12/21/2021 4.1 3.5 - 5.2 mmol/L Final   07/06/2021 4.1 3.5 - 5.1 mmol/L Final     CO2   Date Value Ref Range Status   12/21/2021 27.0 22.0 - 29.0 mmol/L  Final     Total CO2   Date Value Ref Range Status   07/06/2021 25 21 - 31 mmol/L Final     Chloride   Date Value Ref Range Status   12/21/2021 108 (H) 98 - 107 mmol/L Final   07/06/2021 106 98 - 107 mmol/L Final     Anion Gap   Date Value Ref Range Status   12/21/2021 8.0 5.0 - 15.0 mmol/L Final     Creatinine   Date Value Ref Range Status   12/21/2021 1.24 (H) 0.57 - 1.00 mg/dL Final   07/06/2021 1.2 0.7 - 1.3 mg/dL Final     BUN   Date Value Ref Range Status   12/21/2021 18 6 - 20 mg/dL Final   07/06/2021 24 7 - 25 mg/dL Final     BUN/Creatinine Ratio   Date Value Ref Range Status   12/21/2021 14.5 7.0 - 25.0 Final     Calcium   Date Value Ref Range Status   12/21/2021 9.4 8.6 - 10.5 mg/dL Final   07/06/2021 8.5 (L) 8.6 - 10.3 mg/dL Final     eGFR Non  Amer   Date Value Ref Range Status   12/21/2021 47 (L) >60 mL/min/1.73 Final     Alkaline Phosphatase   Date Value Ref Range Status   12/21/2021 98 39 - 117 U/L Final   07/06/2021 93 34 - 104 U/L Final     Total Protein   Date Value Ref Range Status   12/21/2021 7.5 6.0 - 8.5 g/dL Final     ALT (SGPT)   Date Value Ref Range Status   12/21/2021 11 1 - 33 U/L Final   07/06/2021 12 7 - 52 U/L Final     AST (SGOT)   Date Value Ref Range Status   12/21/2021 11 1 - 32 U/L Final   07/06/2021 15 13 - 39 U/L Final     Total Bilirubin   Date Value Ref Range Status   12/21/2021 0.2 0.0 - 1.2 mg/dL Final   07/06/2021 0.24 (L) 0.3 - 1.0 mg/dL Final     Albumin   Date Value Ref Range Status   12/21/2021 4.00 3.50 - 5.20 g/dL Final   07/06/2021 3.7 3.5 - 5.7 g/dL Final     Globulin   Date Value Ref Range Status   12/21/2021 3.5 gm/dL Final     Lab Results   Component Value Date    WBC 8.11 01/19/2022    HGB 12.6 01/19/2022    HCT 39.1 01/19/2022    MCV 85.4 01/19/2022     01/19/2022     Lab Results   Component Value Date    NEUTROABS 4.10 01/19/2022    IRON 74 01/19/2022    IRON 37 10/04/2021    IRON 31 (L) 08/02/2021    TIBC 329 01/19/2022    TIBC 353 10/04/2021     TIBC 346 08/02/2021    LABIRON 22 01/19/2022    LABIRON 10 (L) 10/04/2021    LABIRON 9 (L) 08/02/2021    FERRITIN 478.90 (H) 01/19/2022    FERRITIN 74.62 10/04/2021    FERRITIN 118.00 08/02/2021    FPEFOUTN84 326 01/19/2022    CDHSXTZX05 349 10/04/2021    MBRAWZQD66 345 08/02/2021    FOLATE 7.22 01/19/2022    FOLATE 5.98 10/04/2021    FOLATE 4.72 (L) 08/02/2021     Lab Results   Component Value Date    HCGQUANT 5.41 (H) 06/01/2018         PATHOLOGY:  * Cannot find OR log *         RADIOLOGY DATA :  No radiology results for the last 7 days        Assessment/Plan     1.  Anemia:  - Anemia work-up done on January 19, 2022 shows hemoglobin is 12.6.  - Due to iron malabsorption patient received Injectafer at that completed on November 2, 2021.  - Iron studies showing adequate amount of iron.  B12 and folate level has not shown any significant improvement.  Patient states she has missed few doses of B12 and folic acids since last clinic visit.  - Due to her gastric bypass status, recommend taking B12 and folic acid religiously.  - Prescription for B12 and folic acid has been sent to her pharmacy today  - We will have patient return to clinic in 3 months with repeat CBC, iron studies, ferritin, B12 and folate to be done on that day.    2.  History of gastric sleeve surgery    3.  Thrombocytosis:  - Reactive due to iron deficiency  - Platelet count is 382,000 today    4.  Folate deficiency  - Folate level is 7.2.  Recommend continue with folic acid 1 mg p.o. daily    5.  Health maintenance: Patient does not smoke.  Had a EGD and colonoscopy in September 2021               PHQ-9 Total Score: 0   -Patient is not homicidal or suicidal.  No acute intervention required.    Michelle Lange reports a pain score of 0.  Given her pain assessment as noted, treatment options were discussed and the following options were decided upon as a follow-up plan to address the patient's pain: continuation of current treatment plan for  pain.         Bg Segura MD  1/20/2022  07:50 CST        Part of this note may be an electronic transcription/translation of spoken language to printed text using the Dragon Dictation System.          CC:

## 2022-01-20 ENCOUNTER — TELEPHONE (OUTPATIENT)
Dept: ONCOLOGY | Facility: HOSPITAL | Age: 47
End: 2022-01-20

## 2022-01-20 NOTE — TELEPHONE ENCOUNTER
Contacted pt regarding lab results and supplements. PT verbalizes understanding and denies any further questions.

## 2022-01-20 NOTE — TELEPHONE ENCOUNTER
----- Message from Bg Segura MD sent at 1/20/2022  7:55 AM CST -----  Please let patient know, her B12 level is decreased to 326.  She needs to start taking B12 p.o. daily until next clinic visit.  Folate level is minimally improved to 7.2.  Recommend continue with folic acid p.o. daily.  Iron studies are showing significant improvement no need for any intravenous iron replacement at present.  Thank you

## 2022-02-02 ENCOUNTER — HOSPITAL ENCOUNTER (OUTPATIENT)
Dept: PHYSICAL THERAPY | Facility: HOSPITAL | Age: 47
Setting detail: THERAPIES SERIES
Discharge: HOME | End: 2022-02-02

## 2022-02-02 DIAGNOSIS — M72.2 PLANTAR FASCIITIS, BILATERAL: Primary | ICD-10-CM

## 2022-02-02 PROCEDURE — 97161 PT EVAL LOW COMPLEX 20 MIN: CPT | Performed by: PHYSICAL THERAPIST

## 2022-02-02 NOTE — THERAPY EVALUATION
Outpatient Physical Therapy Ortho Initial Evaluation  [facility]     Patient Name: Michelle Lange  : 1975  MRN: [id number]  Today's Date: 2022      Visit Date: 2022    Patient Active Problem List   Diagnosis   • Acute pain of both knees   • Primary osteoarthritis of both knees   • Chondromalacia of both patellae   • Constipation   • Anemia   • Thrombocytosis   • Adverse effect of iron   • Lower abdominal pain   • Post endometrial ablation syndrome   • S/P laparoscopic assisted vaginal hysterectomy (LAVH)   • Folate deficiency        Past Medical History:   Diagnosis Date   • Anemia    • Anxiety and depression    • Asthma    • Excessive or frequent menstruation    • GERD (gastroesophageal reflux disease)    • History of mammogram 2016   • Hypertension    • Migraines    • Otitis media    • Otorrhea    • Plantar fasciitis         Past Surgical History:   Procedure Laterality Date   •  SECTION     • CHOLECYSTECTOMY     • COLONOSCOPY N/A 2021    Procedure: COLONOSCOPY;  Surgeon: Michelle Lemus MD;  Location: [facility] ENDOSCOPY;  Service: Gastroenterology;  Laterality: N/A;   • ENDOSCOPY N/A 2021    Procedure: ESOPHAGOGASTRODUODENOSCOPY;  Surgeon: Michelle Lemus MD;  Location: [facility] ENDOSCOPY;  Service: Gastroenterology;  Laterality: N/A;   • GASTRIC SLEEVE LAPAROSCOPIC     • INJECTION OF MEDICATION  2015    Kenalog (1)     • KNEE SURGERY Right    • LAPAROSCOPIC ASSISTED VAGINAL HYSTERECTOMY N/A 2021    Procedure: LAPAROSCOPIC ASSISTED VAGINAL HYSTERECTOMY with salpingectomy; cystoscopy;  Surgeon: Johnathan Younger MD;  Location: [facility] OR;  Service: Obstetrics/Gynecology;  Laterality: N/A;   • OTHER SURGICAL HISTORY  2014    Hysteroscope procedure (1)    Exam under anesthesia, diagnostic hysteroscopy with fractional dilation and curettage and NovaSure endometrial ablation.    • TUBAL ABDOMINAL LIGATION     • UPPER GASTROINTESTINAL ENDOSCOPY   09/28/2021       Visit Dx:     ICD-10-CM ICD-9-CM   1. Plantar fasciitis, bilateral  M72.2 728.71          Patient History     Row Name 02/02/22 1150             History    Brief Description of Current Complaint Presents today with reports of bilateral foot pain with L>R. Tried OTC insoles without much improvement. Reports having injections in feet and short term relief.  -BB      Patient/Caregiver Goals Relieve pain  -BB      Patient's Rating of General Health Very good  -BB      Occupation/sports/leisure activities Not at the time  -BB              Pain     Pain Location Foot  -BB      Pain at Present 7; 8  -BB      Pain at Best 7; 8  -BB      Pain Description Dull; Aching; Sharp  -BB              Fall Risk Assessment    Any falls in the past year: Yes  -BB            User Key  (r) = Recorded By, (t) = Taken By, (c) = Cosigned By    Initials Name Provider Type    Mayuri Jennings, PT DPT Physical Therapist                 PT Ortho     Row Name 02/02/22 1150       Subjective Comments    Subjective Comments see pt hx  -BB       Precautions and Contraindications    Precautions/Limitations no known precautions/limitations  -BB       Subjective Pain    Able to rate subjective pain? yes  -BB    Pre-Treatment Pain Level 8  -BB    Post-Treatment Pain Level 8  -BB       Posture/Observations    Posture/Observations Comments Bilateral pes planus with overpronation. mild callus of met heads. Wide foot. Mild callusing of 5th met head  -BB          User Key  (r) = Recorded By, (t) = Taken By, (c) = Cosigned By    Initials Name Provider Type    Mayuri Jennings, PT DPT Physical Therapist                            Therapy Education  Education Details: bilateral heel lifts with vasylis given to assist symptoms while awaiting custom insoles and rolling pins, wear schedule and skin inspection for insoles      PT OP Goals     Row Name 02/02/22 1150          PT Short Term Goals    STG Date to Achieve 02/23/22  -BB     STG 1  Independent in orhtotic wear schedule and skin inspection  -BB            Time Calculation    PT Goal Re-Cert Due Date 02/23/22  -BB           User Key  (r) = Recorded By, (t) = Taken By, (c) = Cosigned By    Initials Name Provider Type    Mayuri Jennings PT DPT Physical Therapist                 PT Assessment/Plan     Row Name 02/02/22 1150          PT Assessment    Functional Limitations Impaired gait; Limitations in functional capacity and performance  -BB     Impairments Gait; Poor body mechanics; Pain  -BB     Assessment Comments Patient presents today with bilateral tenderness of PF insertion with decreased L>R heel strike in gait. Patient given vasyli shock absorbers with heel lifts to aide in symptoms with education to use if assist or not to stop use while awaiting new custom orthotics.  -BB     Rehab Potential Good  -BB     Patient/caregiver participated in establishment of treatment plan and goals Yes  -BB     Patient would benefit from skilled therapy intervention Yes  -BB            PT Plan    Predicted Duration of Therapy Intervention (PT) PRN  -BB     Planned CPT's? PT EVAL LOW COMPLEXITY: 06737; PT INITIAL ORTHOTIC MGMT/TRAIN EA 15 MIN: 14610; PT THER SUPP EA 15 MIN  -BB     PT Plan Comments orthotic checkout and adjustment PRN  -BB           User Key  (r) = Recorded By, (t) = Taken By, (c) = Cosigned By    Initials Name Provider Type    Mayuri Jennings PT DPT Physical Therapist                   OP Exercises     Row Name 02/02/22 1150             Subjective Comments    Subjective Comments see pt hx  -BB              Subjective Pain    Able to rate subjective pain? yes  -BB      Pre-Treatment Pain Level 8  -BB      Post-Treatment Pain Level 8  -BB              Exercise 1    Exercise Name 1 prone cast mold  -BB            User Key  (r) = Recorded By, (t) = Taken By, (c) = Cosigned By    Initials Name Provider Type    Mayuri Jennings, TOMEKA DPT Physical Therapist                                         Time Calculation:     Start Time: 1150  Stop Time: 1219  Time Calculation (min): 29 min     Therapy Charges for Today     Code Description Service Date Service Provider Modifiers Qty    [id number] HC PT EVAL LOW COMPLEXITY 2 2/2/2022 Mayuri Grier PT DPT GP 1    [id number]  PT-CUSTOM ORTHOTICS-LEVEL 2 2/2/2022 Mayuri Grier, PT DPT  1                    Mayuri Grier, PT DPT  2/2/2022

## 2022-04-06 ENCOUNTER — APPOINTMENT (OUTPATIENT)
Dept: PHYSICAL THERAPY | Facility: HOSPITAL | Age: 47
End: 2022-04-06

## 2022-04-27 ENCOUNTER — APPOINTMENT (OUTPATIENT)
Dept: ONCOLOGY | Facility: HOSPITAL | Age: 47
End: 2022-04-27

## 2022-05-26 ENCOUNTER — OFFICE VISIT (OUTPATIENT)
Dept: GASTROENTEROLOGY | Facility: CLINIC | Age: 47
End: 2022-05-26

## 2022-05-26 VITALS
WEIGHT: 293 LBS | HEART RATE: 77 BPM | BODY MASS INDEX: 47.09 KG/M2 | HEIGHT: 66 IN | DIASTOLIC BLOOD PRESSURE: 101 MMHG | SYSTOLIC BLOOD PRESSURE: 165 MMHG

## 2022-05-26 DIAGNOSIS — K21.00 GASTROESOPHAGEAL REFLUX DISEASE WITH ESOPHAGITIS WITHOUT HEMORRHAGE: ICD-10-CM

## 2022-05-26 DIAGNOSIS — K59.04 CHRONIC IDIOPATHIC CONSTIPATION: Primary | ICD-10-CM

## 2022-05-26 PROCEDURE — 99213 OFFICE O/P EST LOW 20 MIN: CPT | Performed by: NURSE PRACTITIONER

## 2022-05-26 RX ORDER — HYDROCHLOROTHIAZIDE 12.5 MG/1
TABLET ORAL
COMMUNITY
Start: 2022-03-21

## 2022-05-26 RX ORDER — DOCUSATE SODIUM 100 MG/1
100 CAPSULE, LIQUID FILLED ORAL 2 TIMES DAILY
Qty: 60 CAPSULE | Refills: 5 | Status: SHIPPED | OUTPATIENT
Start: 2022-05-26 | End: 2022-12-08

## 2022-05-26 RX ORDER — DEXLANSOPRAZOLE 60 MG/1
60 CAPSULE, DELAYED RELEASE ORAL DAILY
Qty: 30 CAPSULE | Refills: 5 | Status: SHIPPED | OUTPATIENT
Start: 2022-05-26 | End: 2022-11-28 | Stop reason: SDUPTHER

## 2022-05-26 RX ORDER — DEXLANSOPRAZOLE 60 MG/1
60 CAPSULE, DELAYED RELEASE ORAL DAILY
COMMUNITY
End: 2022-05-26 | Stop reason: SDUPTHER

## 2022-05-26 RX ORDER — GABAPENTIN 300 MG/1
300 CAPSULE ORAL 4 TIMES DAILY
COMMUNITY
Start: 2022-02-10

## 2022-05-26 RX ORDER — SUCRALFATE 1 G/1
1 TABLET ORAL 4 TIMES DAILY
Qty: 60 TABLET | Refills: 3 | Status: SHIPPED | OUTPATIENT
Start: 2022-05-26

## 2022-05-26 NOTE — PROGRESS NOTES
Chief Complaint   Patient presents with   • Heartburn       Subjective    Michelle Lange is a 47 y.o. female. she is here today for follow-up.    History of Present Illness  47-year-old female presents for recheck regarding GERD and new problem of constipation.  States  Has well controlled her GERD only takes Carafate as needed but still occasionally takes and would like refill.  She reports intermittent nausea states lower abdominal pain has been much better since hysterectomy.  Has had issues with constipation tried to increase her dietary fiber and increase physical activity with no relief in symptoms.       The following portions of the patient's history were reviewed and updated as appropriate:   Past Medical History:   Diagnosis Date   • Anemia    • Anxiety and depression    • Asthma    • Excessive or frequent menstruation    • GERD (gastroesophageal reflux disease)    • History of mammogram 2016   • Hypertension    • Migraines    • Otitis media    • Otorrhea    • Plantar fasciitis      Past Surgical History:   Procedure Laterality Date   •  SECTION     • CHOLECYSTECTOMY     • COLONOSCOPY N/A 2021    Procedure: COLONOSCOPY;  Surgeon: Michelle Lemus MD;  Location: [facility] ENDOSCOPY;  Service: Gastroenterology;  Laterality: N/A;   • ENDOSCOPY N/A 2021    Procedure: ESOPHAGOGASTRODUODENOSCOPY;  Surgeon: Michelle Lemus MD;  Location: [facility] ENDOSCOPY;  Service: Gastroenterology;  Laterality: N/A;   • GASTRIC SLEEVE LAPAROSCOPIC     • INJECTION OF MEDICATION  2015    Kenalog (1)     • KNEE SURGERY Right    • LAPAROSCOPIC ASSISTED VAGINAL HYSTERECTOMY N/A 2021    Procedure: LAPAROSCOPIC ASSISTED VAGINAL HYSTERECTOMY with salpingectomy; cystoscopy;  Surgeon: Johnathan Younger MD;  Location: [facility] OR;  Service: Obstetrics/Gynecology;  Laterality: N/A;   • OTHER SURGICAL HISTORY  2014    Hysteroscope procedure (1)    Exam under anesthesia, diagnostic hysteroscopy  with fractional dilation and curettage and NovaSure endometrial ablation.    • TUBAL ABDOMINAL LIGATION     • UPPER GASTROINTESTINAL ENDOSCOPY  09/28/2021     Family History   Problem Relation Age of Onset   • Diabetes Other    • Heart disease Other    • Hypertension Other    • Stroke Other    • Seizures Other    • Heart disease Mother    • Hypertension Mother    • Diabetes Father    • Heart disease Father    • Hypertension Father    • Osteoporosis Father    • Diabetes Maternal Grandmother    • Hypertension Maternal Grandmother    • Diabetes Paternal Grandmother    • Heart disease Paternal Grandmother    • Hypertension Paternal Grandmother    • Osteoporosis Paternal Grandmother      OB History    No obstetric history on file.       Prior to Admission medications    Medication Sig Start Date End Date Taking? Authorizing Provider   albuterol (PROVENTIL HFA;VENTOLIN HFA) 108 (90 Base) MCG/ACT inhaler Inhale 2 puffs Every 4 (Four) Hours As Needed for Wheezing.   Yes Emergency, Nurse CHRISTIE Ge   amLODIPine (NORVASC) 10 MG tablet Take 10 mg by mouth Daily. 4/13/21  Yes Weston Rose MD   buPROPion XL (WELLBUTRIN XL) 150 MG 24 hr tablet Take 150 mg by mouth Every Night. 5/12/21  Yes ProviderWeston MD   butalbital-acetaminophen-caffeine (FIORICET, ESGIC) -40 MG per tablet Take 1 tablet by mouth Every 4 (Four) Hours As Needed. 7/10/20  Yes Emergency, Nurse CHRISTIE Ge   cyanocobalamin (VITAMIN B-12) 1000 MCG tablet Take 1 tablet by mouth Daily. 1/19/22  Yes Bg Segura MD   dexlansoprazole (Dexilant) 60 MG capsule Take 60 mg by mouth Daily.   Yes ProviderWeston MD   ergocalciferol (ERGOCALCIFEROL) 1.25 MG (81488 UT) capsule Take 50,000 Units by mouth. 7/9/21 7/10/22 Yes Weston Rose MD   folic acid (FOLVITE) 1 MG tablet Take 1 tablet by mouth Daily. 1/19/22  Yes Bg Segura MD   gabapentin (NEURONTIN) 300 MG capsule Take 300 mg by mouth 4 (Four) Times a Day. 2/10/22  Yes Rose  MD Weston   hydroCHLOROthiazide (HYDRODIURIL) 12.5 MG tablet TAKE 1 TABLET(12.5 MG) BY MOUTH EVERY MORNING 3/21/22  Yes ProviderWeston MD   ondansetron (ZOFRAN) 4 MG tablet Take 1 tablet by mouth Every 6 (Six) Hours As Needed for Nausea or Vomiting. 12/1/21  Yes Johnathan Younger MD   Pain Relief Extra Strength 500 MG tablet TAKE TWO TABLETS BY MOUTH EVERY 8 HOURS 12/20/21  Yes ProviderWeston MD   sucralfate (Carafate) 1 g tablet Take 1 tablet by mouth 4 (Four) Times a Day. 12/13/21  Yes Paulina Guzmán APRN   tiZANidine (ZANAFLEX) 4 MG tablet Take 4 mg by mouth Every 8 (Eight) Hours As Needed. 10/25/21  Yes ProviderWeston MD   gabapentin (NEURONTIN) 600 MG tablet Take 300 mg by mouth 4 (Four) Times a Day. 7/10/20 5/26/22  Emergency, Nurse Luma, RN   methylPREDNISolone (MEDROL) 4 MG dose pack Take as directed 12/28/21 5/26/22  Ishmael Castañeda DPM   oxyCODONE (ROXICODONE) 10 MG tablet Take 1 tablet by mouth Every 4 (Four) Hours As Needed for Moderate Pain . 12/6/21 5/26/22  Johnathan Younger MD     Allergies   Allergen Reactions   • Ibuprofen Anaphylaxis   • Phenergan [Promethazine] Other (See Comments)     Pt states arms and legs jump uncontrollably      Social History     Socioeconomic History   • Marital status:    Tobacco Use   • Smoking status: Never Smoker   • Smokeless tobacco: Never Used   Vaping Use   • Vaping Use: Never used   Substance and Sexual Activity   • Alcohol use: No   • Drug use: Never   • Sexual activity: Yes     Partners: Male     Birth control/protection: Surgical       Review of Systems  Review of Systems   Constitutional: Positive for fatigue. Negative for activity change, appetite change, chills, diaphoresis, fever and unexpected weight change.   HENT: Negative for sore throat and trouble swallowing.    Respiratory: Negative for shortness of breath.    Gastrointestinal: Positive for abdominal pain and constipation (2-3 times per week). Negative for abdominal  "distention, anal bleeding, blood in stool, diarrhea, nausea, rectal pain and vomiting.   Musculoskeletal: Negative for arthralgias.   Skin: Negative for pallor.   Neurological: Negative for light-headedness.        BP (!) 165/101 (BP Location: Left arm) Comment: pt has headache  Pulse 77   Ht 167.6 cm (66\")   Wt (!) 144 kg (317 lb 3.2 oz)   BMI 51.20 kg/m²     Objective    Physical Exam  Constitutional:       General: She is not in acute distress.     Appearance: Normal appearance. She is not ill-appearing.   HENT:      Head: Normocephalic and atraumatic.   Pulmonary:      Effort: Pulmonary effort is normal.   Abdominal:      General: Abdomen is flat. Bowel sounds are normal. There is no distension.      Palpations: Abdomen is soft. There is no mass.      Tenderness: There is no abdominal tenderness.   Neurological:      Mental Status: She is alert.       Lab on 01/19/2022   Component Date Value Ref Range Status   • Iron 01/19/2022 74  37 - 145 mcg/dL Final   • Iron Saturation 01/19/2022 22  20 - 50 % Final   • Transferrin 01/19/2022 221  200 - 360 mg/dL Final   • TIBC 01/19/2022 329  298 - 536 mcg/dL Final   • Ferritin 01/19/2022 478.90 (A) 13.00 - 150.00 ng/mL Final   • Folate 01/19/2022 7.22  4.78 - 24.20 ng/mL Final   • Vitamin B-12 01/19/2022 326  211 - 946 pg/mL Final   • WBC 01/19/2022 8.11  3.40 - 10.80 10*3/mm3 Final   • RBC 01/19/2022 4.58  3.77 - 5.28 10*6/mm3 Final   • Hemoglobin 01/19/2022 12.6  12.0 - 15.9 g/dL Final   • Hematocrit 01/19/2022 39.1  34.0 - 46.6 % Final   • MCV 01/19/2022 85.4  79.0 - 97.0 fL Final   • MCH 01/19/2022 27.5  26.6 - 33.0 pg Final   • MCHC 01/19/2022 32.2  31.5 - 35.7 g/dL Final   • RDW 01/19/2022 15.7 (A) 12.3 - 15.4 % Final   • RDW-SD 01/19/2022 48.3  37.0 - 54.0 fl Final   • MPV 01/19/2022 9.8  6.0 - 12.0 fL Final   • Platelets 01/19/2022 382  140 - 450 10*3/mm3 Final   • Neutrophil % 01/19/2022 50.6  42.7 - 76.0 % Final   • Lymphocyte % 01/19/2022 36.7  19.6 - 45.3 % " Final   • Monocyte % 01/19/2022 7.9  5.0 - 12.0 % Final   • Eosinophil % 01/19/2022 3.7  0.3 - 6.2 % Final   • Basophil % 01/19/2022 0.7  0.0 - 1.5 % Final   • Immature Grans % 01/19/2022 0.4  0.0 - 0.5 % Final   • Neutrophils, Absolute 01/19/2022 4.10  1.70 - 7.00 10*3/mm3 Final   • Lymphocytes, Absolute 01/19/2022 2.98  0.70 - 3.10 10*3/mm3 Final   • Monocytes, Absolute 01/19/2022 0.64  0.10 - 0.90 10*3/mm3 Final   • Eosinophils, Absolute 01/19/2022 0.30  0.00 - 0.40 10*3/mm3 Final   • Basophils, Absolute 01/19/2022 0.06  0.00 - 0.20 10*3/mm3 Final   • Immature Grans, Absolute 01/19/2022 0.03  0.00 - 0.05 10*3/mm3 Final   • nRBC 01/19/2022 0.0  0.0 - 0.2 /100 WBC Final     Assessment & Plan      1. Chronic idiopathic constipation    2. Gastroesophageal reflux disease with esophagitis without hemorrhage    .   We will start patient on Colace twice daily increase dietary fiber and add fiber supplement daily MiraLAX as needed increase physical activity and discussed importance of adequate fluid consumption.  Continue Dexilant daily decrease Carafate to twice per day as needed.  Has lab work planned next week per PCP renal function was stable at last check.    Orders placed during this encounter include:  No orders of the defined types were placed in this encounter.      * Surgery not found *    Review and/or summary of lab tests, radiology, procedures, medications. Review and summary of old records and obtaining of history. The risks and benefits of my recommendations, as well as other treatment options were discussed with the patient today. Questions were answered.    New Medications Ordered This Visit   Medications   • dexlansoprazole (Dexilant) 60 MG capsule     Sig: Take 1 capsule by mouth Daily.     Dispense:  30 capsule     Refill:  5   • sucralfate (Carafate) 1 g tablet     Sig: Take 1 tablet by mouth 4 (Four) Times a Day.     Dispense:  60 tablet     Refill:  3   • docusate sodium (Colace) 100 MG capsule      Sig: Take 1 capsule by mouth 2 (Two) Times a Day.     Dispense:  60 capsule     Refill:  5       Follow-up: Return in about 6 months (around 11/26/2022).          This document has been electronically signed by PORTIA Montelongo on May 27, 2022 08:44 CDT           I spent 16 minutes caring for Michelle on this date of service. This time includes time spent by me in the following activities:preparing for the visit, reviewing tests, obtaining and/or reviewing a separately obtained history, performing a medically appropriate examination and/or evaluation , counseling and educating the patient/family/caregiver, ordering medications, tests, or procedures, referring and communicating with other health care professionals , documenting information in the medical record and care coordination    Results for orders placed or performed in visit on 01/19/22   CBC Auto Differential    Specimen: Blood   Result Value Ref Range    WBC 8.11 3.40 - 10.80 10*3/mm3    RBC 4.58 3.77 - 5.28 10*6/mm3    Hemoglobin 12.6 12.0 - 15.9 g/dL    Hematocrit 39.1 34.0 - 46.6 %    MCV 85.4 79.0 - 97.0 fL    MCH 27.5 26.6 - 33.0 pg    MCHC 32.2 31.5 - 35.7 g/dL    RDW 15.7 (H) 12.3 - 15.4 %    RDW-SD 48.3 37.0 - 54.0 fl    MPV 9.8 6.0 - 12.0 fL    Platelets 382 140 - 450 10*3/mm3    Neutrophil % 50.6 42.7 - 76.0 %    Lymphocyte % 36.7 19.6 - 45.3 %    Monocyte % 7.9 5.0 - 12.0 %    Eosinophil % 3.7 0.3 - 6.2 %    Basophil % 0.7 0.0 - 1.5 %    Immature Grans % 0.4 0.0 - 0.5 %    Neutrophils, Absolute 4.10 1.70 - 7.00 10*3/mm3    Lymphocytes, Absolute 2.98 0.70 - 3.10 10*3/mm3    Monocytes, Absolute 0.64 0.10 - 0.90 10*3/mm3    Eosinophils, Absolute 0.30 0.00 - 0.40 10*3/mm3    Basophils, Absolute 0.06 0.00 - 0.20 10*3/mm3    Immature Grans, Absolute 0.03 0.00 - 0.05 10*3/mm3    nRBC 0.0 0.0 - 0.2 /100 WBC   Iron and TIBC    Specimen: Blood   Result Value Ref Range    Iron 74 37 - 145 mcg/dL    Iron Saturation 22 20 - 50 %    Transferrin 221 200 - 360  mg/dL    TIBC 329 298 - 536 mcg/dL   Folate    Specimen: Blood   Result Value Ref Range    Folate 7.22 4.78 - 24.20 ng/mL   Ferritin    Specimen: Blood   Result Value Ref Range    Ferritin 478.90 (H) 13.00 - 150.00 ng/mL   Vitamin B12    Specimen: Blood   Result Value Ref Range    Vitamin B-12 326 211 - 946 pg/mL   Results for orders placed or performed in visit on 12/21/21   Comprehensive Metabolic Panel    Specimen: Blood   Result Value Ref Range    Glucose 130 (H) 65 - 99 mg/dL    BUN 18 6 - 20 mg/dL    Creatinine 1.24 (H) 0.57 - 1.00 mg/dL    Sodium 143 136 - 145 mmol/L    Potassium 4.1 3.5 - 5.2 mmol/L    Chloride 108 (H) 98 - 107 mmol/L    CO2 27.0 22.0 - 29.0 mmol/L    Calcium 9.4 8.6 - 10.5 mg/dL    Total Protein 7.5 6.0 - 8.5 g/dL    Albumin 4.00 3.50 - 5.20 g/dL    ALT (SGPT) 11 1 - 33 U/L    AST (SGOT) 11 1 - 32 U/L    Alkaline Phosphatase 98 39 - 117 U/L    Total Bilirubin 0.2 0.0 - 1.2 mg/dL    eGFR Non African Amer 47 (L) >60 mL/min/1.73    Globulin 3.5 gm/dL    A/G Ratio 1.1 g/dL    BUN/Creatinine Ratio 14.5 7.0 - 25.0    Anion Gap 8.0 5.0 - 15.0 mmol/L   Results for orders placed or performed in visit on 12/21/21   CBC Auto Differential    Specimen: Blood   Result Value Ref Range    WBC 7.61 3.40 - 10.80 10*3/mm3    RBC 4.32 3.77 - 5.28 10*6/mm3    Hemoglobin 11.7 (L) 12.0 - 15.9 g/dL    Hematocrit 36.6 34.0 - 46.6 %    MCV 84.7 79.0 - 97.0 fL    MCH 27.1 26.6 - 33.0 pg    MCHC 32.0 31.5 - 35.7 g/dL    RDW 16.5 (H) 12.3 - 15.4 %    RDW-SD 51.5 37.0 - 54.0 fl    MPV 10.1 6.0 - 12.0 fL    Platelets 475 (H) 140 - 450 10*3/mm3    Neutrophil % 46.7 42.7 - 76.0 %    Lymphocyte % 36.5 19.6 - 45.3 %    Monocyte % 10.1 5.0 - 12.0 %    Eosinophil % 5.4 0.3 - 6.2 %    Basophil % 0.9 0.0 - 1.5 %    Immature Grans % 0.4 0.0 - 0.5 %    Neutrophils, Absolute 3.55 1.70 - 7.00 10*3/mm3    Lymphocytes, Absolute 2.78 0.70 - 3.10 10*3/mm3    Monocytes, Absolute 0.77 0.10 - 0.90 10*3/mm3    Eosinophils, Absolute 0.41 (H)  0.00 - 0.40 10*3/mm3    Basophils, Absolute 0.07 0.00 - 0.20 10*3/mm3    Immature Grans, Absolute 0.03 0.00 - 0.05 10*3/mm3    nRBC 0.0 0.0 - 0.2 /100 WBC   Results for orders placed or performed in visit on 12/06/21   C-reactive Protein    Specimen: Blood   Result Value Ref Range    C-Reactive Protein 2.86 (H) 0.00 - 0.50 mg/dL   Results for orders placed or performed in visit on 12/06/21   CBC Auto Differential    Specimen: Blood   Result Value Ref Range    WBC 10.60 3.40 - 10.80 10*3/mm3    RBC 4.52 3.77 - 5.28 10*6/mm3    Hemoglobin 12.3 12.0 - 15.9 g/dL    Hematocrit 38.6 34.0 - 46.6 %    MCV 85.4 79.0 - 97.0 fL    MCH 27.2 26.6 - 33.0 pg    MCHC 31.9 31.5 - 35.7 g/dL    RDW 16.6 (H) 12.3 - 15.4 %    RDW-SD 51.6 37.0 - 54.0 fl    MPV 9.5 6.0 - 12.0 fL    Platelets 459 (H) 140 - 450 10*3/mm3    Neutrophil % 56.9 42.7 - 76.0 %    Lymphocyte % 27.2 19.6 - 45.3 %    Monocyte % 8.5 5.0 - 12.0 %    Eosinophil % 6.1 0.3 - 6.2 %    Basophil % 0.6 0.0 - 1.5 %    Immature Grans % 0.7 (H) 0.0 - 0.5 %    Neutrophils, Absolute 6.04 1.70 - 7.00 10*3/mm3    Lymphocytes, Absolute 2.88 0.70 - 3.10 10*3/mm3    Monocytes, Absolute 0.90 0.10 - 0.90 10*3/mm3    Eosinophils, Absolute 0.65 (H) 0.00 - 0.40 10*3/mm3    Basophils, Absolute 0.06 0.00 - 0.20 10*3/mm3    Immature Grans, Absolute 0.07 (H) 0.00 - 0.05 10*3/mm3    nRBC 0.0 0.0 - 0.2 /100 WBC   Results for orders placed or performed during the hospital encounter of 11/30/21   PREVIOUS HISTORY    Specimen: Blood   Result Value Ref Range    Previous History Previous Record on File      *Note: Due to a large number of results and/or encounters for the requested time period, some results have not been displayed. A complete set of results can be found in Results Review.

## 2022-08-19 ENCOUNTER — LAB (OUTPATIENT)
Dept: ONCOLOGY | Facility: HOSPITAL | Age: 47
End: 2022-08-19

## 2022-08-19 ENCOUNTER — OFFICE VISIT (OUTPATIENT)
Dept: ONCOLOGY | Facility: CLINIC | Age: 47
End: 2022-08-19

## 2022-08-19 VITALS
OXYGEN SATURATION: 94 % | HEART RATE: 63 BPM | BODY MASS INDEX: 51.41 KG/M2 | SYSTOLIC BLOOD PRESSURE: 158 MMHG | WEIGHT: 293 LBS | DIASTOLIC BLOOD PRESSURE: 104 MMHG | TEMPERATURE: 97.3 F

## 2022-08-19 DIAGNOSIS — D50.0 IRON DEFICIENCY ANEMIA DUE TO CHRONIC BLOOD LOSS: Primary | Chronic | ICD-10-CM

## 2022-08-19 DIAGNOSIS — T45.4X5A ADVERSE EFFECT OF IRON, INITIAL ENCOUNTER: ICD-10-CM

## 2022-08-19 DIAGNOSIS — D75.839 THROMBOCYTOSIS: Chronic | ICD-10-CM

## 2022-08-19 DIAGNOSIS — E53.8 FOLATE DEFICIENCY: ICD-10-CM

## 2022-08-19 DIAGNOSIS — D75.839 THROMBOCYTOSIS: ICD-10-CM

## 2022-08-19 DIAGNOSIS — T45.4X5A ADVERSE EFFECT OF IRON, INITIAL ENCOUNTER: Chronic | ICD-10-CM

## 2022-08-19 DIAGNOSIS — E53.8 FOLATE DEFICIENCY: Chronic | ICD-10-CM

## 2022-08-19 DIAGNOSIS — D50.0 IRON DEFICIENCY ANEMIA DUE TO CHRONIC BLOOD LOSS: ICD-10-CM

## 2022-08-19 LAB
BASOPHILS # BLD AUTO: 0.06 10*3/MM3 (ref 0–0.2)
BASOPHILS NFR BLD AUTO: 0.7 % (ref 0–1.5)
DEPRECATED RDW RBC AUTO: 44.1 FL (ref 37–54)
EOSINOPHIL # BLD AUTO: 0.23 10*3/MM3 (ref 0–0.4)
EOSINOPHIL NFR BLD AUTO: 2.8 % (ref 0.3–6.2)
ERYTHROCYTE [DISTWIDTH] IN BLOOD BY AUTOMATED COUNT: 14.5 % (ref 12.3–15.4)
FERRITIN SERPL-MCNC: 352.7 NG/ML (ref 13–150)
FOLATE SERPL-MCNC: 7.45 NG/ML (ref 4.78–24.2)
HCT VFR BLD AUTO: 38.5 % (ref 34–46.6)
HGB BLD-MCNC: 12.6 G/DL (ref 12–15.9)
IMM GRANULOCYTES # BLD AUTO: 0.03 10*3/MM3 (ref 0–0.05)
IMM GRANULOCYTES NFR BLD AUTO: 0.4 % (ref 0–0.5)
IRON 24H UR-MRATE: 80 MCG/DL (ref 37–145)
IRON SATN MFR SERPL: 25 % (ref 20–50)
LYMPHOCYTES # BLD AUTO: 3.17 10*3/MM3 (ref 0.7–3.1)
LYMPHOCYTES NFR BLD AUTO: 39 % (ref 19.6–45.3)
MCH RBC QN AUTO: 27.5 PG (ref 26.6–33)
MCHC RBC AUTO-ENTMCNC: 32.7 G/DL (ref 31.5–35.7)
MCV RBC AUTO: 84.1 FL (ref 79–97)
MONOCYTES # BLD AUTO: 0.67 10*3/MM3 (ref 0.1–0.9)
MONOCYTES NFR BLD AUTO: 8.2 % (ref 5–12)
NEUTROPHILS NFR BLD AUTO: 3.97 10*3/MM3 (ref 1.7–7)
NEUTROPHILS NFR BLD AUTO: 48.9 % (ref 42.7–76)
NRBC BLD AUTO-RTO: 0 /100 WBC (ref 0–0.2)
PLATELET # BLD AUTO: 370 10*3/MM3 (ref 140–450)
PMV BLD AUTO: 9.9 FL (ref 6–12)
RBC # BLD AUTO: 4.58 10*6/MM3 (ref 3.77–5.28)
TIBC SERPL-MCNC: 317 MCG/DL (ref 298–536)
TRANSFERRIN SERPL-MCNC: 213 MG/DL (ref 200–360)
VIT B12 BLD-MCNC: 359 PG/ML (ref 211–946)
WBC NRBC COR # BLD: 8.13 10*3/MM3 (ref 3.4–10.8)

## 2022-08-19 PROCEDURE — 82746 ASSAY OF FOLIC ACID SERUM: CPT

## 2022-08-19 PROCEDURE — 1126F AMNT PAIN NOTED NONE PRSNT: CPT | Performed by: INTERNAL MEDICINE

## 2022-08-19 PROCEDURE — 85025 COMPLETE CBC W/AUTO DIFF WBC: CPT

## 2022-08-19 PROCEDURE — 82607 VITAMIN B-12: CPT

## 2022-08-19 PROCEDURE — 84466 ASSAY OF TRANSFERRIN: CPT

## 2022-08-19 PROCEDURE — 82728 ASSAY OF FERRITIN: CPT

## 2022-08-19 PROCEDURE — 99214 OFFICE O/P EST MOD 30 MIN: CPT | Performed by: INTERNAL MEDICINE

## 2022-08-19 PROCEDURE — G0463 HOSPITAL OUTPT CLINIC VISIT: HCPCS | Performed by: INTERNAL MEDICINE

## 2022-08-19 PROCEDURE — 83540 ASSAY OF IRON: CPT

## 2022-08-19 RX ORDER — ERGOCALCIFEROL 1.25 MG/1
50000 CAPSULE ORAL
COMMUNITY
Start: 2022-06-10 | End: 2023-06-11

## 2022-08-19 RX ORDER — IRBESARTAN 150 MG/1
150 TABLET ORAL DAILY
COMMUNITY
Start: 2022-08-16

## 2022-08-19 RX ORDER — AMLODIPINE BESYLATE 5 MG/1
5 TABLET ORAL
COMMUNITY
Start: 2022-05-27 | End: 2022-12-08

## 2022-08-19 NOTE — PROGRESS NOTES
DATE OF VISIT: 8/20/2022      REASON FOR VISIT: Anemia, thrombocytosis      HISTORY OF PRESENT ILLNESS:   47-year-old female with medical problem consisting of hypertension, plantar fasciitis, endometrial ablation in 2014, gastric sleeve surgery done in 2017 who was initially seen in consultation on August 2, 2021 for anemia and thrombocytosis.  Due to iron malabsorption patient has received intravenous Injectafer in the past.  Patient also remains on folic acid supplementation due to folate deficiency.  Patient complains of fatigue.  Complains of chronic arthralgia.  Denies any bleeding.  Denies any new lymph node enlargement.      Past Medical History, Past Surgical History, Social History, Family History have been reviewed and are without significant changes except as mentioned.    Review of Systems   A comprehensive 14 point review of systems was performed and was negative except as mentioned in HPI.    Medications:  The current medication list was reviewed in the EMR    ALLERGIES:    Allergies   Allergen Reactions   • Ibuprofen Anaphylaxis   • Phenergan [Promethazine] Other (See Comments)     Pt states arms and legs jump uncontrollably        Objective      Vitals:    08/19/22 1113   BP: (!) 158/104   Pulse: 63   Temp: 97.3 °F (36.3 °C)   TempSrc: Temporal   SpO2: 94%   Weight: (!) 144 kg (318 lb 8 oz)   PainSc: 0-No pain     No flowsheet data found.    Physical Exam  Pulmonary:      Breath sounds: Normal breath sounds.   Neurological:      Mental Status: She is alert and oriented to person, place, and time.           RECENT LABS:  Glucose   Date Value Ref Range Status   12/21/2021 130 (H) 65 - 99 mg/dL Final     Sodium   Date Value Ref Range Status   06/10/2022 140 136 - 145 mmol/L Final     Potassium   Date Value Ref Range Status   06/10/2022 4.2 3.5 - 5.1 mmol/L Final     Total CO2   Date Value Ref Range Status   06/10/2022 28 21 - 31 mmol/L Final     Chloride   Date Value Ref Range Status   06/10/2022 105  98 - 107 mmol/L Final     Anion Gap   Date Value Ref Range Status   12/21/2021 8.0 5.0 - 15.0 mmol/L Final     Creatinine   Date Value Ref Range Status   06/10/2022 1.2 0.7 - 1.3 mg/dL Final     BUN   Date Value Ref Range Status   06/10/2022 18 7 - 25 mg/dL Final     BUN/Creatinine Ratio   Date Value Ref Range Status   12/21/2021 14.5 7.0 - 25.0 Final     Calcium   Date Value Ref Range Status   06/10/2022 9.1 8.6 - 10.3 mg/dL Final     eGFR Non  Amer   Date Value Ref Range Status   12/21/2021 47 (L) >60 mL/min/1.73 Final     Alkaline Phosphatase   Date Value Ref Range Status   06/10/2022 93 34 - 104 U/L Final     Total Protein   Date Value Ref Range Status   12/21/2021 7.5 6.0 - 8.5 g/dL Final     ALT (SGPT)   Date Value Ref Range Status   06/10/2022 14 7 - 52 U/L Final     AST (SGOT)   Date Value Ref Range Status   06/10/2022 12 (L) 13 - 39 U/L Final     Total Bilirubin   Date Value Ref Range Status   06/10/2022 0.39 0.3 - 1.0 mg/dL Final     Albumin   Date Value Ref Range Status   06/10/2022 3.8 3.5 - 5.7 g/dL Final     Globulin   Date Value Ref Range Status   12/21/2021 3.5 gm/dL Final     Lab Results   Component Value Date    WBC 8.13 08/19/2022    HGB 12.6 08/19/2022    HCT 38.5 08/19/2022    MCV 84.1 08/19/2022     08/19/2022     Lab Results   Component Value Date    NEUTROABS 3.97 08/19/2022    IRON 80 08/19/2022    IRON 74 01/19/2022    IRON 37 10/04/2021    TIBC 317 08/19/2022    TIBC 329 01/19/2022    TIBC 353 10/04/2021    LABIRON 25 08/19/2022    LABIRON 22 01/19/2022    LABIRON 10 (L) 10/04/2021    FERRITIN 352.70 (H) 08/19/2022    FERRITIN 478.90 (H) 01/19/2022    FERRITIN 74.62 10/04/2021    [id number] 359 08/19/2022    [id number] 326 01/19/2022    [id number] 349 10/04/2021    FOLATE 7.45 08/19/2022    FOLATE 7.22 01/19/2022    FOLATE 5.98 10/04/2021     Lab Results   Component Value Date    HCGQUANT 5.41 (H) 06/01/2018         PATHOLOGY:  * Cannot find OR log *         RADIOLOGY DATA  :  No radiology results for the last 7 days        Assessment & Plan      1.Anemia:  - Due to iron malabsorption, patient received intravenous Injectafer in November 2021  - Anemia work-up done today shows hemoglobin is 12.6  -Due to gastric bypass status, recommend continuing with B12 and folic acid p.o. daily  - Patient will return to clinic in 3 months with repeat CBC, iron studies, ferritin, B12 and folate to be done on that day.    2.  Folate deficiency  - Patient is currently on folic acid p.o. daily  - Folate levels is still lower than normal at 7.45    3.  Thrombocytosis:  - Reactive due to iron deficiency  - Platelet count is 370,000    4.  History of gastric sleeve surgery    5.  Health maintenance: Patient does not smoke.  Had EGD and colonoscopy in September 2021                   PHQ-9 Total Score: 0   -Patient is not homicidal or suicidal.  No acute intervention required.    Michelle Lange reports a pain score of 0.  Given her pain assessment as noted, treatment options were discussed and the following options were decided upon as a follow-up plan to address the patient's pain: continuation of current treatment plan for pain.         Bg Segura MD  8/20/2022  12:37 CDT        Part of this note may be an electronic transcription/translation of spoken language to printed text using the Dragon Dictation System.          CC:

## 2022-08-22 ENCOUNTER — TELEPHONE (OUTPATIENT)
Dept: ONCOLOGY | Facility: HOSPITAL | Age: 47
End: 2022-08-22

## 2022-08-22 NOTE — TELEPHONE ENCOUNTER
----- Message from Bg Segura MD sent at 8/20/2022 12:37 PM CDT -----  Regarding: Labs  Please let patient know, her B12 and folate level has not shown any significant improvement since last clinic visit.  Recommend continue with B12 and folic acid p.o. daily.  Iron studies are adequate no need for any intravenous iron replacement at present.  Thank you

## 2022-08-22 NOTE — TELEPHONE ENCOUNTER
Called and spoke with pt regarding lab and medication instructions as per dr. Segura, v/u obtained.

## 2022-11-28 ENCOUNTER — OFFICE VISIT (OUTPATIENT)
Dept: GASTROENTEROLOGY | Facility: CLINIC | Age: 47
End: 2022-11-28

## 2022-11-28 VITALS
HEIGHT: 66 IN | WEIGHT: 293 LBS | BODY MASS INDEX: 47.09 KG/M2 | SYSTOLIC BLOOD PRESSURE: 167 MMHG | DIASTOLIC BLOOD PRESSURE: 86 MMHG | HEART RATE: 90 BPM

## 2022-11-28 DIAGNOSIS — Z12.11 ENCOUNTER FOR COLONOSCOPY DUE TO HISTORY OF COLONIC POLYP: ICD-10-CM

## 2022-11-28 DIAGNOSIS — Z86.010 ENCOUNTER FOR COLONOSCOPY DUE TO HISTORY OF COLONIC POLYP: ICD-10-CM

## 2022-11-28 DIAGNOSIS — K21.00 GASTROESOPHAGEAL REFLUX DISEASE WITH ESOPHAGITIS WITHOUT HEMORRHAGE: Primary | ICD-10-CM

## 2022-11-28 PROCEDURE — 99213 OFFICE O/P EST LOW 20 MIN: CPT | Performed by: NURSE PRACTITIONER

## 2022-11-28 RX ORDER — DEXTROSE AND SODIUM CHLORIDE 5; .45 G/100ML; G/100ML
30 INJECTION, SOLUTION INTRAVENOUS CONTINUOUS PRN
Status: CANCELLED | OUTPATIENT
Start: 2022-12-13

## 2022-11-28 RX ORDER — SODIUM CHLORIDE 9 MG/ML
40 INJECTION, SOLUTION INTRAVENOUS AS NEEDED
Status: CANCELLED | OUTPATIENT
Start: 2022-11-28

## 2022-11-28 RX ORDER — DEXLANSOPRAZOLE 60 MG/1
60 CAPSULE, DELAYED RELEASE ORAL DAILY
Qty: 30 CAPSULE | Refills: 5 | Status: SHIPPED | OUTPATIENT
Start: 2022-11-28

## 2022-11-28 RX ORDER — POLYETHYLENE GLYCOL 3350 17 G/17G
17 POWDER, FOR SOLUTION ORAL DAILY
Qty: 255 G | Refills: 1 | Status: SHIPPED | OUTPATIENT
Start: 2022-11-28

## 2022-11-28 RX ORDER — TOPIRAMATE 100 MG/1
100 TABLET, FILM COATED ORAL DAILY
COMMUNITY
Start: 2022-11-26

## 2022-11-28 NOTE — PROGRESS NOTES
"                                                                                                                  Chief Complaint   Patient presents with   • Heartburn       Subjective    Michelle Lange is a 47 y.o. female. she is here today for follow-up.                                                                  Assessment & Plan                                     1. Gastroesophageal reflux disease with esophagitis without hemorrhage    2. Encounter for colonoscopy due to history of colonic polyp      Plan; schedule patient for colonoscopy due to poor prep and history of cecal polyp.    Continue Dexilant daily avoid gastric irritants follow standard antireflux measures.    Follow-up: Return in about 4 weeks (around 12/26/2022).     HPI    47-year-old female presents for 6-month recheck.  Reports reflux is doing well with Dexilant denies any breakthrough symptoms.  States bowel movements have been regular with her new migraine medication has not had to take MiraLAX or Colace in some time.  She is due for screening colonoscopy due to poor prep on previous colonoscopy.  She denies any melena or hematochezia.  Colonoscopy September 2021 noted inflammatory cecal  polyp    Review of Systems  Review of Systems   Constitutional: Negative for activity change, appetite change, chills, diaphoresis, fatigue, fever and unexpected weight change.   HENT: Negative for sore throat and trouble swallowing.    Respiratory: Negative for shortness of breath.    Gastrointestinal: Positive for constipation (doing well right now. Migraine meds causes loose stool ). Negative for abdominal distention, abdominal pain, anal bleeding, blood in stool, diarrhea, nausea, rectal pain and vomiting.   Musculoskeletal: Negative for arthralgias.   Skin: Negative for pallor.   Neurological: Negative for light-headedness.       /86 (BP Location: Left arm)   Pulse 90   Ht 167.6 cm (66\")   Wt (!) 140 kg (308 lb 12.8 oz)   BMI 49.84 " kg/m²     Objective      Physical Exam  Constitutional:       General: She is not in acute distress.     Appearance: Normal appearance. She is normal weight. She is not ill-appearing.   HENT:      Head: Normocephalic and atraumatic.   Pulmonary:      Effort: Pulmonary effort is normal.   Abdominal:      General: Abdomen is flat. Bowel sounds are normal. There is no distension.      Palpations: Abdomen is soft. There is no mass.      Tenderness: There is no abdominal tenderness.   Neurological:      Mental Status: She is alert.               The following portions of the patient's history were reviewed and updated as appropriate:   Past Medical History:   Diagnosis Date   • Anemia    • Anxiety and depression    • Asthma    • Chronic kidney disease    • Excessive or frequent menstruation    • GERD (gastroesophageal reflux disease)    • History of mammogram 2016   • Hypertension    • Migraines    • Otitis media    • Otorrhea    • Plantar fasciitis      Past Surgical History:   Procedure Laterality Date   • BARIATRIC SURGERY     •  SECTION     • CHOLECYSTECTOMY     • COLONOSCOPY N/A 2021    Procedure: COLONOSCOPY;  Surgeon: Michelle Lemus MD;  Location: [facility] ENDOSCOPY;  Service: Gastroenterology;  Laterality: N/A;   • ENDOSCOPY N/A 2021    Procedure: ESOPHAGOGASTRODUODENOSCOPY;  Surgeon: Michelle Lemus MD;  Location: [facility] ENDOSCOPY;  Service: Gastroenterology;  Laterality: N/A;   • GASTRIC SLEEVE LAPAROSCOPIC     • INJECTION OF MEDICATION  2015    Kenalog (1)     • KNEE SURGERY Right    • LAPAROSCOPIC ASSISTED VAGINAL HYSTERECTOMY N/A 2021    Procedure: LAPAROSCOPIC ASSISTED VAGINAL HYSTERECTOMY with salpingectomy; cystoscopy;  Surgeon: Johnathan Younger MD;  Location: [facility] OR;  Service: Obstetrics/Gynecology;  Laterality: N/A;   • OTHER SURGICAL HISTORY  2014    Hysteroscope procedure (1)    Exam under anesthesia, diagnostic hysteroscopy with fractional dilation  and curettage and NovaSure endometrial ablation.    • TUBAL ABDOMINAL LIGATION     • UPPER GASTROINTESTINAL ENDOSCOPY  09/28/2021     Family History   Problem Relation Age of Onset   • Diabetes Other    • Heart disease Other    • Hypertension Other    • Stroke Other    • Seizures Other    • Heart disease Mother    • Hypertension Mother    • Diabetes Father    • Heart disease Father    • Hypertension Father    • Osteoporosis Father    • Diabetes Maternal Grandmother    • Hypertension Maternal Grandmother    • Diabetes Paternal Grandmother    • Heart disease Paternal Grandmother    • Hypertension Paternal Grandmother    • Osteoporosis Paternal Grandmother      OB History    No obstetric history on file.       Allergies   Allergen Reactions   • Ibuprofen Anaphylaxis   • Phenergan [Promethazine] Other (See Comments)     Pt states arms and legs jump uncontrollably      Social History     Socioeconomic History   • Marital status:    Tobacco Use   • Smoking status: Never   • Smokeless tobacco: Never   Vaping Use   • Vaping Use: Never used   Substance and Sexual Activity   • Alcohol use: No   • Drug use: Never   • Sexual activity: Yes     Partners: Male     Birth control/protection: Surgical     Current Medications:  Prior to Admission medications    Medication Sig Start Date End Date Taking? Authorizing Provider   albuterol (PROVENTIL HFA;VENTOLIN HFA) 108 (90 Base) MCG/ACT inhaler Inhale 2 puffs Every 4 (Four) Hours As Needed for Wheezing.   Yes Emergency, Nurse Luma, RN   amLODIPine (NORVASC) 10 MG tablet Take 10 mg by mouth Daily. 4/13/21  Yes ProviderWeston MD   amLODIPine (NORVASC) 5 MG tablet Take 5 mg by mouth. 5/27/22  Yes ProviderWeston MD   buPROPion XL (WELLBUTRIN XL) 150 MG 24 hr tablet Take 150 mg by mouth Every Night. 5/12/21  Yes ProviderWeston MD   butalbital-acetaminophen-caffeine (FIORICET, ESGIC) -40 MG per tablet Take 1 tablet by mouth Every 4 (Four) Hours As Needed.  7/10/20  Yes Emergency, Nurse Luma, RN   cyanocobalamin (VITAMIN B-12) 1000 MCG tablet Take 1 tablet by mouth Daily. 1/19/22  Yes Bg Segura MD   dexlansoprazole (Dexilant) 60 MG capsule Take 1 capsule by mouth Daily. 5/26/22  Yes Paulina Guzmán APRN   docusate sodium (Colace) 100 MG capsule Take 1 capsule by mouth 2 (Two) Times a Day. 5/26/22  Yes Paulina Guzmán APRN   ergocalciferol (ERGOCALCIFEROL) 1.25 MG (81254 UT) capsule Take 50,000 Units by mouth. 6/10/22 6/11/23 Yes Weston Rose MD   folic acid (FOLVITE) 1 MG tablet Take 1 tablet by mouth Daily. 1/19/22  Yes Bg Segura MD   gabapentin (NEURONTIN) 300 MG capsule Take 300 mg by mouth 4 (Four) Times a Day. 2/10/22  Yes Weston Rose MD   hydroCHLOROthiazide (HYDRODIURIL) 12.5 MG tablet TAKE 1 TABLET(12.5 MG) BY MOUTH EVERY MORNING 3/21/22  Yes Weston Rose MD   irbesartan (AVAPRO) 150 MG tablet  8/16/22  Yes ProviderWeston MD   Pain Relief Extra Strength 500 MG tablet TAKE TWO TABLETS BY MOUTH EVERY 8 HOURS 12/20/21  Yes Weston Rose MD   sucralfate (Carafate) 1 g tablet Take 1 tablet by mouth 4 (Four) Times a Day. 5/26/22  Yes Paulina Guzmán APRN   tiZANidine (ZANAFLEX) 4 MG tablet Take 4 mg by mouth Every 8 (Eight) Hours As Needed. 10/25/21  Yes Weston Rose MD   topiramate (TOPAMAX) 100 MG tablet  11/26/22  Yes Weston Rose MD     Orders placed during this encounter include:  Orders Placed This Encounter   Procedures   • Obtain Informed Consent     Standing Status:   Future     Order Specific Question:   Informed Consent Given For     Answer:   colonoscopy     COLONOSCOPY (N/A)  New Medications Ordered This Visit   Medications   • dexlansoprazole (Dexilant) 60 MG capsule     Sig: Take 1 capsule by mouth Daily.     Dispense:  30 capsule     Refill:  5   • polyethylene glycol (MIRALAX) 17 GM/SCOOP powder     Sig: Take 17 g by mouth Daily.     Dispense:  255 g     Refill:  1         Review and/or  summary of lab tests, radiology, procedures, medications. Review and summary of old records and obtaining of history. The risks and benefits of my recommendations, as well as other treatment options were discussed . Any questions/concerned were answered. Patient voiced understanding and agreement.          This document has been electronically signed by PORTIA Montelongo on November 28, 2022 17:02 CST                                               Results for orders placed or performed in visit on 08/19/22   CBC Auto Differential    Specimen: Arm, Right; Blood   Result Value Ref Range    WBC 8.13 3.40 - 10.80 10*3/mm3    RBC 4.58 3.77 - 5.28 10*6/mm3    Hemoglobin 12.6 12.0 - 15.9 g/dL    Hematocrit 38.5 34.0 - 46.6 %    MCV 84.1 79.0 - 97.0 fL    MCH 27.5 26.6 - 33.0 pg    MCHC 32.7 31.5 - 35.7 g/dL    RDW 14.5 12.3 - 15.4 %    RDW-SD 44.1 37.0 - 54.0 fl    MPV 9.9 6.0 - 12.0 fL    Platelets 370 140 - 450 10*3/mm3    Neutrophil % 48.9 42.7 - 76.0 %    Lymphocyte % 39.0 19.6 - 45.3 %    Monocyte % 8.2 5.0 - 12.0 %    Eosinophil % 2.8 0.3 - 6.2 %    Basophil % 0.7 0.0 - 1.5 %    Immature Grans % 0.4 0.0 - 0.5 %    Neutrophils, Absolute 3.97 1.70 - 7.00 10*3/mm3    Lymphocytes, Absolute 3.17 (H) 0.70 - 3.10 10*3/mm3    Monocytes, Absolute 0.67 0.10 - 0.90 10*3/mm3    Eosinophils, Absolute 0.23 0.00 - 0.40 10*3/mm3    Basophils, Absolute 0.06 0.00 - 0.20 10*3/mm3    Immature Grans, Absolute 0.03 0.00 - 0.05 10*3/mm3    nRBC 0.0 0.0 - 0.2 /100 WBC   Iron and TIBC    Specimen: Arm, Right; Blood   Result Value Ref Range    Iron 80 37 - 145 mcg/dL    Iron Saturation 25 20 - 50 %    Transferrin 213 200 - 360 mg/dL    TIBC 317 298 - 536 mcg/dL   Folate    Specimen: Arm, Right; Blood   Result Value Ref Range    Folate 7.45 4.78 - 24.20 ng/mL   Ferritin    Specimen: Arm, Right; Blood   Result Value Ref Range    Ferritin 352.70 (H) 13.00 - 150.00 ng/mL   Vitamin B12    Specimen: Arm, Right; Blood   Result Value Ref Range     Vitamin B-12 359 211 - 946 pg/mL   Results for orders placed or performed in visit on 01/19/22   CBC Auto Differential    Specimen: Blood   Result Value Ref Range    WBC 8.11 3.40 - 10.80 10*3/mm3    RBC 4.58 3.77 - 5.28 10*6/mm3    Hemoglobin 12.6 12.0 - 15.9 g/dL    Hematocrit 39.1 34.0 - 46.6 %    MCV 85.4 79.0 - 97.0 fL    MCH 27.5 26.6 - 33.0 pg    MCHC 32.2 31.5 - 35.7 g/dL    RDW 15.7 (H) 12.3 - 15.4 %    RDW-SD 48.3 37.0 - 54.0 fl    MPV 9.8 6.0 - 12.0 fL    Platelets 382 140 - 450 10*3/mm3    Neutrophil % 50.6 42.7 - 76.0 %    Lymphocyte % 36.7 19.6 - 45.3 %    Monocyte % 7.9 5.0 - 12.0 %    Eosinophil % 3.7 0.3 - 6.2 %    Basophil % 0.7 0.0 - 1.5 %    Immature Grans % 0.4 0.0 - 0.5 %    Neutrophils, Absolute 4.10 1.70 - 7.00 10*3/mm3    Lymphocytes, Absolute 2.98 0.70 - 3.10 10*3/mm3    Monocytes, Absolute 0.64 0.10 - 0.90 10*3/mm3    Eosinophils, Absolute 0.30 0.00 - 0.40 10*3/mm3    Basophils, Absolute 0.06 0.00 - 0.20 10*3/mm3    Immature Grans, Absolute 0.03 0.00 - 0.05 10*3/mm3    nRBC 0.0 0.0 - 0.2 /100 WBC   Iron and TIBC    Specimen: Blood   Result Value Ref Range    Iron 74 37 - 145 mcg/dL    Iron Saturation 22 20 - 50 %    Transferrin 221 200 - 360 mg/dL    TIBC 329 298 - 536 mcg/dL   Folate    Specimen: Blood   Result Value Ref Range    Folate 7.22 4.78 - 24.20 ng/mL   Ferritin    Specimen: Blood   Result Value Ref Range    Ferritin 478.90 (H) 13.00 - 150.00 ng/mL   Vitamin B12    Specimen: Blood   Result Value Ref Range    Vitamin B-12 326 211 - 946 pg/mL   Results for orders placed or performed in visit on 12/21/21   Comprehensive Metabolic Panel    Specimen: Blood   Result Value Ref Range    Glucose 130 (H) 65 - 99 mg/dL    BUN 18 6 - 20 mg/dL    Creatinine 1.24 (H) 0.57 - 1.00 mg/dL    Sodium 143 136 - 145 mmol/L    Potassium 4.1 3.5 - 5.2 mmol/L    Chloride 108 (H) 98 - 107 mmol/L    CO2 27.0 22.0 - 29.0 mmol/L    Calcium 9.4 8.6 - 10.5 mg/dL    Total Protein 7.5 6.0 - 8.5 g/dL    Albumin  4.00 3.50 - 5.20 g/dL    ALT (SGPT) 11 1 - 33 U/L    AST (SGOT) 11 1 - 32 U/L    Alkaline Phosphatase 98 39 - 117 U/L    Total Bilirubin 0.2 0.0 - 1.2 mg/dL    eGFR Non African Amer 47 (L) >60 mL/min/1.73    Globulin 3.5 gm/dL    A/G Ratio 1.1 g/dL    BUN/Creatinine Ratio 14.5 7.0 - 25.0    Anion Gap 8.0 5.0 - 15.0 mmol/L   Results for orders placed or performed in visit on 12/21/21   CBC Auto Differential    Specimen: Blood   Result Value Ref Range    WBC 7.61 3.40 - 10.80 10*3/mm3    RBC 4.32 3.77 - 5.28 10*6/mm3    Hemoglobin 11.7 (L) 12.0 - 15.9 g/dL    Hematocrit 36.6 34.0 - 46.6 %    MCV 84.7 79.0 - 97.0 fL    MCH 27.1 26.6 - 33.0 pg    MCHC 32.0 31.5 - 35.7 g/dL    RDW 16.5 (H) 12.3 - 15.4 %    RDW-SD 51.5 37.0 - 54.0 fl    MPV 10.1 6.0 - 12.0 fL    Platelets 475 (H) 140 - 450 10*3/mm3    Neutrophil % 46.7 42.7 - 76.0 %    Lymphocyte % 36.5 19.6 - 45.3 %    Monocyte % 10.1 5.0 - 12.0 %    Eosinophil % 5.4 0.3 - 6.2 %    Basophil % 0.9 0.0 - 1.5 %    Immature Grans % 0.4 0.0 - 0.5 %    Neutrophils, Absolute 3.55 1.70 - 7.00 10*3/mm3    Lymphocytes, Absolute 2.78 0.70 - 3.10 10*3/mm3    Monocytes, Absolute 0.77 0.10 - 0.90 10*3/mm3    Eosinophils, Absolute 0.41 (H) 0.00 - 0.40 10*3/mm3    Basophils, Absolute 0.07 0.00 - 0.20 10*3/mm3    Immature Grans, Absolute 0.03 0.00 - 0.05 10*3/mm3    nRBC 0.0 0.0 - 0.2 /100 WBC     *Note: Due to a large number of results and/or encounters for the requested time period, some results have not been displayed. A complete set of results can be found in Results Review.

## 2022-12-13 ENCOUNTER — ANESTHESIA EVENT (OUTPATIENT)
Dept: GASTROENTEROLOGY | Facility: HOSPITAL | Age: 47
End: 2022-12-13

## 2022-12-13 ENCOUNTER — ANESTHESIA (OUTPATIENT)
Dept: GASTROENTEROLOGY | Facility: HOSPITAL | Age: 47
End: 2022-12-13

## 2022-12-13 ENCOUNTER — HOSPITAL ENCOUNTER (OUTPATIENT)
Facility: HOSPITAL | Age: 47
Setting detail: HOSPITAL OUTPATIENT SURGERY
Discharge: HOME | End: 2022-12-13
Attending: INTERNAL MEDICINE | Admitting: INTERNAL MEDICINE

## 2022-12-13 VITALS
BODY MASS INDEX: 47.09 KG/M2 | TEMPERATURE: 96.7 F | DIASTOLIC BLOOD PRESSURE: 78 MMHG | SYSTOLIC BLOOD PRESSURE: 137 MMHG | HEIGHT: 66 IN | HEART RATE: 63 BPM | WEIGHT: 293 LBS | RESPIRATION RATE: 16 BRPM | OXYGEN SATURATION: 100 %

## 2022-12-13 DIAGNOSIS — Z86.010 ENCOUNTER FOR COLONOSCOPY DUE TO HISTORY OF COLONIC POLYP: ICD-10-CM

## 2022-12-13 DIAGNOSIS — Z12.11 ENCOUNTER FOR COLONOSCOPY DUE TO HISTORY OF COLONIC POLYP: ICD-10-CM

## 2022-12-13 PROCEDURE — 25010000002 PROPOFOL 10 MG/ML EMULSION: Performed by: NURSE ANESTHETIST, CERTIFIED REGISTERED

## 2022-12-13 PROCEDURE — 45378 DIAGNOSTIC COLONOSCOPY: CPT | Performed by: INTERNAL MEDICINE

## 2022-12-13 RX ORDER — PROPOFOL 10 MG/ML
VIAL (ML) INTRAVENOUS AS NEEDED
Status: DISCONTINUED | OUTPATIENT
Start: 2022-12-13 | End: 2022-12-13 | Stop reason: SURG

## 2022-12-13 RX ORDER — DEXTROSE AND SODIUM CHLORIDE 5; .45 G/100ML; G/100ML
30 INJECTION, SOLUTION INTRAVENOUS CONTINUOUS PRN
Status: DISCONTINUED | OUTPATIENT
Start: 2022-12-13 | End: 2022-12-13 | Stop reason: HOSPADM

## 2022-12-13 RX ORDER — LIDOCAINE HYDROCHLORIDE 20 MG/ML
INJECTION, SOLUTION INTRAVENOUS AS NEEDED
Status: DISCONTINUED | OUTPATIENT
Start: 2022-12-13 | End: 2022-12-13 | Stop reason: SURG

## 2022-12-13 RX ADMIN — LIDOCAINE HYDROCHLORIDE 50 MG: 20 INJECTION, SOLUTION INTRAVENOUS at 16:00

## 2022-12-13 RX ADMIN — PROPOFOL 10 MG: 10 INJECTION, EMULSION INTRAVENOUS at 16:11

## 2022-12-13 RX ADMIN — DEXTROSE AND SODIUM CHLORIDE 30 ML/HR: 5; 450 INJECTION, SOLUTION INTRAVENOUS at 15:57

## 2022-12-13 RX ADMIN — PROPOFOL 100 MG: 10 INJECTION, EMULSION INTRAVENOUS at 16:00

## 2022-12-13 NOTE — ANESTHESIA POSTPROCEDURE EVALUATION
Patient: Michelle Lange    Procedure Summary     Date: 12/13/22 Room / Location: [facility] ENDOSCOPY 2 / [facility] ENDOSCOPY    Anesthesia Start: 1545 Anesthesia Stop: 1612    Procedure: COLONOSCOPY Diagnosis:       Encounter for colonoscopy due to history of colonic polyp      (Encounter for colonoscopy due to history of colonic polyp [Z12.11, Z86.010])    Surgeons: Michelle Lemus MD Provider: Taisha Toure CRNA    Anesthesia Type: general ASA Status: 3          Anesthesia Type: general    Vitals  No vitals data found for the desired time range.          Post Anesthesia Care and Evaluation    Patient location during evaluation: bedside  Patient participation: complete - patient participated  Level of consciousness: awake  Pain score: 0  Pain management: adequate    Airway patency: patent  Anesthetic complications: No anesthetic complications  PONV Status: none  Cardiovascular status: acceptable  Respiratory status: acceptable  Hydration status: acceptable

## 2022-12-13 NOTE — ANESTHESIA PREPROCEDURE EVALUATION
Anesthesia Evaluation     Patient summary reviewed and Nursing notes reviewed   no history of anesthetic complications:  NPO Solid Status: > 8 hours  NPO Liquid Status: > 2 hours           Airway   Mallampati: II  TM distance: >3 FB  Neck ROM: full  possible difficult intubation, Large neck circumference and Small opening  Dental    (+) poor dentation    Pulmonary    (+) asthma,decreased breath sounds,   (-) shortness of breath, sleep apnea, not a smoker    ROS comment: snores  PE comment: Albuterol treatment ordered pre-op.  Cardiovascular - normal exam  Exercise tolerance: poor (<4 METS)    ECG reviewed  Rhythm: regular  Rate: normal    (+) hypertension well controlled 2 medications or greater,   (-) valvular problems/murmurs, dysrhythmias, angina, [place], murmur, carotid bruits, cardiac stents, DVT, hyperlipidemia    ROS comment: Normal sinus rhythm  Voltage criteria for left ventricular hypertrophy  Nonspecific T wave abnormality  Prolonged QT  Abnormal ECG  When compared with ECG of 22-JUL-2013 12:54,  Fusion complexes are no longer Present  Premature ventricular complexes are no longer Present  Nonspecific T wave abnormality now evident in Anterolateral leads  QT has lengthened    Neuro/Psych  (+) headaches (Migraines weekly), numbness (left anterior thigh and right sciatic), psychiatric history Anxiety and Depression,    (-) seizures, TIA, CVA  GI/Hepatic/Renal/Endo    (+) morbid obesity, GERD well controlled,  renal disease CRI,   (-) hepatitis, liver disease, diabetes    ROS Comment: Previous gastric sleeve procedure.    Musculoskeletal     (+) arthralgias,   Abdominal   (+) obese,    Substance History - negative use     OB/GYN negative ob/gyn ROS         Other   arthritis (hands, knees, ankles and toes),      ROS/Med Hx Other:   Post endometrial ablation syndrome                    Anesthesia Plan    ASA 3     general     (ortega available  Possible arterial line due to habitus)  intravenous induction      Anesthetic plan, risks, benefits, and alternatives have been provided, discussed and informed consent has been obtained with: patient and spouse/significant other.    Use of blood products discussed with patient  Consented to blood products.

## 2022-12-16 ENCOUNTER — APPOINTMENT (OUTPATIENT)
Dept: ONCOLOGY | Facility: CLINIC | Age: 47
End: 2022-12-16

## 2022-12-16 ENCOUNTER — APPOINTMENT (OUTPATIENT)
Dept: ONCOLOGY | Facility: HOSPITAL | Age: 47
End: 2022-12-16

## 2023-01-03 ENCOUNTER — LAB (OUTPATIENT)
Dept: ONCOLOGY | Facility: HOSPITAL | Age: 48
End: 2023-01-03
Payer: MEDICAID

## 2023-01-03 ENCOUNTER — OFFICE VISIT (OUTPATIENT)
Dept: ONCOLOGY | Facility: CLINIC | Age: 48
End: 2023-01-03
Payer: MEDICAID

## 2023-01-03 VITALS
WEIGHT: 293 LBS | TEMPERATURE: 98 F | HEART RATE: 70 BPM | SYSTOLIC BLOOD PRESSURE: 161 MMHG | OXYGEN SATURATION: 100 % | BODY MASS INDEX: 49.05 KG/M2 | DIASTOLIC BLOOD PRESSURE: 67 MMHG

## 2023-01-03 DIAGNOSIS — E53.8 FOLATE DEFICIENCY: ICD-10-CM

## 2023-01-03 DIAGNOSIS — E53.8 FOLATE DEFICIENCY: Chronic | ICD-10-CM

## 2023-01-03 DIAGNOSIS — D75.839 THROMBOCYTOSIS: ICD-10-CM

## 2023-01-03 DIAGNOSIS — D75.839 THROMBOCYTOSIS: Chronic | ICD-10-CM

## 2023-01-03 DIAGNOSIS — D50.0 IRON DEFICIENCY ANEMIA DUE TO CHRONIC BLOOD LOSS: ICD-10-CM

## 2023-01-03 DIAGNOSIS — D50.0 IRON DEFICIENCY ANEMIA DUE TO CHRONIC BLOOD LOSS: Primary | Chronic | ICD-10-CM

## 2023-01-03 DIAGNOSIS — T45.4X5A ADVERSE EFFECT OF IRON, INITIAL ENCOUNTER: ICD-10-CM

## 2023-01-03 DIAGNOSIS — T45.4X5A ADVERSE EFFECT OF IRON, INITIAL ENCOUNTER: Chronic | ICD-10-CM

## 2023-01-03 LAB
BASOPHILS # BLD AUTO: 0.09 10*3/MM3 (ref 0–0.2)
BASOPHILS NFR BLD AUTO: 0.9 % (ref 0–1.5)
DEPRECATED RDW RBC AUTO: 45.2 FL (ref 37–54)
EOSINOPHIL # BLD AUTO: 0.27 10*3/MM3 (ref 0–0.4)
EOSINOPHIL NFR BLD AUTO: 2.7 % (ref 0.3–6.2)
ERYTHROCYTE [DISTWIDTH] IN BLOOD BY AUTOMATED COUNT: 14.8 % (ref 12.3–15.4)
FERRITIN SERPL-MCNC: 487.3 NG/ML (ref 13–150)
FOLATE SERPL-MCNC: 4.94 NG/ML (ref 4.78–24.2)
HCT VFR BLD AUTO: 41.3 % (ref 34–46.6)
HGB BLD-MCNC: 13.1 G/DL (ref 12–15.9)
IMM GRANULOCYTES # BLD AUTO: 0.04 10*3/MM3 (ref 0–0.05)
IMM GRANULOCYTES NFR BLD AUTO: 0.4 % (ref 0–0.5)
IRON 24H UR-MRATE: 80 MCG/DL (ref 37–145)
IRON SATN MFR SERPL: 23 % (ref 20–50)
LYMPHOCYTES # BLD AUTO: 3.36 10*3/MM3 (ref 0.7–3.1)
LYMPHOCYTES NFR BLD AUTO: 33.8 % (ref 19.6–45.3)
MCH RBC QN AUTO: 26.9 PG (ref 26.6–33)
MCHC RBC AUTO-ENTMCNC: 31.7 G/DL (ref 31.5–35.7)
MCV RBC AUTO: 84.8 FL (ref 79–97)
MONOCYTES # BLD AUTO: 0.8 10*3/MM3 (ref 0.1–0.9)
MONOCYTES NFR BLD AUTO: 8 % (ref 5–12)
NEUTROPHILS NFR BLD AUTO: 5.38 10*3/MM3 (ref 1.7–7)
NEUTROPHILS NFR BLD AUTO: 54.2 % (ref 42.7–76)
NRBC BLD AUTO-RTO: 0 /100 WBC (ref 0–0.2)
PLATELET # BLD AUTO: 400 10*3/MM3 (ref 140–450)
PMV BLD AUTO: 10.3 FL (ref 6–12)
RBC # BLD AUTO: 4.87 10*6/MM3 (ref 3.77–5.28)
TIBC SERPL-MCNC: 343 MCG/DL (ref 298–536)
TRANSFERRIN SERPL-MCNC: 230 MG/DL (ref 200–360)
VIT B12 BLD-MCNC: 329 PG/ML (ref 211–946)
WBC NRBC COR # BLD: 9.94 10*3/MM3 (ref 3.4–10.8)

## 2023-01-03 PROCEDURE — 82728 ASSAY OF FERRITIN: CPT

## 2023-01-03 PROCEDURE — 82607 VITAMIN B-12: CPT

## 2023-01-03 PROCEDURE — G0463 HOSPITAL OUTPT CLINIC VISIT: HCPCS | Performed by: INTERNAL MEDICINE

## 2023-01-03 PROCEDURE — 85025 COMPLETE CBC W/AUTO DIFF WBC: CPT

## 2023-01-03 PROCEDURE — 83540 ASSAY OF IRON: CPT

## 2023-01-03 PROCEDURE — 1160F RVW MEDS BY RX/DR IN RCRD: CPT | Performed by: INTERNAL MEDICINE

## 2023-01-03 PROCEDURE — 82746 ASSAY OF FOLIC ACID SERUM: CPT

## 2023-01-03 PROCEDURE — 84466 ASSAY OF TRANSFERRIN: CPT

## 2023-01-03 PROCEDURE — 99214 OFFICE O/P EST MOD 30 MIN: CPT | Performed by: INTERNAL MEDICINE

## 2023-01-03 PROCEDURE — 1125F AMNT PAIN NOTED PAIN PRSNT: CPT | Performed by: INTERNAL MEDICINE

## 2023-01-03 PROCEDURE — 1159F MED LIST DOCD IN RCRD: CPT | Performed by: INTERNAL MEDICINE

## 2023-01-03 RX ORDER — BENZONATATE 200 MG/1
CAPSULE ORAL
COMMUNITY
Start: 2022-12-04

## 2023-01-03 RX ORDER — CETIRIZINE HYDROCHLORIDE 10 MG/1
10 TABLET ORAL DAILY
COMMUNITY
Start: 2022-12-04

## 2023-01-03 RX ORDER — FLUTICASONE PROPIONATE 50 MCG
2 SPRAY, SUSPENSION (ML) NASAL DAILY
COMMUNITY
Start: 2022-12-04

## 2023-01-03 RX ORDER — METHYLPREDNISOLONE 4 MG/1
TABLET ORAL
COMMUNITY
Start: 2022-12-04

## 2023-01-03 RX ORDER — UBIDECARENONE 75 MG
1 CAPSULE ORAL DAILY
COMMUNITY
Start: 2022-11-30

## 2023-01-03 RX ORDER — DOXYCYCLINE 100 MG/1
100 CAPSULE ORAL 2 TIMES DAILY
COMMUNITY
Start: 2022-12-04

## 2023-01-03 NOTE — PROGRESS NOTES
DATE OF VISIT: 1/3/2023      REASON FOR VISIT: Anemia, thrombocytosis      HISTORY OF PRESENT ILLNESS:   47-year-old female with medical problem consisting of hypertension, plantar fasciitis, endometrial ablation in 2014, gastric sleeve surgery in 2017 who was initially seen in consultation on August 2, 2021 for anemia and thrombocytosis.  Due to iron malabsorption and adverse effect patient received Injectafer in the past.  Patient is here for follow-up appointment today.  Complains of fatigue.  Complains of chronic arthralgia.  Denies any bleeding.  Denies any new lymph node enlargement              Past Medical History, Past Surgical History, Social History, Family History have been reviewed and are without significant changes except as mentioned.    Review of Systems   A comprehensive 14 point review of systems was performed and was negative except as mentioned in HPI.    Medications:  The current medication list was reviewed in the EMR    ALLERGIES:    Allergies   Allergen Reactions   • Ibuprofen Anaphylaxis   • Phenergan [Promethazine] Other (See Comments)     Pt states arms and legs jump uncontrollably        Objective      Vitals:    01/03/23 1433   BP: 161/67   Pulse: 70   Temp: 98 °F (36.7 °C)   TempSrc: Temporal   SpO2: 100%   Weight: (!) 138 kg (303 lb 14.4 oz)   PainSc:   5   PainLoc: Generalized     No flowsheet data found.    Physical Exam      RECENT LABS:  Glucose   Date Value Ref Range Status   12/21/2021 130 (H) 65 - 99 mg/dL Final     Sodium   Date Value Ref Range Status   06/10/2022 140 136 - 145 mmol/L Final     Potassium   Date Value Ref Range Status   06/10/2022 4.2 3.5 - 5.1 mmol/L Final     Total CO2   Date Value Ref Range Status   06/10/2022 28 21 - 31 mmol/L Final     Chloride   Date Value Ref Range Status   06/10/2022 105 98 - 107 mmol/L Final     Anion Gap   Date Value Ref Range Status   12/21/2021 8.0 5.0 - 15.0 mmol/L Final     Creatinine   Date Value Ref Range Status   06/10/2022 1.2  0.7 - 1.3 mg/dL Final     BUN   Date Value Ref Range Status   06/10/2022 18 7 - 25 mg/dL Final     BUN/Creatinine Ratio   Date Value Ref Range Status   12/21/2021 14.5 7.0 - 25.0 Final     Calcium   Date Value Ref Range Status   06/10/2022 9.1 8.6 - 10.3 mg/dL Final     eGFR Non  Amer   Date Value Ref Range Status   12/21/2021 47 (L) >60 mL/min/1.73 Final     Alkaline Phosphatase   Date Value Ref Range Status   06/10/2022 93 34 - 104 U/L Final     Total Protein   Date Value Ref Range Status   12/21/2021 7.5 6.0 - 8.5 g/dL Final     ALT (SGPT)   Date Value Ref Range Status   06/10/2022 14 7 - 52 U/L Final     AST (SGOT)   Date Value Ref Range Status   06/10/2022 12 (L) 13 - 39 U/L Final     Total Bilirubin   Date Value Ref Range Status   06/10/2022 0.39 0.3 - 1.0 mg/dL Final     Albumin   Date Value Ref Range Status   06/10/2022 3.8 3.5 - 5.7 g/dL Final     Globulin   Date Value Ref Range Status   12/21/2021 3.5 gm/dL Final     Lab Results   Component Value Date    WBC 9.94 01/03/2023    HGB 13.1 01/03/2023    HCT 41.3 01/03/2023    MCV 84.8 01/03/2023     01/03/2023     Lab Results   Component Value Date    NEUTROABS 5.38 01/03/2023    IRON 80 08/19/2022    IRON 74 01/19/2022    IRON 37 10/04/2021    TIBC 317 08/19/2022    TIBC 329 01/19/2022    TIBC 353 10/04/2021    LABIRON 25 08/19/2022    LABIRON 22 01/19/2022    LABIRON 10 (L) 10/04/2021    FERRITIN 352.70 (H) 08/19/2022    FERRITIN 478.90 (H) 01/19/2022    FERRITIN 74.62 10/04/2021    [id number] 359 08/19/2022    [id number] 326 01/19/2022    [id number] 349 10/04/2021    FOLATE 7.45 08/19/2022    FOLATE 7.22 01/19/2022    FOLATE 5.98 10/04/2021     Lab Results   Component Value Date    HCGQUANT 5.41 (H) 06/01/2018         PATHOLOGY:  * Cannot find OR log *         RADIOLOGY DATA :  No radiology results for the last 7 days        Assessment & Plan     1.  Anemia:  - Due to iron malabsorption and adverse effect patient received Injectafer in  November 2021  - Anemia work-up done today shows hemoglobin is 13.1.  Iron studies are adequate, no need for any iron replacement at present.  - Due to gastric bypass status, remains on B12 and folic acid p.o. daily.  B12 level is decreased to 329 consistent with B12 malabsorption.  Recommend starting intramuscular B12 injection once a month.  - Patient will return to clinic in 4 months with repeat CBC, iron studies, ferritin, B12 and folate to be done on that day.    2.  Folate deficiency:  - Currently on folic acid p.o. daily  - Folate level is decreased to 4.9.  Recommend increasing folic acid to 1 tablet twice daily.    3.  Thrombocytosis:  - Reactive due to iron deficiency  - Platelet count is 400,000.    4.  History of gastric sleeve surgery    5.  Health maintenance: Patient does not smoke.  Had EGD and colonoscopy in September 2021                     PHQ-9 Total Score: 0   -Patient is not homicidal or suicidal.  No acute intervention required.    Michelle Lange reports a pain score of 5.  Given her pain assessment as noted, treatment options were discussed and the following options were decided upon as a follow-up plan to address the patient's pain: continuation of current treatment plan for pain.         Bg Segura MD  1/3/2023  14:55 CST        Part of this note may be an electronic transcription/translation of spoken language to printed text using the Dragon Dictation System.          CC:

## 2023-01-04 RX ORDER — FOLIC ACID 1 MG/1
1 TABLET ORAL 2 TIMES DAILY
Qty: 180 TABLET | Refills: 1 | Status: SHIPPED | OUTPATIENT
Start: 2023-01-04

## 2023-01-05 ENCOUNTER — TELEPHONE (OUTPATIENT)
Dept: ONCOLOGY | Facility: CLINIC | Age: 48
End: 2023-01-05
Payer: MEDICAID

## 2023-01-05 PROBLEM — E53.8 B12 DEFICIENCY: Status: ACTIVE | Noted: 2023-01-05

## 2023-01-05 RX ORDER — CYANOCOBALAMIN 1000 UG/ML
1000 INJECTION, SOLUTION INTRAMUSCULAR; SUBCUTANEOUS ONCE
Status: CANCELLED | OUTPATIENT
Start: 2023-01-12

## 2023-01-05 NOTE — TELEPHONE ENCOUNTER
Called and spoke with pt regarding lab results and medication instructions as per Dr. Segura. [facility] staff to make her injection appts. V/u obtained.

## 2023-01-05 NOTE — TELEPHONE ENCOUNTER
----- Message from Bg Segura MD sent at 1/4/2023  7:00 AM CST -----  Please let patient know, her iron studies are adequate.  No need for any intravenous iron replacement at present.  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  Recommend starting B12 injection once a month.  If she wants to come here for B12 injection let me know I will enter the B12 injection plan.  Her folate level is also not showing any improvement folate level is decreased to 4.9 as compared to 7 last clinic visit.  Recommend increasing folic acid to 1 tablet twice daily.  Prescription with new dose of folic acid has been sent to her pharmacy today.  Thank you

## 2023-02-02 ENCOUNTER — INFUSION (OUTPATIENT)
Dept: ONCOLOGY | Facility: HOSPITAL | Age: 48
End: 2023-02-02
Payer: MEDICAID

## 2023-02-02 VITALS
RESPIRATION RATE: 20 BRPM | HEART RATE: 67 BPM | DIASTOLIC BLOOD PRESSURE: 83 MMHG | TEMPERATURE: 98 F | SYSTOLIC BLOOD PRESSURE: 138 MMHG

## 2023-02-02 DIAGNOSIS — E53.8 B12 DEFICIENCY: Primary | ICD-10-CM

## 2023-02-02 PROCEDURE — 96372 THER/PROPH/DIAG INJ SC/IM: CPT | Performed by: NURSE PRACTITIONER

## 2023-02-02 PROCEDURE — 25010000002 CYANOCOBALAMIN PER 1000 MCG: Performed by: INTERNAL MEDICINE

## 2023-02-02 RX ORDER — CYANOCOBALAMIN 1000 UG/ML
1000 INJECTION, SOLUTION INTRAMUSCULAR; SUBCUTANEOUS ONCE
Status: COMPLETED | OUTPATIENT
Start: 2023-02-02 | End: 2023-02-02

## 2023-02-02 RX ORDER — CYANOCOBALAMIN 1000 UG/ML
1000 INJECTION, SOLUTION INTRAMUSCULAR; SUBCUTANEOUS ONCE
Status: CANCELLED | OUTPATIENT
Start: 2023-03-02

## 2023-02-02 RX ADMIN — CYANOCOBALAMIN 1000 MCG: 1000 INJECTION, SOLUTION INTRAMUSCULAR at 13:24

## 2023-03-02 ENCOUNTER — INFUSION (OUTPATIENT)
Dept: ONCOLOGY | Facility: HOSPITAL | Age: 48
End: 2023-03-02
Payer: MEDICAID

## 2023-03-02 DIAGNOSIS — E53.8 B12 DEFICIENCY: Primary | ICD-10-CM

## 2023-03-02 PROCEDURE — 25010000002 CYANOCOBALAMIN PER 1000 MCG: Performed by: INTERNAL MEDICINE

## 2023-03-02 PROCEDURE — 96372 THER/PROPH/DIAG INJ SC/IM: CPT | Performed by: INTERNAL MEDICINE

## 2023-03-02 RX ORDER — CYANOCOBALAMIN 1000 UG/ML
1000 INJECTION, SOLUTION INTRAMUSCULAR; SUBCUTANEOUS ONCE
OUTPATIENT
Start: 2023-03-30

## 2023-03-02 RX ORDER — CYANOCOBALAMIN 1000 UG/ML
1000 INJECTION, SOLUTION INTRAMUSCULAR; SUBCUTANEOUS ONCE
Status: COMPLETED | OUTPATIENT
Start: 2023-03-02 | End: 2023-03-02

## 2023-03-02 RX ADMIN — CYANOCOBALAMIN 1000 MCG: 1000 INJECTION, SOLUTION INTRAMUSCULAR at 12:44

## 2023-05-09 ENCOUNTER — LAB (OUTPATIENT)
Dept: ONCOLOGY | Facility: HOSPITAL | Age: 48
End: 2023-05-09
Payer: MEDICAID

## 2023-05-09 ENCOUNTER — INFUSION (OUTPATIENT)
Dept: ONCOLOGY | Facility: HOSPITAL | Age: 48
End: 2023-05-09
Payer: MEDICAID

## 2023-05-09 ENCOUNTER — OFFICE VISIT (OUTPATIENT)
Dept: ONCOLOGY | Facility: CLINIC | Age: 48
End: 2023-05-09
Payer: MEDICAID

## 2023-05-09 VITALS
TEMPERATURE: 97.6 F | BODY MASS INDEX: 47.11 KG/M2 | DIASTOLIC BLOOD PRESSURE: 80 MMHG | SYSTOLIC BLOOD PRESSURE: 134 MMHG | WEIGHT: 291.9 LBS | HEART RATE: 73 BPM | OXYGEN SATURATION: 91 % | RESPIRATION RATE: 16 BRPM

## 2023-05-09 DIAGNOSIS — E53.8 FOLATE DEFICIENCY: ICD-10-CM

## 2023-05-09 DIAGNOSIS — E53.8 B12 DEFICIENCY: Primary | ICD-10-CM

## 2023-05-09 DIAGNOSIS — E53.8 FOLATE DEFICIENCY: Chronic | ICD-10-CM

## 2023-05-09 DIAGNOSIS — D75.839 THROMBOCYTOSIS: ICD-10-CM

## 2023-05-09 DIAGNOSIS — T45.4X5A ADVERSE EFFECT OF IRON, INITIAL ENCOUNTER: ICD-10-CM

## 2023-05-09 DIAGNOSIS — D75.839 THROMBOCYTOSIS: Chronic | ICD-10-CM

## 2023-05-09 DIAGNOSIS — D50.0 IRON DEFICIENCY ANEMIA DUE TO CHRONIC BLOOD LOSS: ICD-10-CM

## 2023-05-09 DIAGNOSIS — D50.0 IRON DEFICIENCY ANEMIA DUE TO CHRONIC BLOOD LOSS: Primary | Chronic | ICD-10-CM

## 2023-05-09 LAB
BASOPHILS # BLD AUTO: 0.11 10*3/MM3 (ref 0–0.2)
BASOPHILS NFR BLD AUTO: 1.3 % (ref 0–1.5)
DEPRECATED RDW RBC AUTO: 43.8 FL (ref 37–54)
EOSINOPHIL # BLD AUTO: 0.26 10*3/MM3 (ref 0–0.4)
EOSINOPHIL NFR BLD AUTO: 3 % (ref 0.3–6.2)
ERYTHROCYTE [DISTWIDTH] IN BLOOD BY AUTOMATED COUNT: 13.7 % (ref 12.3–15.4)
FERRITIN SERPL-MCNC: 458.3 NG/ML (ref 13–150)
FOLATE SERPL-MCNC: 6.28 NG/ML (ref 4.78–24.2)
HCT VFR BLD AUTO: 40 % (ref 34–46.6)
HGB BLD-MCNC: 13 G/DL (ref 12–15.9)
IMM GRANULOCYTES # BLD AUTO: 0.03 10*3/MM3 (ref 0–0.05)
IMM GRANULOCYTES NFR BLD AUTO: 0.3 % (ref 0–0.5)
IRON 24H UR-MRATE: 84 MCG/DL (ref 37–145)
IRON SATN MFR SERPL: 24 % (ref 20–50)
LYMPHOCYTES # BLD AUTO: 3.12 10*3/MM3 (ref 0.7–3.1)
LYMPHOCYTES NFR BLD AUTO: 35.5 % (ref 19.6–45.3)
MCH RBC QN AUTO: 28.4 PG (ref 26.6–33)
MCHC RBC AUTO-ENTMCNC: 32.5 G/DL (ref 31.5–35.7)
MCV RBC AUTO: 87.5 FL (ref 79–97)
MONOCYTES # BLD AUTO: 0.85 10*3/MM3 (ref 0.1–0.9)
MONOCYTES NFR BLD AUTO: 9.7 % (ref 5–12)
NEUTROPHILS NFR BLD AUTO: 4.42 10*3/MM3 (ref 1.7–7)
NEUTROPHILS NFR BLD AUTO: 50.2 % (ref 42.7–76)
NRBC BLD AUTO-RTO: 0 /100 WBC (ref 0–0.2)
PLATELET # BLD AUTO: 415 10*3/MM3 (ref 140–450)
PMV BLD AUTO: 10.5 FL (ref 6–12)
RBC # BLD AUTO: 4.57 10*6/MM3 (ref 3.77–5.28)
TIBC SERPL-MCNC: 347 MCG/DL (ref 298–536)
TRANSFERRIN SERPL-MCNC: 233 MG/DL (ref 200–360)
VIT B12 BLD-MCNC: 432 PG/ML (ref 211–946)
WBC NRBC COR # BLD: 8.79 10*3/MM3 (ref 3.4–10.8)

## 2023-05-09 RX ORDER — CYANOCOBALAMIN 1000 UG/ML
1000 INJECTION, SOLUTION INTRAMUSCULAR; SUBCUTANEOUS ONCE
Status: COMPLETED | OUTPATIENT
Start: 2023-05-09 | End: 2023-05-09

## 2023-05-09 RX ORDER — CYANOCOBALAMIN 1000 UG/ML
1000 INJECTION, SOLUTION INTRAMUSCULAR; SUBCUTANEOUS ONCE
OUTPATIENT
Start: 2023-05-25

## 2023-05-09 RX ADMIN — CYANOCOBALAMIN 1000 MCG: 1000 INJECTION, SOLUTION INTRAMUSCULAR; SUBCUTANEOUS at 13:56

## 2023-05-09 NOTE — PROGRESS NOTES
DATE OF VISIT: 5/9/2023      REASON FOR VISIT: Anemia, thrombocytosis      HISTORY OF PRESENT ILLNESS:   48-year-old female with medical problem consisting of hypertension, plantar fasciitis, endometrial ablation in 2014, gastric surgery in 2017 who has been following up with [facility] since August 2, 2021 for anemia and thrombocytosis.  Due to iron malabsorption patient has received intravenous Injectafer in the past.  Patient is here for follow-up appointment today.  Complains of fatigue.  Complains of chronic arthralgia.  Denies any bleeding.  Denies any new lymph node enlargement.              Past Medical History, Past Surgical History, Social History, Family History have been reviewed and are without significant changes except as mentioned.    Review of Systems   A comprehensive 14 point review of systems was performed and was negative except as mentioned in HPI.    Medications:  The current medication list was reviewed in the EMR    ALLERGIES:    Allergies   Allergen Reactions   • Ibuprofen Anaphylaxis   • Phenergan [Promethazine] Other (See Comments)     Pt states arms and legs jump uncontrollably        Objective      Vitals:    05/09/23 1313   BP: 134/80   Pulse: 73   Resp: 16   Temp: 97.6 °F (36.4 °C)   SpO2: 91%   Weight: 132 kg (291 lb 14.4 oz)   PainSc:   5         5/9/2023     2:10 PM   Current Status   ECOG score 0       Physical Exam  Pulmonary:      Breath sounds: Normal breath sounds.   Neurological:      Mental Status: She is alert and oriented to person, place, and time.           RECENT LABS:  Glucose   Date Value Ref Range Status   12/21/2021 130 (H) 65 - 99 mg/dL Final     Sodium   Date Value Ref Range Status   03/15/2023 144 136 - 145 mmol/L Final     Potassium   Date Value Ref Range Status   03/15/2023 4.3 3.5 - 5.1 mmol/L Final     Total CO2   Date Value Ref Range Status   03/15/2023 25 21 - 31 mmol/L Final     Chloride   Date Value Ref Range Status   03/15/2023 108 (H) 98 - 107  mmol/L Final     Anion Gap   Date Value Ref Range Status   03/15/2023 11 4 - 12 mmol/L Final     Creatinine   Date Value Ref Range Status   03/15/2023 1.1 0.7 - 1.3 mg/dL Final     BUN   Date Value Ref Range Status   03/15/2023 20 7 - 25 mg/dL Final     BUN/Creatinine Ratio   Date Value Ref Range Status   12/21/2021 14.5 7.0 - 25.0 Final     Calcium   Date Value Ref Range Status   03/15/2023 9.8 8.6 - 10.3 mg/dL Final     eGFR Non  Amer   Date Value Ref Range Status   12/21/2021 47 (L) >60 mL/min/1.73 Final     Alkaline Phosphatase   Date Value Ref Range Status   03/15/2023 93 34 - 104 U/L Final     Total Protein   Date Value Ref Range Status   12/21/2021 7.5 6.0 - 8.5 g/dL Final     ALT (SGPT)   Date Value Ref Range Status   03/15/2023 22 7 - 52 U/L Final     AST (SGOT)   Date Value Ref Range Status   03/15/2023 17 13 - 39 U/L Final     Total Bilirubin   Date Value Ref Range Status   03/15/2023 0.45 0.3 - 1.0 mg/dL Final     Albumin   Date Value Ref Range Status   03/15/2023 4.1 3.5 - 5.7 g/dL Final     Globulin   Date Value Ref Range Status   12/21/2021 3.5 gm/dL Final     Lab Results   Component Value Date    WBC 8.79 05/09/2023    HGB 13.0 05/09/2023    HCT 40.0 05/09/2023    MCV 87.5 05/09/2023     05/09/2023     Lab Results   Component Value Date    NEUTROABS 4.42 05/09/2023    IRON 84 05/09/2023    IRON 80 01/03/2023    IRON 80 08/19/2022    TIBC 347 05/09/2023    TIBC 343 01/03/2023    TIBC 317 08/19/2022    LABIRON 24 05/09/2023    LABIRON 23 01/03/2023    LABIRON 25 08/19/2022    FERRITIN 458.30 (H) 05/09/2023    FERRITIN 487.30 (H) 01/03/2023    FERRITIN 352.70 (H) 08/19/2022    VNNMUOBJ78 329 01/03/2023    CTVAOKGX68 359 08/19/2022    UVSFUODE24 326 01/19/2022    FOLATE 4.94 01/03/2023    FOLATE 7.45 08/19/2022    FOLATE 7.22 01/19/2022     Lab Results   Component Value Date    HCGQUANT 5.41 (H) 06/01/2018         PATHOLOGY:  * Cannot find OR log *         RADIOLOGY DATA :  No radiology  results for the last 7 days        Assessment & Plan     1.  Anemia:  - Due to iron malabsorption and adverse effect patient received Injectafer in November 2021  - Anemia work-up done today shows hemoglobin is 13.  Iron saturation is 24% with ferritin of 458.  There is no need for any intravenous iron replacement at present  - Due to gastric bypass status patient remains on B12 injection monthly.  - We will have patient return to clinic in 4 months with repeat CBC, iron studies, ferritin, B12 and folate to be done on that day.      2.  Folate deficiency  - Patient is currently on folic acid p.o. daily  - Folate level is 6.28.  Recommend increasing folic acid to twice daily.  Prescription for folic acid has been sent to patient's pharmacy today.    3.  Thrombocytosis:  - Reactive due to iron deficiency  - Platelet count is 415,000.      4.  History of gastric sleeve surgery.    5.  Health maintenance: Patient does not smoke.  Had a EGD and colonoscopy in September 2021                   PHQ-9 Total Score: 0   -Patient is not homicidal or suicidal.  No acute intervention required.    Michelle Lange reports a pain score of 5.  Given her pain assessment as noted, treatment options were discussed and the following options were decided upon as a follow-up plan to address the patient's pain: continuation of current treatment plan for pain.         Bg Segura MD  5/9/2023  17:23 CDT        Part of this note may be an electronic transcription/translation of spoken language to printed text using the Dragon Dictation System.          CC:

## 2023-05-10 ENCOUNTER — TELEPHONE (OUTPATIENT)
Dept: ONCOLOGY | Facility: CLINIC | Age: 48
End: 2023-05-10
Payer: MEDICAID

## 2023-05-10 RX ORDER — FOLIC ACID 1 MG/1
1 TABLET ORAL 2 TIMES DAILY
Qty: 180 TABLET | Refills: 1 | Status: SHIPPED | OUTPATIENT
Start: 2023-05-10

## 2023-05-10 NOTE — TELEPHONE ENCOUNTER
----- Message from Bg Segura MD sent at 5/10/2023  6:42 AM CDT -----  Please let patient know, her iron studies are adequate.  No need for any intravenous iron replacement at present.  Recommend continue with monthly B12 injection.  Recommend increasing folic acid to 1 mg twice daily.  Prescription for folic acid has been sent to patient's pharmacy today.  Thank you

## 2023-05-12 ENCOUNTER — TELEPHONE (OUTPATIENT)
Dept: ONCOLOGY | Facility: CLINIC | Age: 48
End: 2023-05-12
Payer: MEDICAID

## 2023-05-12 NOTE — TELEPHONE ENCOUNTER
Called and spoke with pt regarding lab results and medication instructions as per Dr. Segura, v/u obtained.

## 2023-05-25 ENCOUNTER — OFFICE VISIT (OUTPATIENT)
Dept: GASTROENTEROLOGY | Facility: CLINIC | Age: 48
End: 2023-05-25
Payer: MEDICAID

## 2023-05-25 VITALS
WEIGHT: 286.6 LBS | BODY MASS INDEX: 46.06 KG/M2 | SYSTOLIC BLOOD PRESSURE: 122 MMHG | HEART RATE: 72 BPM | HEIGHT: 66 IN | DIASTOLIC BLOOD PRESSURE: 68 MMHG

## 2023-05-25 DIAGNOSIS — K21.00 GASTROESOPHAGEAL REFLUX DISEASE WITH ESOPHAGITIS WITHOUT HEMORRHAGE: Primary | ICD-10-CM

## 2023-05-25 PROCEDURE — 1159F MED LIST DOCD IN RCRD: CPT | Performed by: NURSE PRACTITIONER

## 2023-05-25 PROCEDURE — 1160F RVW MEDS BY RX/DR IN RCRD: CPT | Performed by: NURSE PRACTITIONER

## 2023-05-25 PROCEDURE — 99213 OFFICE O/P EST LOW 20 MIN: CPT | Performed by: NURSE PRACTITIONER

## 2023-05-25 RX ORDER — DEXLANSOPRAZOLE 60 MG/1
60 CAPSULE, DELAYED RELEASE ORAL DAILY
Qty: 30 CAPSULE | Refills: 5 | Status: SHIPPED | OUTPATIENT
Start: 2023-05-25

## 2023-05-25 NOTE — PROGRESS NOTES
Chief Complaint   Patient presents with   • Heartburn   • 6 mth f/u        Subjective    Michelle Lange is a 48 y.o. female. she is here today for follow-up.                                                                  Assessment & Plan                                     1. Gastroesophageal reflux disease with esophagitis without hemorrhage      Plan; continue dexilant  Daily encouraged to avoid gastric irritants follow standard antireflux measures congratulated on weight loss recommend she continue with diet lifestyle modifications follow-up in 6 months for recheck return office sooner if needed lab work checked at PCP office 2 months ago renal function has been stable on medication    Follow-up: Return in about 6 months (around 11/25/2023) for Recheck.     HPI  48-year-old female presents to discuss GERD and colonoscopy results.  States reflux is well controlled when she takes her Dexilant daily has not had to take Carafate in several months.  Has had dental work done recently and has been following soft or liquid diet bowel movements have been less frequent approximately 1 time per week but states she is also added Topamax and has been losing weight and trying to cut back her intake.  She denies any change in stool caliber melena or hematochezia.  Colonoscopy completed 12/13/2022 noted normal-appearing colon hemorrhoids were seen repeat recommended in 10 years for surveillance.    Review of Systems  Review of Systems   Constitutional: Positive for fatigue. Negative for activity change, appetite change, chills, diaphoresis, fever and unexpected weight change.   HENT: Negative for sore throat and trouble swallowing.    Respiratory: Negative for shortness of breath.    Gastrointestinal: Positive for abdominal pain (occ reflux depends on intake overall doing better ) and constipation (1 time  "per week-decreased intake due to dental surgery ). Negative for abdominal distention, anal bleeding, blood in stool, diarrhea, nausea, rectal pain and vomiting.   Musculoskeletal: Negative for arthralgias.   Skin: Negative for pallor.   Neurological: Negative for light-headedness.       /68 (BP Location: Left arm)   Pulse 72   Ht 167.6 cm (66\")   Wt 130 kg (286 lb 9.6 oz)   BMI 46.26 kg/m²     Objective      Physical Exam  Constitutional:       General: She is not in acute distress.     Appearance: Normal appearance. She is normal weight. She is not ill-appearing or toxic-appearing.   HENT:      Head: Normocephalic and atraumatic.   Pulmonary:      Effort: Pulmonary effort is normal.   Abdominal:      General: Abdomen is flat. Bowel sounds are normal. There is no distension.      Palpations: Abdomen is soft. There is no mass.      Tenderness: There is abdominal tenderness (mild upper abdomen ).   Neurological:      Mental Status: She is alert.               The following portions of the patient's history were reviewed and updated as appropriate:   Past Medical History:   Diagnosis Date   • Anemia    • Anxiety and depression    • Asthma    • Chronic kidney disease    • Excessive or frequent menstruation    • GERD (gastroesophageal reflux disease)    • History of mammogram 2016   • Hypertension    • Migraines    • Otitis media    • Otorrhea    • Plantar fasciitis      Past Surgical History:   Procedure Laterality Date   • BARIATRIC SURGERY     •  SECTION     • CHOLECYSTECTOMY     • COLONOSCOPY N/A 2021    Procedure: COLONOSCOPY;  Surgeon: Michelle Lemus MD;  Location: [facility] ENDOSCOPY;  Service: Gastroenterology;  Laterality: N/A;   • COLONOSCOPY N/A 2022    Procedure: COLONOSCOPY;  Surgeon: Michelle Lemus MD;  Location: [facility] ENDOSCOPY;  Service: Gastroenterology;  Laterality: N/A;   • ENDOSCOPY N/A 2021    Procedure: ESOPHAGOGASTRODUODENOSCOPY;  Surgeon: Allan" MD Michelle;  Location: [facility] ENDOSCOPY;  Service: Gastroenterology;  Laterality: N/A;   • GASTRIC SLEEVE LAPAROSCOPIC     • INJECTION OF MEDICATION  09/25/2015    Kenalog (1)     • KNEE SURGERY Right    • LAPAROSCOPIC ASSISTED VAGINAL HYSTERECTOMY N/A 11/30/2021    Procedure: LAPAROSCOPIC ASSISTED VAGINAL HYSTERECTOMY with salpingectomy; cystoscopy;  Surgeon: Johnathan Younger MD;  Location: [facility] OR;  Service: Obstetrics/Gynecology;  Laterality: N/A;   • OTHER SURGICAL HISTORY  04/23/2014    Hysteroscope procedure (1)    Exam under anesthesia, diagnostic hysteroscopy with fractional dilation and curettage and NovaSure endometrial ablation.    • TUBAL ABDOMINAL LIGATION     • UPPER GASTROINTESTINAL ENDOSCOPY  09/28/2021     Family History   Problem Relation Age of Onset   • Diabetes Other    • Heart disease Other    • Hypertension Other    • Stroke Other    • Seizures Other    • Heart disease Mother    • Hypertension Mother    • Diabetes Father    • Heart disease Father    • Hypertension Father    • Osteoporosis Father    • Diabetes Maternal Grandmother    • Hypertension Maternal Grandmother    • Diabetes Paternal Grandmother    • Heart disease Paternal Grandmother    • Hypertension Paternal Grandmother    • Osteoporosis Paternal Grandmother      OB History    No obstetric history on file.       Allergies   Allergen Reactions   • Ibuprofen Anaphylaxis   • Phenergan [Promethazine] Other (See Comments)     Pt states arms and legs jump uncontrollably      Social History     Socioeconomic History   • Marital status:    Tobacco Use   • Smoking status: Never   • Smokeless tobacco: Never   Vaping Use   • Vaping Use: Never used   Substance and Sexual Activity   • Alcohol use: No   • Drug use: Never   • Sexual activity: Yes     Partners: Male     Birth control/protection: Surgical     Current Medications:  Prior to Admission medications    Medication Sig Start Date End Date Taking? Authorizing Provider   albuterol  (PROVENTIL HFA;VENTOLIN HFA) 108 (90 Base) MCG/ACT inhaler Inhale 2 puffs Every 4 (Four) Hours As Needed for Wheezing.   Yes Emergency, Nurse Luma, RN   amLODIPine (NORVASC) 10 MG tablet Take 1 tablet by mouth Daily. 4/13/21  Yes Weston Rose MD   buPROPion XL (WELLBUTRIN XL) 150 MG 24 hr tablet Take 1 tablet by mouth Every Night. 5/12/21  Yes Weston Rose MD   dexlansoprazole (Dexilant) 60 MG capsule Take 1 capsule by mouth Daily. 11/28/22  Yes Paulina Guzmán APRN   doxycycline (MONODOX) 100 MG capsule Take 1 capsule by mouth 2 (Two) Times a Day. 12/4/22  Yes Weston Rose MD   ergocalciferol (ERGOCALCIFEROL) 1.25 MG (06682 UT) capsule Take 1 capsule by mouth. 6/10/22 6/11/23 Yes Weston Rose MD   folic acid (FOLVITE) 1 MG tablet Take 1 tablet by mouth 2 (Two) Times a Day. 5/10/23  Yes Bg Segura MD   gabapentin (NEURONTIN) 300 MG capsule Take 1 capsule by mouth 4 (Four) Times a Day. 2/10/22  Yes ProviderWeston MD   hydroCHLOROthiazide (HYDRODIURIL) 12.5 MG tablet TAKE 1 TABLET(12.5 MG) BY MOUTH EVERY MORNING 3/21/22  Yes ProviderWeston MD   irbesartan (AVAPRO) 150 MG tablet Take 1 tablet by mouth Daily. 8/16/22  Yes ProviderWeston MD   tiZANidine (ZANAFLEX) 4 MG tablet Take 1 tablet by mouth Every 8 (Eight) Hours As Needed. 10/25/21  Yes ProviderWeston MD   topiramate (TOPAMAX) 100 MG tablet Take 1 tablet by mouth Daily. 11/26/22  Yes ProviderWeston MD   B-12 TR 1000 MCG tablet controlled-release Take 1 tablet by mouth Daily. 11/30/22 5/25/23 Yes Weston Rose MD   benzonatate (TESSALON) 200 MG capsule TAKE 1 CAPSULE BY MOUTH THREE TIMES DAILY AS NEEDED FOR COUGH FOR 7 DAYS 12/4/22 5/25/23  Weston Rose MD   cetirizine (zyrTEC) 10 MG tablet Take 1 tablet by mouth Daily. 12/4/22 5/25/23  Provider, MD Weston   fluticasone (FLONASE) 50 MCG/ACT nasal spray 2 sprays Daily. 12/4/22 5/25/23  Provider, MD Weston    methylPREDNISolone (MEDROL) 4 MG dose pack TAKE BY MOUTH AS DIRECTED ON INSIDE OF PACKAGE 12/4/22 5/25/23  Provider, MD Weston   polyethylene glycol (MIRALAX) 17 GM/SCOOP powder Take 17 g by mouth Daily. 11/28/22 5/25/23  Paulina Guzmán APRN   sucralfate (Carafate) 1 g tablet Take 1 tablet by mouth 4 (Four) Times a Day.  Patient not taking: Reported on 5/25/2023 5/26/22 5/25/23  Paulina Guzmán APRN     Orders placed during this encounter include:  No orders of the defined types were placed in this encounter.    * Surgery not found *  New Medications Ordered This Visit   Medications   • dexlansoprazole (Dexilant) 60 MG capsule     Sig: Take 1 capsule by mouth Daily.     Dispense:  30 capsule     Refill:  5         Review and/or summary of lab tests, radiology, procedures, medications. Review and summary of old records and obtaining of history. The risks and benefits of my recommendations, as well as other treatment options were discussed . Any questions/concerned were answered. Patient voiced understanding and agreement.          This document has been electronically signed by PORTIA Montelongo on May 25, 2023 11:20 CDT                                               Results for orders placed or performed in visit on 05/09/23   CBC Auto Differential    Specimen: Arm, Right; Blood   Result Value Ref Range    WBC 8.79 3.40 - 10.80 10*3/mm3    RBC 4.57 3.77 - 5.28 10*6/mm3    Hemoglobin 13.0 12.0 - 15.9 g/dL    Hematocrit 40.0 34.0 - 46.6 %    MCV 87.5 79.0 - 97.0 fL    MCH 28.4 26.6 - 33.0 pg    MCHC 32.5 31.5 - 35.7 g/dL    RDW 13.7 12.3 - 15.4 %    RDW-SD 43.8 37.0 - 54.0 fl    MPV 10.5 6.0 - 12.0 fL    Platelets 415 140 - 450 10*3/mm3    Neutrophil % 50.2 42.7 - 76.0 %    Lymphocyte % 35.5 19.6 - 45.3 %    Monocyte % 9.7 5.0 - 12.0 %    Eosinophil % 3.0 0.3 - 6.2 %    Basophil % 1.3 0.0 - 1.5 %    Immature Grans % 0.3 0.0 - 0.5 %    Neutrophils, Absolute 4.42 1.70 - 7.00 10*3/mm3    Lymphocytes, Absolute 3.12  (H) 0.70 - 3.10 10*3/mm3    Monocytes, Absolute 0.85 0.10 - 0.90 10*3/mm3    Eosinophils, Absolute 0.26 0.00 - 0.40 10*3/mm3    Basophils, Absolute 0.11 0.00 - 0.20 10*3/mm3    Immature Grans, Absolute 0.03 0.00 - 0.05 10*3/mm3    nRBC 0.0 0.0 - 0.2 /100 WBC   Iron and TIBC    Specimen: Blood   Result Value Ref Range    Iron 84 37 - 145 mcg/dL    Iron Saturation 24 20 - 50 %    Transferrin 233 200 - 360 mg/dL    TIBC 347 298 - 536 mcg/dL   Folate    Specimen: Blood   Result Value Ref Range    Folate 6.28 4.78 - 24.20 ng/mL   Ferritin    Specimen: Blood   Result Value Ref Range    Ferritin 458.30 (H) 13.00 - 150.00 ng/mL   Vitamin B12    Specimen: Blood   Result Value Ref Range    Vitamin B-12 432 211 - 946 pg/mL   Results for orders placed or performed in visit on 01/03/23   CBC Auto Differential    Specimen: Arm, Right; Blood   Result Value Ref Range    WBC 9.94 3.40 - 10.80 10*3/mm3    RBC 4.87 3.77 - 5.28 10*6/mm3    Hemoglobin 13.1 12.0 - 15.9 g/dL    Hematocrit 41.3 34.0 - 46.6 %    MCV 84.8 79.0 - 97.0 fL    MCH 26.9 26.6 - 33.0 pg    MCHC 31.7 31.5 - 35.7 g/dL    RDW 14.8 12.3 - 15.4 %    RDW-SD 45.2 37.0 - 54.0 fl    MPV 10.3 6.0 - 12.0 fL    Platelets 400 140 - 450 10*3/mm3    Neutrophil % 54.2 42.7 - 76.0 %    Lymphocyte % 33.8 19.6 - 45.3 %    Monocyte % 8.0 5.0 - 12.0 %    Eosinophil % 2.7 0.3 - 6.2 %    Basophil % 0.9 0.0 - 1.5 %    Immature Grans % 0.4 0.0 - 0.5 %    Neutrophils, Absolute 5.38 1.70 - 7.00 10*3/mm3    Lymphocytes, Absolute 3.36 (H) 0.70 - 3.10 10*3/mm3    Monocytes, Absolute 0.80 0.10 - 0.90 10*3/mm3    Eosinophils, Absolute 0.27 0.00 - 0.40 10*3/mm3    Basophils, Absolute 0.09 0.00 - 0.20 10*3/mm3    Immature Grans, Absolute 0.04 0.00 - 0.05 10*3/mm3    nRBC 0.0 0.0 - 0.2 /100 WBC   Iron and TIBC    Specimen: Arm, Right; Blood   Result Value Ref Range    Iron 80 37 - 145 mcg/dL    Iron Saturation 23 20 - 50 %    Transferrin 230 200 - 360 mg/dL    TIBC 343 298 - 536 mcg/dL   Folate     Specimen: Arm, Right; Blood   Result Value Ref Range    Folate 4.94 4.78 - 24.20 ng/mL   Ferritin    Specimen: Arm, Right; Blood   Result Value Ref Range    Ferritin 487.30 (H) 13.00 - 150.00 ng/mL   Vitamin B12    Specimen: Arm, Right; Blood   Result Value Ref Range    Vitamin B-12 329 211 - 946 pg/mL   Results for orders placed or performed in visit on 08/19/22   CBC Auto Differential    Specimen: Arm, Right; Blood   Result Value Ref Range    WBC 8.13 3.40 - 10.80 10*3/mm3    RBC 4.58 3.77 - 5.28 10*6/mm3    Hemoglobin 12.6 12.0 - 15.9 g/dL    Hematocrit 38.5 34.0 - 46.6 %    MCV 84.1 79.0 - 97.0 fL    MCH 27.5 26.6 - 33.0 pg    MCHC 32.7 31.5 - 35.7 g/dL    RDW 14.5 12.3 - 15.4 %    RDW-SD 44.1 37.0 - 54.0 fl    MPV 9.9 6.0 - 12.0 fL    Platelets 370 140 - 450 10*3/mm3    Neutrophil % 48.9 42.7 - 76.0 %    Lymphocyte % 39.0 19.6 - 45.3 %    Monocyte % 8.2 5.0 - 12.0 %    Eosinophil % 2.8 0.3 - 6.2 %    Basophil % 0.7 0.0 - 1.5 %    Immature Grans % 0.4 0.0 - 0.5 %    Neutrophils, Absolute 3.97 1.70 - 7.00 10*3/mm3    Lymphocytes, Absolute 3.17 (H) 0.70 - 3.10 10*3/mm3    Monocytes, Absolute 0.67 0.10 - 0.90 10*3/mm3    Eosinophils, Absolute 0.23 0.00 - 0.40 10*3/mm3    Basophils, Absolute 0.06 0.00 - 0.20 10*3/mm3    Immature Grans, Absolute 0.03 0.00 - 0.05 10*3/mm3    nRBC 0.0 0.0 - 0.2 /100 WBC   Iron and TIBC    Specimen: Arm, Right; Blood   Result Value Ref Range    Iron 80 37 - 145 mcg/dL    Iron Saturation 25 20 - 50 %    Transferrin 213 200 - 360 mg/dL    TIBC 317 298 - 536 mcg/dL   Folate    Specimen: Arm, Right; Blood   Result Value Ref Range    Folate 7.45 4.78 - 24.20 ng/mL   Ferritin    Specimen: Arm, Right; Blood   Result Value Ref Range    Ferritin 352.70 (H) 13.00 - 150.00 ng/mL   Vitamin B12    Specimen: Arm, Right; Blood   Result Value Ref Range    Vitamin B-12 359 211 - 946 pg/mL   Results for orders placed or performed in visit on 01/19/22   CBC Auto Differential    Specimen: Blood   Result  Value Ref Range    WBC 8.11 3.40 - 10.80 10*3/mm3    RBC 4.58 3.77 - 5.28 10*6/mm3    Hemoglobin 12.6 12.0 - 15.9 g/dL    Hematocrit 39.1 34.0 - 46.6 %    MCV 85.4 79.0 - 97.0 fL    MCH 27.5 26.6 - 33.0 pg    MCHC 32.2 31.5 - 35.7 g/dL    RDW 15.7 (H) 12.3 - 15.4 %    RDW-SD 48.3 37.0 - 54.0 fl    MPV 9.8 6.0 - 12.0 fL    Platelets 382 140 - 450 10*3/mm3    Neutrophil % 50.6 42.7 - 76.0 %    Lymphocyte % 36.7 19.6 - 45.3 %    Monocyte % 7.9 5.0 - 12.0 %    Eosinophil % 3.7 0.3 - 6.2 %    Basophil % 0.7 0.0 - 1.5 %    Immature Grans % 0.4 0.0 - 0.5 %    Neutrophils, Absolute 4.10 1.70 - 7.00 10*3/mm3    Lymphocytes, Absolute 2.98 0.70 - 3.10 10*3/mm3    Monocytes, Absolute 0.64 0.10 - 0.90 10*3/mm3    Eosinophils, Absolute 0.30 0.00 - 0.40 10*3/mm3    Basophils, Absolute 0.06 0.00 - 0.20 10*3/mm3    Immature Grans, Absolute 0.03 0.00 - 0.05 10*3/mm3    nRBC 0.0 0.0 - 0.2 /100 WBC     *Note: Due to a large number of results and/or encounters for the requested time period, some results have not been displayed. A complete set of results can be found in Results Review.

## 2023-08-02 ENCOUNTER — INFUSION (OUTPATIENT)
Dept: ONCOLOGY | Facility: HOSPITAL | Age: 48
End: 2023-08-02
Payer: MEDICAID

## 2023-08-02 DIAGNOSIS — E53.8 B12 DEFICIENCY: Primary | ICD-10-CM

## 2023-08-02 RX ORDER — CYANOCOBALAMIN 1000 UG/ML
1000 INJECTION, SOLUTION INTRAMUSCULAR; SUBCUTANEOUS ONCE
OUTPATIENT
Start: 2023-08-29

## 2023-08-02 RX ORDER — CYANOCOBALAMIN 1000 UG/ML
1000 INJECTION, SOLUTION INTRAMUSCULAR; SUBCUTANEOUS ONCE
Status: COMPLETED | OUTPATIENT
Start: 2023-08-02 | End: 2023-08-02

## 2023-08-02 RX ADMIN — CYANOCOBALAMIN 1000 MCG: 1000 INJECTION, SOLUTION INTRAMUSCULAR; SUBCUTANEOUS at 13:51

## 2023-09-25 ENCOUNTER — LAB (OUTPATIENT)
Dept: ONCOLOGY | Facility: HOSPITAL | Age: 48
End: 2023-09-25

## 2023-09-25 DIAGNOSIS — D50.0 IRON DEFICIENCY ANEMIA DUE TO CHRONIC BLOOD LOSS: ICD-10-CM

## 2023-09-25 DIAGNOSIS — E53.8 FOLATE DEFICIENCY: ICD-10-CM

## 2023-09-25 DIAGNOSIS — D75.839 THROMBOCYTOSIS: ICD-10-CM

## 2023-09-25 LAB
BASOPHILS # BLD AUTO: 0.08 10*3/MM3 (ref 0–0.2)
BASOPHILS NFR BLD AUTO: 0.8 % (ref 0–1.5)
DEPRECATED RDW RBC AUTO: 44.6 FL (ref 37–54)
EOSINOPHIL # BLD AUTO: 0.27 10*3/MM3 (ref 0–0.4)
EOSINOPHIL NFR BLD AUTO: 2.9 % (ref 0.3–6.2)
ERYTHROCYTE [DISTWIDTH] IN BLOOD BY AUTOMATED COUNT: 13.9 % (ref 12.3–15.4)
FERRITIN SERPL-MCNC: 487.2 NG/ML (ref 13–150)
FOLATE SERPL-MCNC: 4.33 NG/ML (ref 4.78–24.2)
HCT VFR BLD AUTO: 40.1 % (ref 34–46.6)
HGB BLD-MCNC: 12.6 G/DL (ref 12–15.9)
IMM GRANULOCYTES # BLD AUTO: 0.02 10*3/MM3 (ref 0–0.05)
IMM GRANULOCYTES NFR BLD AUTO: 0.2 % (ref 0–0.5)
IRON 24H UR-MRATE: 103 MCG/DL (ref 37–145)
IRON SATN MFR SERPL: 31 % (ref 20–50)
LYMPHOCYTES # BLD AUTO: 3.75 10*3/MM3 (ref 0.7–3.1)
LYMPHOCYTES NFR BLD AUTO: 39.7 % (ref 19.6–45.3)
MCH RBC QN AUTO: 27.4 PG (ref 26.6–33)
MCHC RBC AUTO-ENTMCNC: 31.4 G/DL (ref 31.5–35.7)
MCV RBC AUTO: 87.2 FL (ref 79–97)
MONOCYTES # BLD AUTO: 0.92 10*3/MM3 (ref 0.1–0.9)
MONOCYTES NFR BLD AUTO: 9.7 % (ref 5–12)
NEUTROPHILS NFR BLD AUTO: 4.4 10*3/MM3 (ref 1.7–7)
NEUTROPHILS NFR BLD AUTO: 46.7 % (ref 42.7–76)
NRBC BLD AUTO-RTO: 0 /100 WBC (ref 0–0.2)
PLATELET # BLD AUTO: 399 10*3/MM3 (ref 140–450)
PMV BLD AUTO: 10.1 FL (ref 6–12)
RBC # BLD AUTO: 4.6 10*6/MM3 (ref 3.77–5.28)
TIBC SERPL-MCNC: 337 MCG/DL (ref 298–536)
TRANSFERRIN SERPL-MCNC: 226 MG/DL (ref 200–360)
VIT B12 BLD-MCNC: 390 PG/ML (ref 211–946)
WBC NRBC COR # BLD: 9.44 10*3/MM3 (ref 3.4–10.8)

## 2023-09-25 PROCEDURE — 82728 ASSAY OF FERRITIN: CPT

## 2023-09-25 PROCEDURE — 85025 COMPLETE CBC W/AUTO DIFF WBC: CPT

## 2023-09-25 PROCEDURE — 36415 COLL VENOUS BLD VENIPUNCTURE: CPT | Performed by: INTERNAL MEDICINE

## 2023-09-25 PROCEDURE — 84466 ASSAY OF TRANSFERRIN: CPT

## 2023-09-25 PROCEDURE — 82607 VITAMIN B-12: CPT

## 2023-09-25 PROCEDURE — 83540 ASSAY OF IRON: CPT

## 2023-09-25 PROCEDURE — 82746 ASSAY OF FOLIC ACID SERUM: CPT

## (undated) DEVICE — GLV SURG SIGNATURE ESSENTIAL PF LTX SZ7

## (undated) DEVICE — SYR LL TP 10ML STRL

## (undated) DEVICE — SYR LL 3CC

## (undated) DEVICE — ENDOSCOPIC SEAL URO 1 SIZE FITS ALL: Brand: ENDOSCOPIC SEAL

## (undated) DEVICE — VCARE MEDIUM, UTERINE MANIPULATOR, VAGINAL-CERVICAL-AHLUWALIA'S-RETRACTOR-ELEVATOR: Brand: VCARE

## (undated) DEVICE — TBG INSUFFLATION W/PUSH ON CON: Brand: MEDLINE INDUSTRIES, INC.

## (undated) DEVICE — SUT VIC 0 CT1 36IN J946H

## (undated) DEVICE — LAPAROSCOPIC SMOKE FILTRATION SYSTEM: Brand: PALL LAPAROSHIELD® PLUS LAPAROSCOPIC SMOKE FILTRATION SYSTEM

## (undated) DEVICE — GLV SURG SENSICARE PI ORTHO SZ6.5 LF STRL

## (undated) DEVICE — ADHS SKIN PREMIERPRO EXOFIN TOPICAL HI/VISC .5ML

## (undated) DEVICE — STERILE POLYISOPRENE POWDER-FREE SURGICAL GLOVES WITH EMOLLIENT COATING: Brand: PROTEXIS

## (undated) DEVICE — DRAPE UNDERBUTTOCKS W FLUID POUCH 40X44IN

## (undated) DEVICE — SOL IRR NACL 0.9PCT BT 1000ML

## (undated) DEVICE — SINGLE-USE BIOPSY FORCEPS: Brand: RADIAL JAW 4

## (undated) DEVICE — SUT MERSILENE 5MM ETHD9212

## (undated) DEVICE — GLV SURG SENSICARE PI PF LF 7 GRN STRL

## (undated) DEVICE — SUTURING DEVICE: Brand: ENDO STITCH

## (undated) DEVICE — PK MAJ PROC LF 60

## (undated) DEVICE — CORE TRUMPET FOR SINGLE SOLUTION BAG: Brand: CORE DYNAMICS

## (undated) DEVICE — METER,URINE,400ML,DRAIN BAG,L/F,LL,SLIDE: Brand: MEDLINE

## (undated) DEVICE — PATIENT RETURN ELECTRODE, SINGLE-USE, CONTACT QUALITY MONITORING, ADULT, WITH 9FT CORD, FOR PATIENTS WEIGING OVER 33LBS. (15KG): Brand: MEGADYNE

## (undated) DEVICE — APPL HEMO ENDO SURGICEL 2IN1 1P/U STRL

## (undated) DEVICE — NDL FLTR 19G 1 1/2 LF

## (undated) DEVICE — ELECTRD E/S MEGADYNE EZCLEAN L/WR LAP 5MM 33CM 1P/U

## (undated) DEVICE — ENDOPATH XCEL BLADELESS TROCARS WITH STABILITY SLEEVES: Brand: ENDOPATH XCEL

## (undated) DEVICE — SYS CLS PORTSITE CT CLOSESURE 5AND10/12

## (undated) DEVICE — PENCL ES MEGADINE EZ/CLEAN BUTN W/HOLSTR 10FT

## (undated) DEVICE — CYSTO/BLADDER IRRIGATION SET, REGULATING CLAMP

## (undated) DEVICE — BITEBLOCK ENDO W/STRAP 60F A/ LF DISP

## (undated) DEVICE — ENSEAL X1 TISSUE SEALER, CURVED JAW, 37 CM SHAFT LENGTH: Brand: ENSEAL

## (undated) DEVICE — SOL IRRIG NACL 1000ML

## (undated) DEVICE — SUT MNCRYL 3/0 Y936H

## (undated) DEVICE — ENDOPATH XCEL WITH OPTIVIEW TECHNOLOGY BLADELESS TROCARS WITH STABILITY SLEEVES: Brand: ENDOPATH XCEL OPTIVIEW

## (undated) DEVICE — CATH FOL LUBRISIL 3WY 18F 5CC

## (undated) DEVICE — GLV SURG SENSICARE POLYISPRN W/ALOE PF LF 6.5 GRN STRL

## (undated) DEVICE — GLV SURG DERMASSURE GRN LF PF SZ 6.5

## (undated) DEVICE — SYR LUERLOK 50ML

## (undated) DEVICE — PROXIMATE PLUS MD MULTI-DIRECTIONAL RELEASE SKIN STAPLERS CONTAINS 35 STAINLESS STEEL STAPLES APPROXIMATE CLOSED DIMENSIONS: 6.9MM X 3.9MM WIDE: Brand: PROXIMATE

## (undated) DEVICE — GLV SURG SIGNATURE ESSENTIAL PF LTX SZ7.5

## (undated) DEVICE — GOWN,PREVENTION PLUS,XLNG/XXLARGE,STRL: Brand: MEDLINE

## (undated) DEVICE — VISUALIZATION SYSTEM: Brand: CLEARIFY

## (undated) DEVICE — GOWN,AURORA,NOREINF,RAGLAN,XL,STERILE: Brand: MEDLINE

## (undated) DEVICE — ENDOPATH PNEUMONEEDLE INSUFFLATION NEEDLES WITH LUER LOCK CONNECTORS 150MM: Brand: ENDOPATH

## (undated) DEVICE — DEV UTERINE EPLORA1 SHRP W/VACULOK SYR 3MM

## (undated) DEVICE — MONOPOLAR METZENBAUM SCISSOR TIP, DISPOSABLE: Brand: MONOPOLAR METZENBAUM SCISSOR TIP, DISPOSABLE

## (undated) DEVICE — Device

## (undated) DEVICE — NDL HYPO ECLPS SFTY 25G 1 1/2IN

## (undated) DEVICE — TOTAL TRAY, 16FR 10ML SIL FOLEY, URN: Brand: MEDLINE

## (undated) DEVICE — PK LAP GYN 60